# Patient Record
Sex: MALE | Race: WHITE | NOT HISPANIC OR LATINO | Employment: UNEMPLOYED | URBAN - METROPOLITAN AREA
[De-identification: names, ages, dates, MRNs, and addresses within clinical notes are randomized per-mention and may not be internally consistent; named-entity substitution may affect disease eponyms.]

---

## 2018-06-07 LAB
ABSOL LYMPHOCYTES (HISTORICAL): 1.4 K/UL (ref 0.5–4)
ALBUMIN SERPL BCP-MCNC: 3.8 G/DL (ref 3–5.2)
ALT SERPL W P-5'-P-CCNC: 25 U/L (ref 9–52)
AST SERPL W P-5'-P-CCNC: 12 U/L (ref 17–59)
BASOPHILS # BLD AUTO: 0 K/UL (ref 0–0.1)
BASOPHILS # BLD AUTO: 1 % (ref 0–1)
CREATINE, SERUM (HISTORICAL): 0.59 MG/DL (ref 0.7–1.5)
DEPRECATED RDW RBC AUTO: 12.8 %
EGFR (HISTORICAL): >60 ML/MIN/1.73 M2
EOSINOPHIL # BLD AUTO: 0.1 K/UL (ref 0–0.4)
EOSINOPHIL NFR BLD AUTO: 1 % (ref 0–6)
ERYTHROCYTE SEDIMENTATION RATE (HISTORICAL): 71 MM (ref 1–20)
HCT VFR BLD AUTO: 40.4 % (ref 41–53)
HGB BLD-MCNC: 13.5 G/DL (ref 13.5–17.5)
LYMPHOCYTES NFR BLD AUTO: 20 % (ref 25–45)
MCH RBC QN AUTO: 31.9 PG (ref 26–34)
MCHC RBC AUTO-ENTMCNC: 33.5 % (ref 31–36)
MCV RBC AUTO: 95 FL (ref 80–100)
MONOCYTES # BLD AUTO: 0.5 K/UL (ref 0.2–0.9)
MONOCYTES NFR BLD AUTO: 8 % (ref 1–10)
NEUTROPHILS ABS COUNT (HISTORICAL): 4.8 K/UL (ref 1.8–7.8)
NEUTS SEG NFR BLD AUTO: 70 % (ref 45–65)
PLATELET # BLD AUTO: 264 K/MCL (ref 150–450)
RBC # BLD AUTO: 4.25 M/MCL (ref 4.5–5.9)
WBC # BLD AUTO: 6.8 K/MCL (ref 4.5–11)

## 2018-06-29 ENCOUNTER — TRANSCRIBE ORDERS (OUTPATIENT)
Dept: ADMINISTRATIVE | Facility: HOSPITAL | Age: 43
End: 2018-06-29

## 2018-06-29 ENCOUNTER — APPOINTMENT (OUTPATIENT)
Dept: LAB | Facility: HOSPITAL | Age: 43
End: 2018-06-29
Payer: COMMERCIAL

## 2018-06-29 DIAGNOSIS — Z51.81 ENCOUNTER FOR THERAPEUTIC DRUG MONITORING: ICD-10-CM

## 2018-06-29 DIAGNOSIS — E11.65 UNCONTROLLED TYPE 2 DIABETES MELLITUS WITH COMPLICATION, UNSPECIFIED WHETHER LONG TERM INSULIN USE: ICD-10-CM

## 2018-06-29 DIAGNOSIS — Z51.81 ENCOUNTER FOR THERAPEUTIC DRUG MONITORING: Primary | ICD-10-CM

## 2018-06-29 DIAGNOSIS — E11.8 UNCONTROLLED TYPE 2 DIABETES MELLITUS WITH COMPLICATION, UNSPECIFIED WHETHER LONG TERM INSULIN USE: ICD-10-CM

## 2018-06-29 DIAGNOSIS — E61.1 IRON DEFICIENCY: ICD-10-CM

## 2018-06-29 DIAGNOSIS — E11.8 UNCONTROLLED TYPE 2 DIABETES MELLITUS WITH COMPLICATION, UNSPECIFIED WHETHER LONG TERM INSULIN USE: Primary | ICD-10-CM

## 2018-06-29 DIAGNOSIS — E11.65 UNCONTROLLED TYPE 2 DIABETES MELLITUS WITH COMPLICATION, UNSPECIFIED WHETHER LONG TERM INSULIN USE: Primary | ICD-10-CM

## 2018-06-29 LAB
ALBUMIN SERPL BCP-MCNC: 3.6 G/DL (ref 3–5.2)
ALP SERPL-CCNC: 88 U/L (ref 43–122)
ALT SERPL W P-5'-P-CCNC: 47 U/L (ref 9–52)
ANION GAP SERPL CALCULATED.3IONS-SCNC: 10 MMOL/L (ref 5–14)
AST SERPL W P-5'-P-CCNC: 15 U/L (ref 17–59)
BASOPHILS # BLD AUTO: 0 THOUSANDS/ΜL (ref 0–0.1)
BASOPHILS NFR BLD AUTO: 0 % (ref 0–1)
BILIRUB SERPL-MCNC: 0.5 MG/DL
BUN SERPL-MCNC: 22 MG/DL (ref 5–25)
CALCIUM SERPL-MCNC: 9.3 MG/DL (ref 8.4–10.2)
CHLORIDE SERPL-SCNC: 101 MMOL/L (ref 97–108)
CHOLEST SERPL-MCNC: 165 MG/DL
CO2 SERPL-SCNC: 26 MMOL/L (ref 22–30)
CREAT SERPL-MCNC: 0.49 MG/DL (ref 0.7–1.5)
CREAT UR-MCNC: 155 MG/DL
EOSINOPHIL # BLD AUTO: 0.1 THOUSAND/ΜL (ref 0–0.4)
EOSINOPHIL NFR BLD AUTO: 2 % (ref 0–6)
ERYTHROCYTE [DISTWIDTH] IN BLOOD BY AUTOMATED COUNT: 13.5 %
EST. AVERAGE GLUCOSE BLD GHB EST-MCNC: 194 MG/DL
GFR SERPL CREATININE-BSD FRML MDRD: 137 ML/MIN/1.73SQ M
GLUCOSE P FAST SERPL-MCNC: 169 MG/DL (ref 70–99)
HBA1C MFR BLD: 8.4 % (ref 4.2–6.3)
HCT VFR BLD AUTO: 40.7 % (ref 41–53)
HDLC SERPL-MCNC: 42 MG/DL (ref 40–59)
HGB BLD-MCNC: 13.4 G/DL (ref 13.5–17.5)
LDLC SERPL CALC-MCNC: 93 MG/DL
LYMPHOCYTES # BLD AUTO: 1 THOUSANDS/ΜL (ref 0.5–4)
LYMPHOCYTES NFR BLD AUTO: 12 % (ref 20–50)
MCH RBC QN AUTO: 31.5 PG (ref 26–34)
MCHC RBC AUTO-ENTMCNC: 33 G/DL (ref 31–36)
MCV RBC AUTO: 96 FL (ref 80–100)
MICROALBUMIN UR-MCNC: 27.1 MG/L (ref 0–20)
MICROALBUMIN/CREAT 24H UR: 17 MG/G CREATININE (ref 0–30)
MONOCYTES # BLD AUTO: 0.6 THOUSAND/ΜL (ref 0.2–0.9)
MONOCYTES NFR BLD AUTO: 7 % (ref 1–10)
NEUTROPHILS # BLD AUTO: 7 THOUSANDS/ΜL (ref 1.8–7.8)
NEUTS SEG NFR BLD AUTO: 79 % (ref 45–65)
NONHDLC SERPL-MCNC: 123 MG/DL
PLATELET # BLD AUTO: 283 THOUSANDS/UL (ref 150–450)
PMV BLD AUTO: 9.1 FL (ref 8.9–12.7)
POTASSIUM SERPL-SCNC: 4 MMOL/L (ref 3.6–5)
PROT SERPL-MCNC: 6.7 G/DL (ref 5.9–8.4)
RBC # BLD AUTO: 4.25 MILLION/UL (ref 4.5–5.9)
SODIUM SERPL-SCNC: 137 MMOL/L (ref 137–147)
T3FREE SERPL-MCNC: 2.86 PG/ML (ref 2.3–4.2)
T4 SERPL-MCNC: 8.6 UG/DL (ref 4.7–13.3)
TRIGL SERPL-MCNC: 148 MG/DL
TSH SERPL DL<=0.05 MIU/L-ACNC: 5.32 UIU/ML (ref 0.47–4.68)
WBC # BLD AUTO: 8.8 THOUSAND/UL (ref 4.5–11)

## 2018-06-29 PROCEDURE — 84443 ASSAY THYROID STIM HORMONE: CPT

## 2018-06-29 PROCEDURE — 80307 DRUG TEST PRSMV CHEM ANLYZR: CPT

## 2018-06-29 PROCEDURE — 82043 UR ALBUMIN QUANTITATIVE: CPT

## 2018-06-29 PROCEDURE — 80061 LIPID PANEL: CPT

## 2018-06-29 PROCEDURE — 36415 COLL VENOUS BLD VENIPUNCTURE: CPT

## 2018-06-29 PROCEDURE — 80053 COMPREHEN METABOLIC PANEL: CPT

## 2018-06-29 PROCEDURE — 84436 ASSAY OF TOTAL THYROXINE: CPT

## 2018-06-29 PROCEDURE — 82570 ASSAY OF URINE CREATININE: CPT

## 2018-06-29 PROCEDURE — 85025 COMPLETE CBC W/AUTO DIFF WBC: CPT

## 2018-06-29 PROCEDURE — 3061F NEG MICROALBUMINURIA REV: CPT | Performed by: FAMILY MEDICINE

## 2018-06-29 PROCEDURE — 84481 FREE ASSAY (FT-3): CPT

## 2018-06-29 PROCEDURE — 83036 HEMOGLOBIN GLYCOSYLATED A1C: CPT

## 2018-07-05 LAB
AMPHETAMINES UR QL SCN: NEGATIVE NG/ML
BARBITURATES UR QL SCN: NEGATIVE NG/ML
BENZODIAZ UR QL SCN: POSITIVE
BZE UR QL: NEGATIVE NG/ML
CANNABINOIDS UR QL SCN: ABNORMAL
METHADONE UR QL SCN: NEGATIVE NG/ML
OPIATES UR QL: NEGATIVE NG/ML
PCP UR QL: NEGATIVE NG/ML
PROPOXYPH UR QL: NEGATIVE NG/ML

## 2018-07-17 ENCOUNTER — TELEPHONE (OUTPATIENT)
Dept: FAMILY MEDICINE CLINIC | Facility: CLINIC | Age: 43
End: 2018-07-17

## 2018-07-17 DIAGNOSIS — E11.9 TYPE 2 DIABETES MELLITUS WITHOUT COMPLICATION, WITHOUT LONG-TERM CURRENT USE OF INSULIN (HCC): Primary | ICD-10-CM

## 2018-07-17 NOTE — TELEPHONE ENCOUNTER
Spoke with patient  TSH slightly elevated, FT4 normal   Would repeat in 3 mos  Hgb A1C 8 6  Needs third med  Will leave decision to new doctor  Has appt  In August   Januvia prescribed

## 2018-08-28 ENCOUNTER — APPOINTMENT (OUTPATIENT)
Dept: LAB | Facility: HOSPITAL | Age: 43
End: 2018-08-28
Payer: COMMERCIAL

## 2018-08-28 ENCOUNTER — TRANSCRIBE ORDERS (OUTPATIENT)
Dept: ADMINISTRATIVE | Facility: HOSPITAL | Age: 43
End: 2018-08-28

## 2018-08-28 DIAGNOSIS — M05.79 RHEUMATOID ARTHRITIS INVOLVING MULTIPLE SITES WITH POSITIVE RHEUMATOID FACTOR (HCC): ICD-10-CM

## 2018-08-28 DIAGNOSIS — M05.79 RHEUMATOID ARTHRITIS INVOLVING MULTIPLE SITES WITH POSITIVE RHEUMATOID FACTOR (HCC): Primary | ICD-10-CM

## 2018-08-28 LAB
ALBUMIN SERPL BCP-MCNC: 4.1 G/DL (ref 3–5.2)
ALT SERPL W P-5'-P-CCNC: 33 U/L (ref 9–52)
AST SERPL W P-5'-P-CCNC: 15 U/L (ref 17–59)
BASOPHILS # BLD AUTO: 0.1 THOUSANDS/ΜL (ref 0–0.1)
BASOPHILS NFR BLD AUTO: 1 % (ref 0–1)
CREAT SERPL-MCNC: 0.65 MG/DL (ref 0.7–1.5)
EOSINOPHIL # BLD AUTO: 0.1 THOUSAND/ΜL (ref 0–0.4)
EOSINOPHIL NFR BLD AUTO: 1 % (ref 0–6)
ERYTHROCYTE [DISTWIDTH] IN BLOOD BY AUTOMATED COUNT: 13.8 %
ERYTHROCYTE [SEDIMENTATION RATE] IN BLOOD: 61 MM/HOUR (ref 1–20)
GFR SERPL CREATININE-BSD FRML MDRD: 122 ML/MIN/1.73SQ M
HCT VFR BLD AUTO: 41.7 % (ref 41–53)
HGB BLD-MCNC: 13.7 G/DL (ref 13.5–17.5)
LYMPHOCYTES # BLD AUTO: 1.9 THOUSANDS/ΜL (ref 0.5–4)
LYMPHOCYTES NFR BLD AUTO: 21 % (ref 20–50)
MCH RBC QN AUTO: 31.7 PG (ref 26–34)
MCHC RBC AUTO-ENTMCNC: 32.8 G/DL (ref 31–36)
MCV RBC AUTO: 97 FL (ref 80–100)
MONOCYTES # BLD AUTO: 0.8 THOUSAND/ΜL (ref 0.2–0.9)
MONOCYTES NFR BLD AUTO: 9 % (ref 1–10)
NEUTROPHILS # BLD AUTO: 6.5 THOUSANDS/ΜL (ref 1.8–7.8)
NEUTS SEG NFR BLD AUTO: 69 % (ref 45–65)
PLATELET # BLD AUTO: 309 THOUSANDS/UL (ref 150–450)
PMV BLD AUTO: 9.5 FL (ref 8.9–12.7)
RBC # BLD AUTO: 4.31 MILLION/UL (ref 4.5–5.9)
WBC # BLD AUTO: 9.4 THOUSAND/UL (ref 4.5–11)

## 2018-08-28 PROCEDURE — 85652 RBC SED RATE AUTOMATED: CPT

## 2018-08-28 PROCEDURE — 84450 TRANSFERASE (AST) (SGOT): CPT

## 2018-08-28 PROCEDURE — 82565 ASSAY OF CREATININE: CPT

## 2018-08-28 PROCEDURE — 84460 ALANINE AMINO (ALT) (SGPT): CPT

## 2018-08-28 PROCEDURE — 36415 COLL VENOUS BLD VENIPUNCTURE: CPT

## 2018-08-28 PROCEDURE — 82040 ASSAY OF SERUM ALBUMIN: CPT

## 2018-08-28 PROCEDURE — 85025 COMPLETE CBC W/AUTO DIFF WBC: CPT

## 2018-09-11 ENCOUNTER — APPOINTMENT (OUTPATIENT)
Dept: LAB | Facility: HOSPITAL | Age: 43
End: 2018-09-11
Payer: COMMERCIAL

## 2018-09-11 ENCOUNTER — OFFICE VISIT (OUTPATIENT)
Dept: FAMILY MEDICINE CLINIC | Facility: CLINIC | Age: 43
End: 2018-09-11
Payer: COMMERCIAL

## 2018-09-11 VITALS
DIASTOLIC BLOOD PRESSURE: 84 MMHG | BODY MASS INDEX: 24.24 KG/M2 | HEIGHT: 72 IN | OXYGEN SATURATION: 97 % | HEART RATE: 101 BPM | WEIGHT: 179 LBS | SYSTOLIC BLOOD PRESSURE: 138 MMHG | TEMPERATURE: 96.9 F | RESPIRATION RATE: 16 BRPM

## 2018-09-11 DIAGNOSIS — E11.8 TYPE 2 DIABETES MELLITUS WITH COMPLICATION, WITHOUT LONG-TERM CURRENT USE OF INSULIN (HCC): ICD-10-CM

## 2018-09-11 DIAGNOSIS — E11.9 TYPE 2 DIABETES MELLITUS WITHOUT COMPLICATION, WITHOUT LONG-TERM CURRENT USE OF INSULIN (HCC): ICD-10-CM

## 2018-09-11 DIAGNOSIS — M06.9 RHEUMATOID ARTHRITIS, INVOLVING UNSPECIFIED SITE, UNSPECIFIED RHEUMATOID FACTOR PRESENCE: Primary | ICD-10-CM

## 2018-09-11 LAB
EST. AVERAGE GLUCOSE BLD GHB EST-MCNC: 174 MG/DL
HBA1C MFR BLD: 7.7 % (ref 4.2–6.3)

## 2018-09-11 PROCEDURE — 36415 COLL VENOUS BLD VENIPUNCTURE: CPT

## 2018-09-11 PROCEDURE — 83036 HEMOGLOBIN GLYCOSYLATED A1C: CPT

## 2018-09-11 PROCEDURE — 99214 OFFICE O/P EST MOD 30 MIN: CPT | Performed by: FAMILY MEDICINE

## 2018-09-11 PROCEDURE — 3008F BODY MASS INDEX DOCD: CPT | Performed by: FAMILY MEDICINE

## 2018-09-11 PROCEDURE — 3725F SCREEN DEPRESSION PERFORMED: CPT | Performed by: FAMILY MEDICINE

## 2018-09-11 PROCEDURE — 1036F TOBACCO NON-USER: CPT | Performed by: FAMILY MEDICINE

## 2018-09-11 RX ORDER — MELOXICAM 15 MG/1
15 TABLET ORAL DAILY
Qty: 30 TABLET | Refills: 2 | Status: SHIPPED | OUTPATIENT
Start: 2018-09-11 | End: 2019-02-25 | Stop reason: SDUPTHER

## 2018-09-11 RX ORDER — SENNOSIDES 8.6 MG
650 CAPSULE ORAL EVERY 8 HOURS PRN
Qty: 30 TABLET | Refills: 3 | Status: SHIPPED | OUTPATIENT
Start: 2018-09-11 | End: 2021-10-21

## 2018-09-11 RX ORDER — MELOXICAM 15 MG/1
TABLET ORAL
Refills: 0 | COMMUNITY
Start: 2018-09-01 | End: 2018-09-11 | Stop reason: SDUPTHER

## 2018-09-11 RX ORDER — ALPRAZOLAM 2 MG/1
TABLET ORAL
Refills: 0 | COMMUNITY
Start: 2018-09-09 | End: 2019-08-01 | Stop reason: SDUPTHER

## 2018-09-11 RX ORDER — LEFLUNOMIDE 20 MG/1
20 TABLET ORAL DAILY
Refills: 0 | COMMUNITY
Start: 2018-08-27

## 2018-09-11 NOTE — ASSESSMENT & PLAN NOTE
Recently diagnosed  Currently under the care of Rheumatology  Will continue to monitor  Meloxicam refilled for patient comfort

## 2018-09-11 NOTE — PROGRESS NOTES
Assessment/Plan:    Type 2 diabetes mellitus (HCC)  Lab Results   Component Value Date    HGBA1C 8 4 (H) 06/29/2018       No results for input(s): POCGLU in the last 72 hours  Blood Sugar Average: Last 72 hrs: Advised to take Metformin BID as directed  A1c ordered  Continue to strive for DM diet   Check feet daily for any breaks in the skin and follow up with PCP for any positive findings   Follow yearly with eye doctor for dilated eye exam   Januvia and Metformin refilled        Rheumatoid arthritis ([address] )  Recently diagnosed  Currently under the care of Rheumatology  Will continue to monitor  Meloxicam refilled for patient comfort  Diagnoses and all orders for this visit:    Rheumatoid arthritis, involving unspecified site, unspecified rheumatoid factor presence (HCC)  -     meloxicam (MOBIC) 15 mg tablet; Take 1 tablet (15 mg total) by mouth daily    Type 2 diabetes mellitus with complication, without long-term current use of insulin (HCC)  -     metFORMIN (GLUCOPHAGE) 1000 MG tablet; Take 1 tablet (1,000 mg total) by mouth 2 (two) times a day with meals  -     acetaminophen (TYLENOL) 650 mg CR tablet; Take 1 tablet (650 mg total) by mouth every 8 (eight) hours as needed for mild pain    Type 2 diabetes mellitus without complication, without long-term current use of insulin ([facility])  -     sitaGLIPtin (JANUVIA) 100 mg tablet; Take 1 tablet (100 mg total) by mouth daily  -     HEMOGLOBIN A1C W/ EAG ESTIMATION; Future    Other orders  -     Adalimumab 40 MG/0 8ML PNKT; Inject 40 mg under the skin  -     leflunomide (ARAVA) 20 MG tablet; Take 20 mg by mouth daily  -     ALPRAZolam (XANAX) 2 MG tablet;   -     Discontinue: meloxicam (MOBIC) 15 mg tablet;   -     Discontinue: metFORMIN (GLUCOPHAGE) 1000 MG tablet; 2 (two) times a day with meals  1 tab daily   -     sitaGLIPtin (JANUVIA) 100 mg tablet; every 24 hours        Subjective:      Patient ID: Stephanie Cochran is a 38 y o  male      HPI  38year old male presents to clinic with a [facility] Rheumatoid Arthirits, Diabetes and  Depression, presents to clinic for medication refills  Follows up with Rheumatologist frequently as he begun taking Humira, is scheduled to follow up in 3 weeks  Currently concerned about his A1c level which was 8 4 in June  Upon medication reconciliation it was noted he has only been taking metformin daily opposed to BID, was counseled on the importance of taking medications as prescribed  Has no other complaints at this time  The following portions of the patient's history were reviewed and updated as appropriate: allergies, current medications, past family history, past medical history, past social history, past surgical history and problem list     Review of Systems   Constitutional: Negative for activity change, chills and fever  HENT: Negative for congestion, facial swelling and sneezing  Eyes: Negative for pain  Respiratory: Negative for choking and chest tightness  Cardiovascular: Negative for chest pain and leg swelling  Gastrointestinal: Negative for abdominal distention and abdominal pain  Genitourinary: Negative for dysuria and hematuria  Musculoskeletal: Positive for arthralgias and joint swelling  Negative for back pain  Neurological: Negative for seizures and headaches  Psychiatric/Behavioral: Negative for agitation, hallucinations and self-injury  Objective:      /84 (BP Location: Right arm, Patient Position: Sitting, Cuff Size: Standard)   Pulse 101   Temp (!) 96 9 °F (36 1 °C) (Temporal)   Resp 16   Ht 6' (1 829 m)   Wt 81 2 kg (179 lb)   SpO2 97%   BMI 24 28 kg/m²          Physical Exam   Constitutional: He is oriented to person, place, and time  He appears well-developed and well-nourished  No distress  HENT:   Head: Atraumatic     Right Ear: External ear normal    Left Ear: External ear normal    Nose: Nose normal    Mouth/Throat: Oropharynx is clear and moist  No oropharyngeal exudate  Eyes: Pupils are equal, round, and reactive to light  No scleral icterus  Neck: Neck supple  No tracheal deviation present  No thyromegaly present  Cardiovascular: Normal rate, regular rhythm and intact distal pulses  No murmur heard  Pulmonary/Chest: Effort normal and breath sounds normal  No respiratory distress  He has no wheezes  Abdominal: Soft  Bowel sounds are normal  He exhibits no distension  There is no tenderness  Musculoskeletal: Normal range of motion  He exhibits no edema  Neurological: He is alert and oriented to person, place, and time  Skin: Skin is warm  He is not diaphoretic  Psychiatric: He has a normal mood and affect   His behavior is normal        A1C in the past 12 month Yes  Urine microalbumin in the past 12 month No  If HTN or urine microalbumin >30, ACEi or ARB No  Lipid profile in the past 12 month YES  On Statin No,   Lab Results   Component Value Date    CHOLESTEROL 165 06/29/2018    HDL 42 06/29/2018    TRIG 148 06/29/2018     ASA for >48years old with RF No  If BMI >25, documented nutrition, exercise, wt reduction N/A  PHQ-2 Yes  BP controlled (<140/90) Yes  Dilated eye exam in the past 12 month No  Foot exam in the past 12 month No  Flu vaccine in the past 12 month Yes  PCV No  Smoker No  Counseled for tobacco cessation No

## 2018-09-11 NOTE — ASSESSMENT & PLAN NOTE
Lab Results   Component Value Date    HGBA1C 8 4 (H) 06/29/2018       No results for input(s): POCGLU in the last 72 hours  Blood Sugar Average: Last 72 hrs: Advised to take Metformin BID as directed    A1c ordered  Continue to strive for DM diet   Check feet daily for any breaks in the skin and follow up with PCP for any positive findings   Follow yearly with eye doctor for dilated eye exam   Januvia and Metformin refilled

## 2018-11-21 ENCOUNTER — HOSPITAL ENCOUNTER (OUTPATIENT)
Dept: RADIOLOGY | Facility: HOSPITAL | Age: 43
Discharge: HOME | End: 2018-11-21
Payer: COMMERCIAL

## 2018-11-21 ENCOUNTER — APPOINTMENT (OUTPATIENT)
Dept: LAB | Facility: HOSPITAL | Age: 43
End: 2018-11-21
Payer: COMMERCIAL

## 2018-11-21 ENCOUNTER — OFFICE VISIT (OUTPATIENT)
Dept: FAMILY MEDICINE CLINIC | Facility: CLINIC | Age: 43
End: 2018-11-21
Payer: COMMERCIAL

## 2018-11-21 ENCOUNTER — TRANSCRIBE ORDERS (OUTPATIENT)
Dept: ADMINISTRATIVE | Facility: HOSPITAL | Age: 43
End: 2018-11-21

## 2018-11-21 VITALS
DIASTOLIC BLOOD PRESSURE: 90 MMHG | SYSTOLIC BLOOD PRESSURE: 140 MMHG | HEIGHT: 72 IN | BODY MASS INDEX: 25.33 KG/M2 | RESPIRATION RATE: 16 BRPM | HEART RATE: 115 BPM | OXYGEN SATURATION: 95 % | TEMPERATURE: 96.7 F | WEIGHT: 187 LBS

## 2018-11-21 DIAGNOSIS — M05.79 RHEUMATOID ARTHRITIS INVOLVING MULTIPLE SITES WITH POSITIVE RHEUMATOID FACTOR (HCC): Primary | ICD-10-CM

## 2018-11-21 DIAGNOSIS — M05.79 RHEUMATOID ARTHRITIS INVOLVING MULTIPLE SITES WITH POSITIVE RHEUMATOID FACTOR (HCC): ICD-10-CM

## 2018-11-21 DIAGNOSIS — M79.671 FOOT PAIN, BILATERAL: Primary | ICD-10-CM

## 2018-11-21 DIAGNOSIS — M79.672 FOOT PAIN, BILATERAL: Primary | ICD-10-CM

## 2018-11-21 LAB
ALBUMIN SERPL BCP-MCNC: 4.1 G/DL (ref 3–5.2)
ALT SERPL W P-5'-P-CCNC: 37 U/L (ref 9–52)
AST SERPL W P-5'-P-CCNC: 21 U/L (ref 17–59)
BASOPHILS # BLD AUTO: 0.1 THOUSANDS/ΜL (ref 0–0.1)
BASOPHILS NFR BLD AUTO: 1 % (ref 0–1)
CREAT SERPL-MCNC: 0.63 MG/DL (ref 0.7–1.5)
EOSINOPHIL # BLD AUTO: 0.1 THOUSAND/ΜL (ref 0–0.4)
EOSINOPHIL NFR BLD AUTO: 2 % (ref 0–6)
ERYTHROCYTE [DISTWIDTH] IN BLOOD BY AUTOMATED COUNT: 13.8 %
ERYTHROCYTE [SEDIMENTATION RATE] IN BLOOD: 28 MM/HOUR (ref 1–20)
GFR SERPL CREATININE-BSD FRML MDRD: 122 ML/MIN/1.73SQ M
HCT VFR BLD AUTO: 39.1 % (ref 41–53)
HGB BLD-MCNC: 12.7 G/DL (ref 13.5–17.5)
LYMPHOCYTES # BLD AUTO: 1.7 THOUSANDS/ΜL (ref 0.5–4)
LYMPHOCYTES NFR BLD AUTO: 25 % (ref 20–50)
MCH RBC QN AUTO: 32.7 PG (ref 26–34)
MCHC RBC AUTO-ENTMCNC: 32.6 G/DL (ref 31–36)
MCV RBC AUTO: 100 FL (ref 80–100)
MONOCYTES # BLD AUTO: 0.7 THOUSAND/ΜL (ref 0.2–0.9)
MONOCYTES NFR BLD AUTO: 11 % (ref 1–10)
NEUTROPHILS # BLD AUTO: 4 THOUSANDS/ΜL (ref 1.8–7.8)
NEUTS SEG NFR BLD AUTO: 61 % (ref 45–65)
PLATELET # BLD AUTO: 264 THOUSANDS/UL (ref 150–450)
PLATELET BLD QL SMEAR: ADEQUATE
PMV BLD AUTO: 9.9 FL (ref 8.9–12.7)
RBC # BLD AUTO: 3.9 MILLION/UL (ref 4.5–5.9)
RBC MORPH BLD: NORMAL
WBC # BLD AUTO: 6.6 THOUSAND/UL (ref 4.5–11)

## 2018-11-21 PROCEDURE — 84450 TRANSFERASE (AST) (SGOT): CPT

## 2018-11-21 PROCEDURE — 73130 X-RAY EXAM OF HAND: CPT

## 2018-11-21 PROCEDURE — 99214 OFFICE O/P EST MOD 30 MIN: CPT | Performed by: FAMILY MEDICINE

## 2018-11-21 PROCEDURE — 84460 ALANINE AMINO (ALT) (SGPT): CPT

## 2018-11-21 PROCEDURE — 85652 RBC SED RATE AUTOMATED: CPT

## 2018-11-21 PROCEDURE — 36415 COLL VENOUS BLD VENIPUNCTURE: CPT

## 2018-11-21 PROCEDURE — 3008F BODY MASS INDEX DOCD: CPT | Performed by: FAMILY MEDICINE

## 2018-11-21 PROCEDURE — 85025 COMPLETE CBC W/AUTO DIFF WBC: CPT

## 2018-11-21 PROCEDURE — 82565 ASSAY OF CREATININE: CPT

## 2018-11-21 PROCEDURE — 82040 ASSAY OF SERUM ALBUMIN: CPT

## 2018-11-21 RX ORDER — LEFLUNOMIDE 20 MG/1
20 TABLET ORAL
COMMUNITY
Start: 2018-10-18 | End: 2019-08-01 | Stop reason: SDUPTHER

## 2018-11-21 RX ORDER — BLOOD SUGAR DIAGNOSTIC
STRIP MISCELLANEOUS
COMMUNITY
Start: 2016-10-20 | End: 2021-09-08 | Stop reason: CLARIF

## 2018-11-21 RX ORDER — MELOXICAM 15 MG/1
TABLET ORAL
COMMUNITY
Start: 2018-06-07 | End: 2019-08-01 | Stop reason: SDUPTHER

## 2018-11-21 RX ORDER — LANCETS
EACH MISCELLANEOUS
COMMUNITY
Start: 2017-08-16 | End: 2021-09-08 | Stop reason: SDDI

## 2018-11-21 RX ORDER — ALPRAZOLAM 2 MG/1
TABLET ORAL 3 TIMES DAILY
COMMUNITY
End: 2022-05-04

## 2018-11-21 RX ORDER — QUETIAPINE FUMARATE 200 MG/1
TABLET, FILM COATED ORAL DAILY PRN
COMMUNITY

## 2018-11-21 NOTE — PROGRESS NOTES
Assessment/Plan:    Foot pain, bilateral  I explained to patient that his B/L feet pain and bony tumors on his feet are most likely secondary to RA   I advised him to wear comfortable well fitting shoes and take Tylenol for pain PRN  I do not think he is a candidate for surgery as these RA depositions will recover with therapy and he is encouraged to maintain follow-up with Rheumatologist       Diagnoses and all orders for this visit:    Foot pain, bilateral    Other orders  -     glucose blood test strip; test once daily before breakfast  -     ACCU-CHEK SOFTCLIX LANCETS lancets; TEST once daily BEFORE BREAKFAST  -     Alcohol Swabs (ALCOHOL PADS) 70 % PADS; use as directed once daily  -     QUEtiapine (SEROQUEL) 200 mg tablet; every 24 hours  -     leflunomide (ARAVA) 20 MG tablet; Take 20 mg by mouth  -     meloxicam (MOBIC) 15 mg tablet; TAKE ONE PO DAILY  -     ALPRAZolam (XANAX) 2 MG tablet; 3 (three) times a day          Subjective:      Patient ID: Kamaljit Marinelli is a 41 y o  male  41year old male presents to clinic with a PMH Rheumatoid Arthirits, Diabetes and  Depression  He presents today as sick visit  He reports guru bump to the lateral aspect of both of his feet, L > R  Reports intermittent episoes of pain that is tolerable and does not wake him up at night  He also reports some cough which he attributes to smoking marijuana  I advised him to abstain from drug abuse  The following portions of the patient's history were reviewed and updated as appropriate: allergies, current medications, past family history, past medical history, past social history, past surgical history and problem list     Review of Systems   Constitutional: Negative for appetite change, chills, fatigue and fever  HENT: Negative for congestion  Respiratory: Negative for cough and shortness of breath  Cardiovascular: Negative for chest pain, palpitations and leg swelling     Gastrointestinal: Negative for abdominal pain, constipation, diarrhea and nausea  Musculoskeletal: Negative for arthralgias  Skin: Negative for rash  Allergic/Immunologic: Negative for environmental allergies and food allergies  Neurological: Negative for syncope  Objective:      /90 (BP Location: Right arm, Patient Position: Sitting, Cuff Size: Standard)   Pulse (!) 115   Temp (!) 96 7 °F (35 9 °C) (Temporal)   Resp 16   Ht 6' (1 829 m)   Wt 84 8 kg (187 lb)   SpO2 95%   BMI 25 36 kg/m²          Physical Exam   Constitutional: He is oriented to person, place, and time  He appears well-developed and well-nourished  No distress  HENT:   Head: Normocephalic  Eyes: Pupils are equal, round, and reactive to light  Neck: Normal range of motion  Cardiovascular: Normal rate, regular rhythm and normal heart sounds  Pulmonary/Chest: Effort normal and breath sounds normal  No respiratory distress  He has no wheezes  Abdominal: Soft  Bowel sounds are normal  He exhibits no distension  There is no tenderness  Musculoskeletal: Normal range of motion  He exhibits no edema or tenderness  Feet:    Neurological: He is alert and oriented to person, place, and time  Skin: Skin is warm and dry

## 2018-11-21 NOTE — ASSESSMENT & PLAN NOTE
I explained to patient that his B/L feet pain and bony tumors on his feet are most likely secondary to RA   I advised him to wear comfortable well fitting shoes and take Tylenol for pain PRN  I do not think he is a candidate for surgery as these RA depositions will recover with therapy and he is encouraged to maintain follow-up with Rheumatologist

## 2018-12-10 DIAGNOSIS — E11.9 TYPE 2 DIABETES MELLITUS WITHOUT COMPLICATION, WITHOUT LONG-TERM CURRENT USE OF INSULIN (HCC): ICD-10-CM

## 2018-12-10 RX ORDER — SITAGLIPTIN 100 MG/1
TABLET, FILM COATED ORAL
Qty: 90 TABLET | Refills: 0 | Status: SHIPPED | OUTPATIENT
Start: 2018-12-10 | End: 2019-03-18 | Stop reason: SDUPTHER

## 2019-01-06 DIAGNOSIS — E11.8 TYPE 2 DIABETES MELLITUS WITH COMPLICATION, WITHOUT LONG-TERM CURRENT USE OF INSULIN (HCC): ICD-10-CM

## 2019-02-05 ENCOUNTER — OFFICE VISIT (OUTPATIENT)
Dept: FAMILY MEDICINE CLINIC | Facility: CLINIC | Age: 44
End: 2019-02-05

## 2019-02-05 ENCOUNTER — PATIENT OUTREACH (OUTPATIENT)
Dept: FAMILY MEDICINE CLINIC | Facility: CLINIC | Age: 44
End: 2019-02-05

## 2019-02-05 VITALS
SYSTOLIC BLOOD PRESSURE: 130 MMHG | DIASTOLIC BLOOD PRESSURE: 82 MMHG | HEIGHT: 72 IN | OXYGEN SATURATION: 97 % | WEIGHT: 185 LBS | HEART RATE: 103 BPM | TEMPERATURE: 96.6 F | RESPIRATION RATE: 18 BRPM | BODY MASS INDEX: 25.06 KG/M2

## 2019-02-05 DIAGNOSIS — K21.9 GASTROESOPHAGEAL REFLUX DISEASE, ESOPHAGITIS PRESENCE NOT SPECIFIED: ICD-10-CM

## 2019-02-05 DIAGNOSIS — Z23 ENCOUNTER FOR VACCINATION: Primary | ICD-10-CM

## 2019-02-05 DIAGNOSIS — E11.8 TYPE 2 DIABETES MELLITUS WITH COMPLICATION, WITHOUT LONG-TERM CURRENT USE OF INSULIN (HCC): ICD-10-CM

## 2019-02-05 DIAGNOSIS — M06.9 RHEUMATOID ARTHRITIS, INVOLVING UNSPECIFIED SITE, UNSPECIFIED RHEUMATOID FACTOR PRESENCE: ICD-10-CM

## 2019-02-05 PROCEDURE — G0009 ADMIN PNEUMOCOCCAL VACCINE: HCPCS | Performed by: INTERNAL MEDICINE

## 2019-02-05 PROCEDURE — 90670 PCV13 VACCINE IM: CPT | Performed by: INTERNAL MEDICINE

## 2019-02-05 PROCEDURE — 90686 IIV4 VACC NO PRSV 0.5 ML IM: CPT | Performed by: INTERNAL MEDICINE

## 2019-02-05 PROCEDURE — G0008 ADMIN INFLUENZA VIRUS VAC: HCPCS | Performed by: INTERNAL MEDICINE

## 2019-02-05 PROCEDURE — 99214 OFFICE O/P EST MOD 30 MIN: CPT | Performed by: INTERNAL MEDICINE

## 2019-02-05 RX ORDER — OMEPRAZOLE 20 MG/1
20 CAPSULE, DELAYED RELEASE ORAL DAILY
Qty: 30 CAPSULE | Refills: 1 | Status: SHIPPED | OUTPATIENT
Start: 2019-02-05 | End: 2021-09-08 | Stop reason: ALTCHOICE

## 2019-02-05 NOTE — PROGRESS NOTES
Assessment/Plan:    Type 2 diabetes mellitus (HCC)  Lab Results   Component Value Date    HGBA1C 7 7 (H) 09/11/2018       No results for input(s): POCGLU in the last 72 hours  Blood Sugar Average: Last 72 hrs:  A1cs appear to be improving  Will recheck level  Admits to being compliant with his metformin and januvia    Rheumatoid arthritis ([address] )  Currently being managed by Dr Aida Fields  Has follow up in the coming week  Will continue to monitor  Had a long detailed discussion with patient regarding disease process and common symptoms associated with RA      Encounter for vaccination  Influenza vaccination given today  PCV 13 given as he received 23 in 2014 and is at high risk with RA and on Humira  Quantiferon gold ordered         Diagnoses and all orders for this visit:    Encounter for vaccination  -     Cancel: PREFERRED: influenza vaccine, [id number], quadrivalent, recombinant, PF, 0 5 mL (FLUBLOK)  -     PNEUMOCOCCAL CONJUGATE VACCINE 13-VALENT GREATER THAN 6 MONTHS  -     SYRINGE/SINGLE-DOSE VIAL: influenza vaccine, [id number], quadrivalent, 0 5 mL, preservative-free (AFLURIA, FLUARIX, FLULAVAL, FLUZONE)    Rheumatoid arthritis, involving unspecified site, unspecified rheumatoid factor presence ([facility] )  -     HEMOGLOBIN A1C W/ EAG ESTIMATION; Future  -     Quantiferon TB ([facility]); Future  -     Ambulatory referral to social work care management program; Future    Type 2 diabetes mellitus with complication, without long-term current use of insulin (HCC)  -     HEMOGLOBIN A1C W/ EAG ESTIMATION; Future    Gastroesophageal reflux disease, esophagitis presence not specified  -     omeprazole (PriLOSEC) 20 mg delayed release capsule; Take 1 capsule (20 mg total) by mouth daily        Subjective:      Patient ID: Adeline Jara  is a 41 y o  male  HPI  Presents for routine follow up, states he has been following up with Dr Yocasta Casarez for his RA and has a follow up appointment next week    Currently complaining of emesis, states he has been finding himself throwing up more often after eating pasta based foods and is uncertain as to why this is  Denies any sick contacts, recent weight loss, diarhea, constipation, fevers, sick contacts  Additionally complaining of joint pain, specifically on his hands and feet, states this is not new and has been a chronic problem for him  Has no other complaints at this time, has been very active in the gym and has been watching his diet  Is receiving Humira BIW, states his girlfriend assists him with the injections  The following portions of the patient's history were reviewed and updated as appropriate: allergies, current medications, past family history, past medical history, past social history, past surgical history and problem list     Review of Systems   Constitutional: Positive for appetite change  Negative for activity change, chills, diaphoresis, fatigue and fever  HENT: Negative for congestion, ear pain, hearing loss, postnasal drip, rhinorrhea, sneezing, sore throat and tinnitus  Eyes: Negative for pain  Respiratory: Negative for apnea, cough, choking, chest tightness, shortness of breath and wheezing  Cardiovascular: Negative for chest pain, palpitations and leg swelling  Gastrointestinal: Positive for nausea and vomiting  Negative for abdominal distention, abdominal pain, constipation and diarrhea  Genitourinary: Negative for decreased urine volume and dysuria  Musculoskeletal: Positive for arthralgias and joint swelling  Negative for back pain  Skin: Negative for rash  Neurological: Negative for dizziness, tremors and syncope  Psychiatric/Behavioral: Negative for agitation and suicidal ideas           Objective:      /82 (BP Location: Right arm, Patient Position: Sitting, Cuff Size: Standard)   Pulse 103   Temp (!) 96 6 °F (35 9 °C) (Temporal)   Resp 18   Ht 6' (1 829 m)   Wt 83 9 kg (185 lb)   SpO2 97%   BMI 25 09 kg/m²          Physical Exam   Constitutional: He is oriented to person, place, and time  No distress  HENT:   Head: Normocephalic and atraumatic  Mouth/Throat: No oropharyngeal exudate  Eyes: Conjunctivae are normal  No scleral icterus  Neck: Normal range of motion  Neck supple  No tracheal deviation present  No thyromegaly present  Cardiovascular: Normal rate, regular rhythm and normal heart sounds  No murmur heard  Pulmonary/Chest: Effort normal and breath sounds normal  No respiratory distress  He has no wheezes  Abdominal: Soft  Bowel sounds are normal  He exhibits no distension  There is no tenderness  There is no rebound  Musculoskeletal: He exhibits edema  Mild joint effusion noted R>L ankle  L ankle Swelling > R ankle  No abnormal gait noted  Neurological: He is alert and oriented to person, place, and time  Skin: Skin is warm  He is not diaphoretic  Psychiatric: He has a normal mood and affect

## 2019-02-06 NOTE — ASSESSMENT & PLAN NOTE
Influenza vaccination given today  PCV 13 given as he received 23 in 2014 and is at high risk with RA and on Humira    Quantiferon gold ordered

## 2019-02-06 NOTE — ASSESSMENT & PLAN NOTE
Currently being managed by Dr Isma Dixon  Has follow up in the coming week  Will continue to monitor  Had a long detailed discussion with patient regarding disease process and common symptoms associated with RA

## 2019-02-08 NOTE — PROGRESS NOTES
Dr Bro Damian and LSW spoke about pt's transportation  Dr Bro Damian states that pt expresses having some difficulty getting to his appointments due to a transportation issue  LSW explained about the [facility] transport option for family practice clinic appts  However, Dr Bro Damian states that pt is set up with [facility]  LSW explained there is no other transportation that pt could be set up for at this time  LSW is available for additional support as needed via order

## 2019-02-22 ENCOUNTER — APPOINTMENT (OUTPATIENT)
Dept: LAB | Facility: HOSPITAL | Age: 44
End: 2019-02-22
Payer: COMMERCIAL

## 2019-02-22 ENCOUNTER — TRANSCRIBE ORDERS (OUTPATIENT)
Dept: ADMINISTRATIVE | Facility: HOSPITAL | Age: 44
End: 2019-02-22

## 2019-02-22 DIAGNOSIS — E11.8 TYPE 2 DIABETES MELLITUS WITH COMPLICATION, WITHOUT LONG-TERM CURRENT USE OF INSULIN (HCC): ICD-10-CM

## 2019-02-22 DIAGNOSIS — Z51.81 ENCOUNTER FOR THERAPEUTIC DRUG MONITORING: Primary | ICD-10-CM

## 2019-02-22 DIAGNOSIS — M06.9 RHEUMATOID ARTHRITIS, INVOLVING UNSPECIFIED SITE, UNSPECIFIED RHEUMATOID FACTOR PRESENCE: ICD-10-CM

## 2019-02-22 DIAGNOSIS — Z51.81 ENCOUNTER FOR THERAPEUTIC DRUG MONITORING: ICD-10-CM

## 2019-02-22 DIAGNOSIS — Z79.899 ENCOUNTER FOR LONG-TERM (CURRENT) USE OF MEDICATIONS: ICD-10-CM

## 2019-02-22 LAB
ALBUMIN SERPL BCP-MCNC: 4.8 G/DL (ref 3–5.2)
ALP SERPL-CCNC: 98 U/L (ref 43–122)
ALT SERPL W P-5'-P-CCNC: 37 U/L (ref 9–52)
ANION GAP SERPL CALCULATED.3IONS-SCNC: 11 MMOL/L (ref 5–14)
AST SERPL W P-5'-P-CCNC: 41 U/L (ref 17–59)
BASOPHILS # BLD AUTO: 0 THOUSANDS/ΜL (ref 0–0.1)
BASOPHILS NFR BLD AUTO: 1 % (ref 0–1)
BILIRUB DIRECT SERPL-MCNC: 0.2 MG/DL
BILIRUB SERPL-MCNC: 0.5 MG/DL
BUN SERPL-MCNC: 8 MG/DL (ref 5–25)
CALCIUM SERPL-MCNC: 10 MG/DL (ref 8.4–10.2)
CHLORIDE SERPL-SCNC: 100 MMOL/L (ref 97–108)
CO2 SERPL-SCNC: 28 MMOL/L (ref 22–30)
CREAT SERPL-MCNC: 0.55 MG/DL (ref 0.7–1.5)
EOSINOPHIL # BLD AUTO: 0 THOUSAND/ΜL (ref 0–0.4)
EOSINOPHIL NFR BLD AUTO: 1 % (ref 0–6)
ERYTHROCYTE [DISTWIDTH] IN BLOOD BY AUTOMATED COUNT: 12.9 %
ERYTHROCYTE [SEDIMENTATION RATE] IN BLOOD: 28 MM/HOUR (ref 1–20)
EST. AVERAGE GLUCOSE BLD GHB EST-MCNC: 146 MG/DL
GFR SERPL CREATININE-BSD FRML MDRD: 129 ML/MIN/1.73SQ M
GLUCOSE SERPL-MCNC: 160 MG/DL (ref 70–99)
HBA1C MFR BLD: 6.7 % (ref 4.2–6.3)
HCT VFR BLD AUTO: 46.4 % (ref 41–53)
HGB BLD-MCNC: 15.4 G/DL (ref 13.5–17.5)
LDH SERPL-CCNC: 497 U/L (ref 313–618)
LYMPHOCYTES # BLD AUTO: 1.4 THOUSANDS/ΜL (ref 0.5–4)
LYMPHOCYTES NFR BLD AUTO: 22 % (ref 25–45)
MAGNESIUM SERPL-MCNC: 1.9 MG/DL (ref 1.6–2.3)
MCH RBC QN AUTO: 32.9 PG (ref 26–34)
MCHC RBC AUTO-ENTMCNC: 33.2 G/DL (ref 31–36)
MCV RBC AUTO: 99 FL (ref 80–100)
MONOCYTES # BLD AUTO: 0.6 THOUSAND/ΜL (ref 0.2–0.9)
MONOCYTES NFR BLD AUTO: 9 % (ref 1–10)
NEUTROPHILS # BLD AUTO: 4.6 THOUSANDS/ΜL (ref 1.8–7.8)
NEUTS SEG NFR BLD AUTO: 69 % (ref 45–65)
PHOSPHATE SERPL-MCNC: 3.3 MG/DL (ref 2.5–4.8)
PLATELET # BLD AUTO: 265 THOUSANDS/UL (ref 150–450)
PMV BLD AUTO: 9.5 FL (ref 8.9–12.7)
POTASSIUM SERPL-SCNC: 3.7 MMOL/L (ref 3.6–5)
PROT SERPL-MCNC: 8.2 G/DL (ref 5.9–8.4)
RBC # BLD AUTO: 4.69 MILLION/UL (ref 4.5–5.9)
SODIUM SERPL-SCNC: 139 MMOL/L (ref 137–147)
TRIGL SERPL-MCNC: 181 MG/DL
WBC # BLD AUTO: 6.7 THOUSAND/UL (ref 4.5–11)

## 2019-02-22 PROCEDURE — 85652 RBC SED RATE AUTOMATED: CPT

## 2019-02-22 PROCEDURE — 86480 TB TEST CELL IMMUN MEASURE: CPT

## 2019-02-22 PROCEDURE — 84478 ASSAY OF TRIGLYCERIDES: CPT

## 2019-02-22 PROCEDURE — 83036 HEMOGLOBIN GLYCOSYLATED A1C: CPT

## 2019-02-22 PROCEDURE — 84100 ASSAY OF PHOSPHORUS: CPT

## 2019-02-22 PROCEDURE — 85025 COMPLETE CBC W/AUTO DIFF WBC: CPT

## 2019-02-22 PROCEDURE — 80076 HEPATIC FUNCTION PANEL: CPT

## 2019-02-22 PROCEDURE — 80048 BASIC METABOLIC PNL TOTAL CA: CPT

## 2019-02-22 PROCEDURE — 36415 COLL VENOUS BLD VENIPUNCTURE: CPT

## 2019-02-22 PROCEDURE — 83735 ASSAY OF MAGNESIUM: CPT

## 2019-02-22 PROCEDURE — 83615 LACTATE (LD) (LDH) ENZYME: CPT

## 2019-02-25 DIAGNOSIS — M06.9 RHEUMATOID ARTHRITIS, INVOLVING UNSPECIFIED SITE, UNSPECIFIED RHEUMATOID FACTOR PRESENCE: ICD-10-CM

## 2019-02-25 LAB
GAMMA INTERFERON BACKGROUND BLD IA-ACNC: 0.03 IU/ML
M TB IFN-G BLD-IMP: NEGATIVE
M TB IFN-G CD4+ BCKGRND COR BLD-ACNC: -0.01 IU/ML
M TB IFN-G CD4+ BCKGRND COR BLD-ACNC: -0.02 IU/ML
MITOGEN IGNF BCKGRD COR BLD-ACNC: >10 IU/ML

## 2019-02-25 RX ORDER — MELOXICAM 15 MG/1
TABLET ORAL
Qty: 30 TABLET | Refills: 2 | Status: SHIPPED | OUTPATIENT
Start: 2019-02-25

## 2019-03-18 DIAGNOSIS — E11.9 TYPE 2 DIABETES MELLITUS WITHOUT COMPLICATION, WITHOUT LONG-TERM CURRENT USE OF INSULIN (HCC): ICD-10-CM

## 2019-03-18 RX ORDER — SITAGLIPTIN 100 MG/1
TABLET, FILM COATED ORAL
Qty: 90 TABLET | Refills: 0 | Status: SHIPPED | OUTPATIENT
Start: 2019-03-18 | End: 2019-07-31 | Stop reason: SDUPTHER

## 2019-04-11 ENCOUNTER — OFFICE VISIT (OUTPATIENT)
Dept: FAMILY MEDICINE CLINIC | Facility: CLINIC | Age: 44
End: 2019-04-11

## 2019-04-11 VITALS
SYSTOLIC BLOOD PRESSURE: 110 MMHG | BODY MASS INDEX: 25.33 KG/M2 | HEART RATE: 88 BPM | HEIGHT: 72 IN | OXYGEN SATURATION: 96 % | RESPIRATION RATE: 20 BRPM | WEIGHT: 187 LBS | DIASTOLIC BLOOD PRESSURE: 70 MMHG | TEMPERATURE: 97 F

## 2019-04-11 DIAGNOSIS — Z00.00 WELCOME TO MEDICARE PREVENTIVE VISIT: Primary | ICD-10-CM

## 2019-04-11 DIAGNOSIS — E11.8 TYPE 2 DIABETES MELLITUS WITH COMPLICATION, WITHOUT LONG-TERM CURRENT USE OF INSULIN (HCC): ICD-10-CM

## 2019-04-11 DIAGNOSIS — E66.3 OVERWEIGHT (BMI 25.0-29.9): ICD-10-CM

## 2019-04-11 PROCEDURE — G0403 EKG FOR INITIAL PREVENT EXAM: HCPCS | Performed by: PHYSICIAN ASSISTANT

## 2019-04-11 PROCEDURE — G0402 INITIAL PREVENTIVE EXAM: HCPCS | Performed by: PHYSICIAN ASSISTANT

## 2019-04-12 PROBLEM — E66.3 OVERWEIGHT (BMI 25.0-29.9): Status: ACTIVE | Noted: 2019-04-12

## 2019-05-14 ENCOUNTER — OFFICE VISIT (OUTPATIENT)
Dept: FAMILY MEDICINE CLINIC | Facility: CLINIC | Age: 44
End: 2019-05-14

## 2019-05-14 VITALS
HEART RATE: 87 BPM | OXYGEN SATURATION: 98 % | TEMPERATURE: 97.9 F | SYSTOLIC BLOOD PRESSURE: 122 MMHG | HEIGHT: 72 IN | BODY MASS INDEX: 25.47 KG/M2 | WEIGHT: 188 LBS | RESPIRATION RATE: 18 BRPM | DIASTOLIC BLOOD PRESSURE: 74 MMHG

## 2019-05-14 DIAGNOSIS — R05.9 COUGH: Primary | ICD-10-CM

## 2019-05-14 PROCEDURE — 99213 OFFICE O/P EST LOW 20 MIN: CPT | Performed by: INTERNAL MEDICINE

## 2019-05-14 RX ORDER — MONTELUKAST SODIUM 10 MG/1
10 TABLET ORAL
Qty: 30 TABLET | Refills: 1 | Status: SHIPPED | OUTPATIENT
Start: 2019-05-14 | End: 2019-11-06 | Stop reason: SDUPTHER

## 2019-05-14 RX ORDER — FLUTICASONE PROPIONATE 50 MCG
1 SPRAY, SUSPENSION (ML) NASAL DAILY
Qty: 1 BOTTLE | Refills: 2 | Status: SHIPPED | OUTPATIENT
Start: 2019-05-14 | End: 2021-09-08 | Stop reason: ALTCHOICE

## 2019-05-14 RX ORDER — FLUTICASONE PROPIONATE 50 MCG
1 SPRAY, SUSPENSION (ML) NASAL DAILY
Qty: 1 BOTTLE | Refills: 2 | Status: SHIPPED | OUTPATIENT
Start: 2019-05-14 | End: 2019-05-14

## 2019-05-16 ENCOUNTER — APPOINTMENT (OUTPATIENT)
Dept: LAB | Facility: HOSPITAL | Age: 44
End: 2019-05-16
Payer: COMMERCIAL

## 2019-05-16 ENCOUNTER — TRANSCRIBE ORDERS (OUTPATIENT)
Dept: ADMINISTRATIVE | Facility: HOSPITAL | Age: 44
End: 2019-05-16

## 2019-05-16 ENCOUNTER — HOSPITAL ENCOUNTER (OUTPATIENT)
Dept: RADIOLOGY | Facility: HOSPITAL | Age: 44
Discharge: HOME | End: 2019-05-16
Payer: COMMERCIAL

## 2019-05-16 DIAGNOSIS — Z79.899 ENCOUNTER FOR MONITORING LEFLUNOMIDE THERAPY: ICD-10-CM

## 2019-05-16 DIAGNOSIS — Z51.81 ENCOUNTER FOR MONITORING SULFASALAZINE THERAPY: ICD-10-CM

## 2019-05-16 DIAGNOSIS — R05.9 COUGH: ICD-10-CM

## 2019-05-16 DIAGNOSIS — Z79.899 ENCOUNTER FOR MONITORING SULFASALAZINE THERAPY: ICD-10-CM

## 2019-05-16 DIAGNOSIS — Z51.81 ENCOUNTER FOR MONITORING LEFLUNOMIDE THERAPY: ICD-10-CM

## 2019-05-16 DIAGNOSIS — Z51.81 ENCOUNTER FOR MONITORING LEFLUNOMIDE THERAPY: Primary | ICD-10-CM

## 2019-05-16 DIAGNOSIS — Z79.899 ENCOUNTER FOR MONITORING LEFLUNOMIDE THERAPY: Primary | ICD-10-CM

## 2019-05-16 LAB
ALBUMIN SERPL BCP-MCNC: 4.4 G/DL (ref 3–5.2)
ALT SERPL W P-5'-P-CCNC: 29 U/L (ref 9–52)
AST SERPL W P-5'-P-CCNC: 28 U/L (ref 17–59)
BASOPHILS # BLD AUTO: 0 THOUSANDS/ΜL (ref 0–0.1)
BASOPHILS NFR BLD AUTO: 0 % (ref 0–1)
CREAT SERPL-MCNC: 0.6 MG/DL (ref 0.7–1.5)
EOSINOPHIL # BLD AUTO: 0.1 THOUSAND/ΜL (ref 0–0.4)
EOSINOPHIL NFR BLD AUTO: 1 % (ref 0–6)
ERYTHROCYTE [DISTWIDTH] IN BLOOD BY AUTOMATED COUNT: 13.4 %
ERYTHROCYTE [SEDIMENTATION RATE] IN BLOOD: 25 MM/HOUR (ref 1–20)
GFR SERPL CREATININE-BSD FRML MDRD: 125 ML/MIN/1.73SQ M
HCT VFR BLD AUTO: 43.7 % (ref 41–53)
HGB BLD-MCNC: 14.4 G/DL (ref 13.5–17.5)
LYMPHOCYTES # BLD AUTO: 1.6 THOUSANDS/ΜL (ref 0.5–4)
LYMPHOCYTES NFR BLD AUTO: 17 % (ref 25–45)
MCH RBC QN AUTO: 32.3 PG (ref 26–34)
MCHC RBC AUTO-ENTMCNC: 32.9 G/DL (ref 31–36)
MCV RBC AUTO: 98 FL (ref 80–100)
MONOCYTES # BLD AUTO: 0.7 THOUSAND/ΜL (ref 0.2–0.9)
MONOCYTES NFR BLD AUTO: 8 % (ref 1–10)
NEUTROPHILS # BLD AUTO: 6.8 THOUSANDS/ΜL (ref 1.8–7.8)
NEUTS SEG NFR BLD AUTO: 74 % (ref 45–65)
PLATELET # BLD AUTO: 282 THOUSANDS/UL (ref 150–450)
PMV BLD AUTO: 9.7 FL (ref 8.9–12.7)
RBC # BLD AUTO: 4.44 MILLION/UL (ref 4.5–5.9)
WBC # BLD AUTO: 9.2 THOUSAND/UL (ref 4.5–11)

## 2019-05-16 PROCEDURE — 84450 TRANSFERASE (AST) (SGOT): CPT

## 2019-05-16 PROCEDURE — 84460 ALANINE AMINO (ALT) (SGPT): CPT

## 2019-05-16 PROCEDURE — 85652 RBC SED RATE AUTOMATED: CPT

## 2019-05-16 PROCEDURE — 36415 COLL VENOUS BLD VENIPUNCTURE: CPT

## 2019-05-16 PROCEDURE — 71046 X-RAY EXAM CHEST 2 VIEWS: CPT

## 2019-05-16 PROCEDURE — 82040 ASSAY OF SERUM ALBUMIN: CPT

## 2019-05-16 PROCEDURE — 82565 ASSAY OF CREATININE: CPT

## 2019-05-16 PROCEDURE — 85025 COMPLETE CBC W/AUTO DIFF WBC: CPT

## 2019-05-22 ENCOUNTER — TELEPHONE (OUTPATIENT)
Dept: FAMILY MEDICINE CLINIC | Facility: CLINIC | Age: 44
End: 2019-05-22

## 2019-06-06 DIAGNOSIS — Z98.890 HISTORY OF ROOT CANAL PROCEDURE: Primary | ICD-10-CM

## 2019-06-06 RX ORDER — AMOXICILLIN 500 MG/1
500 CAPSULE ORAL EVERY 8 HOURS SCHEDULED
Qty: 21 CAPSULE | Refills: 0 | Status: SHIPPED | OUTPATIENT
Start: 2019-06-06 | End: 2019-06-13

## 2019-06-27 ENCOUNTER — TELEPHONE (OUTPATIENT)
Dept: FAMILY MEDICINE CLINIC | Facility: CLINIC | Age: 44
End: 2019-06-27

## 2019-07-31 DIAGNOSIS — E11.9 TYPE 2 DIABETES MELLITUS WITHOUT COMPLICATION, WITHOUT LONG-TERM CURRENT USE OF INSULIN (HCC): ICD-10-CM

## 2019-08-05 DIAGNOSIS — E11.8 TYPE 2 DIABETES MELLITUS WITH COMPLICATION, WITHOUT LONG-TERM CURRENT USE OF INSULIN (HCC): ICD-10-CM

## 2019-08-05 NOTE — TELEPHONE ENCOUNTER
Pt last seen 4/11/19, next OV 8/21/19  Pt has no showed and/or cancelled several appts since his last OV on 4/11/19  Pt has been urged to keep this important and explained the importance of following up  Pt understood and agreed  I asked pt about the iron pill he was requesting  He advised me this was something he was taking quite some time ago that was prescribed by his previous PCP  I advised pt he will have to discuss this request at his next OV on 8/21/19  Pt agreed

## 2019-08-21 ENCOUNTER — OFFICE VISIT (OUTPATIENT)
Dept: FAMILY MEDICINE CLINIC | Facility: CLINIC | Age: 44
End: 2019-08-21

## 2019-08-21 VITALS
BODY MASS INDEX: 25.9 KG/M2 | SYSTOLIC BLOOD PRESSURE: 120 MMHG | WEIGHT: 191 LBS | RESPIRATION RATE: 16 BRPM | DIASTOLIC BLOOD PRESSURE: 72 MMHG | TEMPERATURE: 96 F | HEART RATE: 113 BPM | OXYGEN SATURATION: 97 %

## 2019-08-21 DIAGNOSIS — M06.9 RHEUMATOID ARTHRITIS, INVOLVING UNSPECIFIED SITE, UNSPECIFIED RHEUMATOID FACTOR PRESENCE: ICD-10-CM

## 2019-08-21 DIAGNOSIS — E11.8 TYPE 2 DIABETES MELLITUS WITH COMPLICATION, WITHOUT LONG-TERM CURRENT USE OF INSULIN (HCC): Primary | ICD-10-CM

## 2019-08-21 LAB — SL AMB POCT HEMOGLOBIN AIC: 9.4 (ref ?–6.5)

## 2019-08-21 PROCEDURE — 3046F HEMOGLOBIN A1C LEVEL >9.0%: CPT | Performed by: FAMILY MEDICINE

## 2019-08-21 PROCEDURE — 83036 HEMOGLOBIN GLYCOSYLATED A1C: CPT | Performed by: FAMILY MEDICINE

## 2019-08-21 PROCEDURE — 99213 OFFICE O/P EST LOW 20 MIN: CPT | Performed by: FAMILY MEDICINE

## 2019-08-21 RX ORDER — HYDROXYZINE HYDROCHLORIDE 25 MG/1
TABLET, FILM COATED ORAL
COMMUNITY
Start: 2019-07-25 | End: 2022-04-04 | Stop reason: SDDI

## 2019-08-21 NOTE — ASSESSMENT & PLAN NOTE
Lab Results   Component Value Date    HGBA1C 9 4 (A) 08/21/2019       No results for input(s): POCGLU in the last 72 hours  Blood Sugar Average: Last 72 hrs:   poorly controlled  Likely secondary to noncompliance with medication  Reason for stay importance of routine diet and exercise in addition to taking medication as prescribed, verbalized understanding    Diabetic foot exam performed by me in office, unremarkable   - return to clinic in 3 months will repeat A1c and adjust as needed

## 2019-08-21 NOTE — PROGRESS NOTES
Assessment/Plan:    Type 2 diabetes mellitus ([place] [id number] )  Lab Results   Component Value Date    HGBA1C 9 4 (A) 08/21/2019       No results for input(s): POCGLU in the last 72 hours  Blood Sugar Average: Last 72 hrs:   poorly controlled  Likely secondary to noncompliance with medication  Reason for stay importance of routine diet and exercise in addition to taking medication as prescribed, verbalized understanding  Diabetic foot exam performed by me in office, unremarkable   - return to clinic in 3 months will repeat A1c and adjust as needed    Rheumatoid arthritis ([place] [id number] )  Follows-up with rheumatology routinely, currently on biologic weekly  Diagnoses and all orders for this visit:    Type 2 diabetes mellitus with complication, without long-term current use of insulin (HCC)  -     POCT hemoglobin A1c    Rheumatoid arthritis, involving unspecified site, unspecified rheumatoid factor presence (HCC)    Other orders  -     hydrOXYzine HCL (ATARAX) 25 mg tablet          Subjective:      Patient ID: Stormy Guerrero  is a 41 y o  male  HPI  68-year-old male with previous medical history of rheumatoid arthritis, DM type 2 presents to clinic for follow-up diabetes  States he has been without Januvia for the past 2 months as he has been having issues with pharmacy  Has no complaints at this time, follows up with Rheumatology routinely has a follow-up scheduled in the coming weeks  Additionally patient is a need a root canal has completed a course of antibiotic therapy and is scheduled to undergo procedure beginning of September  The following portions of the patient's history were reviewed and updated as appropriate: allergies, current medications, past family history, past medical history, past social history, past surgical history and problem list     Review of Systems   Constitutional: Negative for activity change, appetite change, chills, diaphoresis, fatigue and fever     HENT: Negative for congestion, ear pain, hearing loss, postnasal drip, rhinorrhea, sneezing, sore throat and tinnitus  Eyes: Negative for pain  Respiratory: Negative for apnea, cough, choking, chest tightness, shortness of breath and wheezing  Cardiovascular: Negative for chest pain, palpitations and leg swelling  Gastrointestinal: Negative for abdominal distention, abdominal pain, constipation, diarrhea, nausea and vomiting  Genitourinary: Negative for decreased urine volume and dysuria  Musculoskeletal: Negative for back pain  Skin: Negative for rash  Neurological: Negative for dizziness, tremors and syncope  Psychiatric/Behavioral: Negative for agitation and suicidal ideas  Objective:      /72 (BP Location: Right arm, Patient Position: Sitting, Cuff Size: Standard)   Pulse (!) 113   Temp (!) 96 °F (35 6 °C) (Temporal)   Resp 16   Wt 86 6 kg (191 lb)   SpO2 97%   BMI 25 90 kg/m²          Physical Exam   Constitutional: He is oriented to person, place, and time  He appears well-developed and well-nourished  No distress  HENT:   Head: Normocephalic and atraumatic  Right Ear: External ear normal    Left Ear: External ear normal    Mouth/Throat: Oropharynx is clear and moist  No oropharyngeal exudate  Eyes: Pupils are equal, round, and reactive to light  Conjunctivae are normal  No scleral icterus  Neck: Normal range of motion  Neck supple  No tracheal deviation present  No thyromegaly present  Cardiovascular: Normal rate, regular rhythm, normal heart sounds and intact distal pulses  No murmur heard  Pulmonary/Chest: Effort normal and breath sounds normal  No respiratory distress  He has no wheezes  Abdominal: Soft  Bowel sounds are normal  He exhibits no distension  There is no rebound  Musculoskeletal: Normal range of motion  He exhibits no edema  Rheumatoid nodules noted on left hand and right ankle    Pulses intact  Swelling is much improved from prior examination   Neurological: He is alert and oriented to person, place, and time  Skin: Skin is warm  He is not diaphoretic  Psychiatric: He has a normal mood and affect

## 2019-08-29 ENCOUNTER — OFFICE VISIT (OUTPATIENT)
Dept: FAMILY MEDICINE CLINIC | Facility: CLINIC | Age: 44
End: 2019-08-29

## 2019-08-29 DIAGNOSIS — L03.116 CELLULITIS OF LEFT LOWER EXTREMITY: Primary | ICD-10-CM

## 2019-08-29 LAB
LEFT EYE DIABETIC RETINOPATHY: NORMAL
RIGHT EYE DIABETIC RETINOPATHY: NORMAL

## 2019-08-29 PROCEDURE — 2023F DILAT RTA XM W/O RTNOPTHY: CPT | Performed by: INTERNAL MEDICINE

## 2019-08-29 PROCEDURE — 99214 OFFICE O/P EST MOD 30 MIN: CPT | Performed by: PHYSICIAN ASSISTANT

## 2019-08-29 RX ORDER — CEPHALEXIN 500 MG/1
500 CAPSULE ORAL EVERY 8 HOURS SCHEDULED
Qty: 21 CAPSULE | Refills: 0 | Status: SHIPPED | OUTPATIENT
Start: 2019-08-29 | End: 2019-09-05

## 2019-08-29 RX ORDER — HYDROXYZINE 50 MG/1
TABLET, FILM COATED ORAL
COMMUNITY
Start: 2019-08-21 | End: 2022-04-04 | Stop reason: SDDI

## 2019-08-29 RX ORDER — QUETIAPINE FUMARATE 300 MG/1
TABLET, FILM COATED ORAL DAILY PRN
COMMUNITY
Start: 2019-08-21

## 2019-08-29 NOTE — PATIENT INSTRUCTIONS
Cellulitis   WHAT YOU NEED TO KNOW:   Cellulitis is a skin infection caused by bacteria  Cellulitis may go away on its own or you may need treatment  Your healthcare provider may draw a [place] around the outside edges of your cellulitis  If your cellulitis spreads, your healthcare provider will see it outside of the [place]  DISCHARGE INSTRUCTIONS:   Call 911 if:   · You have sudden trouble breathing or chest pain  Return to the emergency department if:   · Your wound gets larger and more painful  · You feel a crackling under your skin when you touch it  · You have purple dots or bumps on your skin, or you see bleeding under your skin  · You have new swelling and pain in your legs  · The red, warm, swollen area gets larger  · You see red streaks coming from the infected area  Contact your healthcare provider if:   · You have a fever  · Your fever or pain does not go away or gets worse  · The area does not get smaller after 2 days of antibiotics  · Your skin is flaking or peeling off  · You have questions or concerns about your condition or care  Medicines:   · Antibiotics  help treat the bacterial infection  · NSAIDs , such as ibuprofen, help decrease swelling, pain, and fever  NSAIDs can cause stomach bleeding or kidney problems in certain people  If you take blood thinner medicine, always ask if NSAIDs are safe for you  Always read the medicine label and follow directions  Do not give these medicines to children under 10months of age without direction from your child's healthcare provider  · Acetaminophen  decreases pain and fever  It is available without a doctor's order  Ask how much to take and how often to take it  Follow directions  Read the labels of all other medicines you are using to see if they also contain acetaminophen, or ask your doctor or pharmacist  Acetaminophen can cause liver damage if not taken correctly   Do not use more than 4 grams (4,000 milligrams) total of acetaminophen in one day  · Take your medicine as directed  Contact your healthcare provider if you think your medicine is not helping or if you have side effects  Tell him or her if you are allergic to any medicine  Keep a list of the medicines, vitamins, and herbs you take  Include the amounts, and when and why you take them  Bring the list or the pill bottles to follow-up visits  Carry your medicine list with you in case of an emergency  Self-care:   · Elevate the area above the level of your heart  as often as you can  This will help decrease swelling and pain  Prop the area on pillows or blankets to keep it elevated comfortably  · Clean the area daily until the wound scabs over  Gently wash the area with soap and water  Pat dry  Use dressings as directed  · Place cool or warm, wet cloths on the area as directed  Use clean cloths and clean water  Leave it on the area until the cloth is room temperature  Pat the area dry with a clean, dry cloth  The cloths may help decrease pain  Prevent cellulitis:   · Do not scratch bug bites or areas of injury  You increase your risk for cellulitis by scratching these areas  · Do not share personal items, such as towels, clothing, and razors  · Clean exercise equipment  with germ-killing  before and after you use it  · Wash your hands often  Use soap and water  Wash your hands after you use the bathroom, change a child's diapers, or sneeze  Wash your hands before you prepare or eat food  Use lotion to prevent dry, cracked skin  · Wear pressure stockings as directed  You may be told to wear the stockings if you have peripheral edema  The stockings improve blood flow and decrease swelling  · Treat athlete's foot  This can help prevent the spread of a bacterial skin infection  Follow up with your healthcare provider within 3 days, or as directed:   Your healthcare provider will check if your cellulitis is getting better  You may need different medicine  Write down your questions so you remember to ask them during your visits  © 2017 [address] [employer] Information is for End User's use only and may not be sold, redistributed or otherwise used for commercial purposes  All illustrations and images included in CareNotes® are the copyrighted property of [employer]  or [employer]  The above information is an  only  It is not intended as medical advice for individual conditions or treatments  Talk to your doctor, nurse or pharmacist before following any medical regimen to see if it is safe and effective for you

## 2019-08-29 NOTE — PROGRESS NOTES
Assessment/Plan:    No problem-specific Assessment & Plan notes found for this encounter  Problem List Items Addressed This Visit     None      Visit Diagnoses     Cellulitis of left lower extremity    -  Primary    Relevant Medications    cephalexin (KEFLEX) 500 mg capsule        to start Keflex  Recommend monitoring area and if it is getting worse to contact the office  Subjective:      Patient ID: Everton Foster  is a 41 y o  male  HPI 40 y/o M with cut on left tibia  He denies any constantly bumped his leg in the area  He noticed small skin opening were cut there a few weeks ago  For last few days it started to get red around the area and swollen  He has also noticed that is more tender  No fevers or chills  He is diabetic and also is on DMARDs for rheumatoid arthritis  The following portions of the patient's history were reviewed and updated as appropriate:   He  has no past medical history on file  He   Patient Active Problem List    Diagnosis Date Noted    Cough 05/14/2019    Overweight (BMI 25 0-29 9) 04/12/2019    Encounter for vaccination 02/05/2019    GERD (gastroesophageal reflux disease) 02/05/2019    Foot pain, bilateral 11/21/2018    Type 2 diabetes mellitus ([facility] [address] ) 09/11/2018    Rheumatoid arthritis ([facility] [address] ) 02/28/2017     He  has no past surgical history on file  His family history is not on file  He  reports that he quit smoking about 6 years ago  He has never used smokeless tobacco  He reports that he drinks alcohol  He reports that he has current or past drug history  Drug: Marijuana    Current Outpatient Medications   Medication Sig Dispense Refill    acetaminophen (TYLENOL) 650 mg CR tablet Take 1 tablet (650 mg total) by mouth every 8 (eight) hours as needed for mild pain 30 tablet 3    Adalimumab 40 MG/0 8ML PNKT Inject 40 mg under the skin      Alcohol Swabs (ALCOHOL PADS) 70 % PADS use as directed once daily      glucose blood test strip test once daily before breakfast      hydrOXYzine HCL (ATARAX) 50 mg tablet       leflunomide (ARAVA) 20 MG tablet Take 20 mg by mouth daily  0    meloxicam (MOBIC) 15 mg tablet take 1 tablet by mouth once daily 30 tablet 2    metFORMIN (GLUCOPHAGE) 1000 MG tablet Take 1 tablet (1,000 mg total) by mouth 2 (two) times a day with meals 60 tablet 3    QUEtiapine (SEROQUEL) 200 mg tablet daily as needed       QUEtiapine (SEROquel) 300 mg tablet Take by mouth daily as needed      sitaGLIPtin (JANUVIA) 100 mg tablet Take 1 tablet (100 mg total) by mouth daily 90 tablet 0    ACCU-CHEK SOFTCLIX LANCETS lancets TEST once daily BEFORE BREAKFAST      ALPRAZolam (XANAX) 2 MG tablet 3 (three) times a day      cephalexin (KEFLEX) 500 mg capsule Take 1 capsule (500 mg total) by mouth every 8 (eight) hours for 7 days 21 capsule 0    fluticasone (FLONASE) 50 mcg/act nasal spray 1 spray into each nostril daily (Patient not taking: Reported on 8/21/2019) 1 Bottle 2    hydrOXYzine HCL (ATARAX) 25 mg tablet       montelukast (SINGULAIR) 10 mg tablet Take 1 tablet (10 mg total) by mouth daily at bedtime (Patient not taking: Reported on 8/21/2019) 30 tablet 1    omeprazole (PriLOSEC) 20 mg delayed release capsule Take 1 capsule (20 mg total) by mouth daily (Patient not taking: Reported on 8/21/2019) 30 capsule 1     No current facility-administered medications for this visit        Current Outpatient Medications on File Prior to Visit   Medication Sig    acetaminophen (TYLENOL) 650 mg CR tablet Take 1 tablet (650 mg total) by mouth every 8 (eight) hours as needed for mild pain    Adalimumab 40 MG/0 8ML PNKT Inject 40 mg under the skin    Alcohol Swabs (ALCOHOL PADS) 70 % PADS use as directed once daily    glucose blood test strip test once daily before breakfast    hydrOXYzine HCL (ATARAX) 50 mg tablet     leflunomide (ARAVA) 20 MG tablet Take 20 mg by mouth daily    meloxicam (MOBIC) 15 mg tablet take 1 tablet by mouth once daily    metFORMIN (GLUCOPHAGE) 1000 MG tablet Take 1 tablet (1,000 mg total) by mouth 2 (two) times a day with meals    QUEtiapine (SEROQUEL) 200 mg tablet daily as needed     QUEtiapine (SEROquel) 300 mg tablet Take by mouth daily as needed    sitaGLIPtin (JANUVIA) 100 mg tablet Take 1 tablet (100 mg total) by mouth daily    ACCU-CHEK SOFTCLIX LANCETS lancets TEST once daily BEFORE BREAKFAST    ALPRAZolam (XANAX) 2 MG tablet 3 (three) times a day    fluticasone (FLONASE) 50 mcg/act nasal spray 1 spray into each nostril daily (Patient not taking: Reported on 8/21/2019)    hydrOXYzine HCL (ATARAX) 25 mg tablet     montelukast (SINGULAIR) 10 mg tablet Take 1 tablet (10 mg total) by mouth daily at bedtime (Patient not taking: Reported on 8/21/2019)    omeprazole (PriLOSEC) 20 mg delayed release capsule Take 1 capsule (20 mg total) by mouth daily (Patient not taking: Reported on 8/21/2019)     No current facility-administered medications on file prior to visit  He has No Known Allergies       Review of Systems   Constitutional: Negative for chills and fever  Eyes: Positive for visual disturbance  Respiratory: Negative for shortness of breath  Cardiovascular: Negative for chest pain and leg swelling  Musculoskeletal: Positive for arthralgias  Skin: Positive for rash  Objective:      /88 (BP Location: Right arm, Patient Position: Sitting, Cuff Size: Adult)   Pulse 93   Temp (!) 97 2 °F (36 2 °C) (Tympanic)   Resp 16   Ht 6' (1 829 m)   Wt 86 6 kg (191 lb)   SpO2 96%   BMI 25 90 kg/m²          Physical Exam   Constitutional: He is oriented to person, place, and time  He appears well-developed and well-nourished  Neurological: He is alert and oriented to person, place, and time  Skin:   Left tibia with small scab at wound center and 1cm of surrounding erythema with tenderness, warmth and mild edema   Psychiatric: He has a normal mood and affect   His behavior is normal    Nursing note and vitals reviewed

## 2019-08-30 VITALS
DIASTOLIC BLOOD PRESSURE: 88 MMHG | HEART RATE: 93 BPM | RESPIRATION RATE: 16 BRPM | HEIGHT: 72 IN | BODY MASS INDEX: 25.87 KG/M2 | SYSTOLIC BLOOD PRESSURE: 138 MMHG | WEIGHT: 191 LBS | OXYGEN SATURATION: 96 % | TEMPERATURE: 97.2 F

## 2019-10-21 ENCOUNTER — APPOINTMENT (OUTPATIENT)
Dept: LAB | Facility: HOSPITAL | Age: 44
End: 2019-10-21
Payer: COMMERCIAL

## 2019-10-21 ENCOUNTER — TRANSCRIBE ORDERS (OUTPATIENT)
Dept: ADMINISTRATIVE | Facility: HOSPITAL | Age: 44
End: 2019-10-21

## 2019-10-21 DIAGNOSIS — Z51.81 ENCOUNTER FOR MONITORING LEFLUNOMIDE THERAPY: Primary | ICD-10-CM

## 2019-10-21 DIAGNOSIS — Z79.899 ENCOUNTER FOR MONITORING SULFASALAZINE THERAPY: ICD-10-CM

## 2019-10-21 DIAGNOSIS — Z79.899 ENCOUNTER FOR MONITORING LEFLUNOMIDE THERAPY: ICD-10-CM

## 2019-10-21 DIAGNOSIS — Z51.81 ENCOUNTER FOR MONITORING SULFASALAZINE THERAPY: ICD-10-CM

## 2019-10-21 DIAGNOSIS — Z51.81 ENCOUNTER FOR MONITORING LEFLUNOMIDE THERAPY: ICD-10-CM

## 2019-10-21 DIAGNOSIS — Z79.899 ENCOUNTER FOR MONITORING LEFLUNOMIDE THERAPY: Primary | ICD-10-CM

## 2019-10-21 LAB
ALBUMIN SERPL BCP-MCNC: 4.4 G/DL (ref 3–5.2)
ALT SERPL W P-5'-P-CCNC: 47 U/L (ref 9–52)
AST SERPL W P-5'-P-CCNC: 21 U/L (ref 17–59)
BASOPHILS # BLD AUTO: 0.1 THOUSANDS/ΜL (ref 0–0.1)
BASOPHILS NFR BLD AUTO: 1 % (ref 0–1)
CREAT SERPL-MCNC: 0.59 MG/DL (ref 0.7–1.5)
EOSINOPHIL # BLD AUTO: 0.1 THOUSAND/ΜL (ref 0–0.4)
EOSINOPHIL NFR BLD AUTO: 2 % (ref 0–6)
ERYTHROCYTE [DISTWIDTH] IN BLOOD BY AUTOMATED COUNT: 12.8 %
ERYTHROCYTE [SEDIMENTATION RATE] IN BLOOD: 26 MM/HOUR (ref 1–20)
GFR SERPL CREATININE-BSD FRML MDRD: 126 ML/MIN/1.73SQ M
HCT VFR BLD AUTO: 44 % (ref 41–53)
HGB BLD-MCNC: 14.8 G/DL (ref 13.5–17.5)
LYMPHOCYTES # BLD AUTO: 2.2 THOUSANDS/ΜL (ref 0.5–4)
LYMPHOCYTES NFR BLD AUTO: 37 % (ref 25–45)
MCH RBC QN AUTO: 32.6 PG (ref 26–34)
MCHC RBC AUTO-ENTMCNC: 33.6 G/DL (ref 31–36)
MCV RBC AUTO: 97 FL (ref 80–100)
MONOCYTES # BLD AUTO: 0.5 THOUSAND/ΜL (ref 0.2–0.9)
MONOCYTES NFR BLD AUTO: 9 % (ref 1–10)
NEUTROPHILS # BLD AUTO: 3 THOUSANDS/ΜL (ref 1.8–7.8)
NEUTS SEG NFR BLD AUTO: 51 % (ref 45–65)
PLATELET # BLD AUTO: 247 THOUSANDS/UL (ref 150–450)
PMV BLD AUTO: 9.9 FL (ref 8.9–12.7)
RBC # BLD AUTO: 4.53 MILLION/UL (ref 4.5–5.9)
WBC # BLD AUTO: 5.8 THOUSAND/UL (ref 4.5–11)

## 2019-10-21 PROCEDURE — 84450 TRANSFERASE (AST) (SGOT): CPT

## 2019-10-21 PROCEDURE — 82565 ASSAY OF CREATININE: CPT

## 2019-10-21 PROCEDURE — 85652 RBC SED RATE AUTOMATED: CPT

## 2019-10-21 PROCEDURE — 36415 COLL VENOUS BLD VENIPUNCTURE: CPT

## 2019-10-21 PROCEDURE — 85025 COMPLETE CBC W/AUTO DIFF WBC: CPT

## 2019-10-21 PROCEDURE — 82040 ASSAY OF SERUM ALBUMIN: CPT

## 2019-10-21 PROCEDURE — 84460 ALANINE AMINO (ALT) (SGPT): CPT

## 2019-11-06 ENCOUNTER — OFFICE VISIT (OUTPATIENT)
Dept: FAMILY MEDICINE CLINIC | Facility: CLINIC | Age: 44
End: 2019-11-06

## 2019-11-06 VITALS
DIASTOLIC BLOOD PRESSURE: 90 MMHG | HEART RATE: 107 BPM | WEIGHT: 190 LBS | HEIGHT: 72 IN | TEMPERATURE: 98 F | BODY MASS INDEX: 25.73 KG/M2 | SYSTOLIC BLOOD PRESSURE: 140 MMHG | RESPIRATION RATE: 18 BRPM | OXYGEN SATURATION: 97 %

## 2019-11-06 DIAGNOSIS — E11.9 TYPE 2 DIABETES MELLITUS WITHOUT COMPLICATION, WITHOUT LONG-TERM CURRENT USE OF INSULIN (HCC): ICD-10-CM

## 2019-11-06 DIAGNOSIS — R05.9 COUGH: ICD-10-CM

## 2019-11-06 DIAGNOSIS — Z23 NEED FOR INFLUENZA VACCINATION: Primary | ICD-10-CM

## 2019-11-06 DIAGNOSIS — Z00.00 ROUTINE MEDICAL EXAM: ICD-10-CM

## 2019-11-06 DIAGNOSIS — I87.2 VENOUS STASIS DERMATITIS OF LEFT LOWER EXTREMITY: ICD-10-CM

## 2019-11-06 PROCEDURE — G0438 PPPS, INITIAL VISIT: HCPCS | Performed by: FAMILY MEDICINE

## 2019-11-06 PROCEDURE — 3725F SCREEN DEPRESSION PERFORMED: CPT | Performed by: FAMILY MEDICINE

## 2019-11-06 PROCEDURE — 1036F TOBACCO NON-USER: CPT | Performed by: FAMILY MEDICINE

## 2019-11-06 PROCEDURE — 3008F BODY MASS INDEX DOCD: CPT | Performed by: FAMILY MEDICINE

## 2019-11-06 RX ORDER — ADALIMUMAB 40MG/0.4ML
KIT SUBCUTANEOUS
COMMUNITY
Start: 2019-10-08 | End: 2022-04-04 | Stop reason: ALTCHOICE

## 2019-11-06 RX ORDER — IBUPROFEN 600 MG/1
TABLET ORAL
COMMUNITY
Start: 2019-09-19 | End: 2021-10-21

## 2019-11-06 RX ORDER — MONTELUKAST SODIUM 10 MG/1
10 TABLET ORAL
Qty: 30 TABLET | Refills: 1 | Status: SHIPPED | OUTPATIENT
Start: 2019-11-06 | End: 2021-09-08 | Stop reason: ALTCHOICE

## 2019-11-06 RX ORDER — AMOXICILLIN 500 MG/1
CAPSULE ORAL
COMMUNITY
Start: 2019-09-19 | End: 2021-09-08 | Stop reason: ALTCHOICE

## 2019-11-06 NOTE — PROGRESS NOTES
Assessment and Plan:     Problem List Items Addressed This Visit        Endocrine    Type 2 diabetes mellitus ([address] )    Relevant Medications    sitaGLIPtin (JANUVIA) 100 mg tablet    Other Relevant Orders    HEMOGLOBIN A1C W/ EAG ESTIMATION       Musculoskeletal and Integument    Venous stasis dermatitis of left lower extremity     Reviewed physiology of process with patient  Low suspicion for DVT  I do not feel this is cellulitis  Encouraged elevation and warm compresses, will assess progress in the coming weeks, if no improvement will consider referral for podiatry            Other    Cough    Relevant Medications    montelukast (SINGULAIR) 10 mg tablet    Routine medical exam    Relevant Orders    Lipid panel    HEMOGLOBIN A1C W/ EAG ESTIMATION    Need for influenza vaccination - Primary           Preventive health issues were discussed with patient, and age appropriate screening tests were ordered as noted in patient's After Visit Summary  Personalized health advice and appropriate referrals for health education or preventive services given if needed, as noted in patient's After Visit Summary  History of Present Illness:     Patient presents for Welcome to Medicare visit  Patient Care Team:  Andre Goddard MD as PCP - General (Family Medicine)     Review of Systems:     Review of Systems   Constitutional: Negative for activity change, appetite change, chills, diaphoresis, fatigue and fever  HENT: Negative for congestion, ear pain, hearing loss, postnasal drip, rhinorrhea, sneezing, sore throat and tinnitus  Eyes: Negative for pain  Respiratory: Negative for apnea, cough, choking, chest tightness, shortness of breath and wheezing  Cardiovascular: Negative for chest pain, palpitations and leg swelling  Gastrointestinal: Negative for abdominal distention, abdominal pain, constipation, diarrhea, nausea and vomiting  Genitourinary: Negative for decreased urine volume and dysuria  Musculoskeletal: Negative for back pain  Skin: Negative for rash  Neurological: Negative for dizziness, tremors and syncope  Psychiatric/Behavioral: Negative for agitation and suicidal ideas  Problem List:     Patient Active Problem List   Diagnosis    Rheumatoid arthritis ([facility] )    Type 2 diabetes mellitus ([facility] )    Foot pain, bilateral    Encounter for vaccination    GERD (gastroesophageal reflux disease)    Overweight (BMI 25 0-29  9)    Cough    Routine medical exam    Need for influenza vaccination    Venous stasis dermatitis of left lower extremity      Past Medical and Surgical History:     History reviewed  No pertinent past medical history  History reviewed  No pertinent surgical history  Family History:     History reviewed  No pertinent family history     Social History:     Social History     Socioeconomic History    Marital status: Single     Spouse name: None    Number of children: None    Years of education: None    Highest education level: None   Occupational History    None   Social Needs    Financial resource strain: None    Food insecurity:     Worry: None     Inability: None    Transportation needs:     Medical: None     Non-medical: None   Tobacco Use    Smoking status: Former Smoker     Last attempt to quit: 2013     Years since quittin 1    Smokeless tobacco: Never Used   Substance and Sexual Activity    Alcohol use: Yes     Frequency: 2-4 times a month     Drinks per session: 3 or 4    Drug use: Yes     Types: Marijuana    Sexual activity: None   Lifestyle    Physical activity:     Days per week: None     Minutes per session: None    Stress: None   Relationships    Social connections:     Talks on phone: None     Gets together: None     Attends Christian service: None     Active member of club or organization: None     Attends meetings of clubs or organizations: None     Relationship status: None    Intimate partner violence:     Fear of current or ex partner: None     Emotionally abused: None     Physically abused: None     Forced sexual activity: None   Other Topics Concern    None   Social History Narrative    None      Medications and Allergies:     Current Outpatient Medications   Medication Sig Dispense Refill    ACCU-CHEK SOFTCLIX LANCETS lancets TEST once daily BEFORE BREAKFAST      acetaminophen (TYLENOL) 650 mg CR tablet Take 1 tablet (650 mg total) by mouth every 8 (eight) hours as needed for mild pain 30 tablet 3    Adalimumab 40 MG/0 8ML PNKT Inject 40 mg under the skin      Alcohol Swabs (ALCOHOL PADS) 70 % PADS use as directed once daily      ALPRAZolam (XANAX) 2 MG tablet 3 (three) times a day      amoxicillin (AMOXIL) 500 mg capsule       fluticasone (FLONASE) 50 mcg/act nasal spray 1 spray into each nostril daily (Patient not taking: Reported on 8/21/2019) 1 Bottle 2    glucose blood test strip test once daily before breakfast      HUMIRA PEN 40 MG/0 4ML PNKT       hydrOXYzine HCL (ATARAX) 25 mg tablet       hydrOXYzine HCL (ATARAX) 50 mg tablet       ibuprofen (MOTRIN) 600 mg tablet       leflunomide (ARAVA) 20 MG tablet Take 20 mg by mouth daily  0    meloxicam (MOBIC) 15 mg tablet take 1 tablet by mouth once daily 30 tablet 2    metFORMIN (GLUCOPHAGE) 1000 MG tablet Take 1 tablet (1,000 mg total) by mouth 2 (two) times a day with meals 60 tablet 3    montelukast (SINGULAIR) 10 mg tablet Take 1 tablet (10 mg total) by mouth daily at bedtime 30 tablet 1    omeprazole (PriLOSEC) 20 mg delayed release capsule Take 1 capsule (20 mg total) by mouth daily (Patient not taking: Reported on 8/21/2019) 30 capsule 1    QUEtiapine (SEROQUEL) 200 mg tablet daily as needed       QUEtiapine (SEROquel) 300 mg tablet Take by mouth daily as needed      sitaGLIPtin (JANUVIA) 100 mg tablet Take 1 tablet (100 mg total) by mouth daily 90 tablet 0     No current facility-administered medications for this visit        No Known Allergies Immunizations:     Immunization History   Administered Date(s) Administered    INFLUENZA 10/29/2013, 10/27/2014, 10/27/2014, 10/20/2016, 10/20/2016, 10/18/2017, 10/29/2019    Influenza Split 10/29/2013    Influenza, injectable, quadrivalent, preservative free 0 5 mL 02/05/2019    Pneumococcal Conjugate 13-Valent 02/05/2019    Pneumococcal Polysaccharide PPV23 05/20/2014    Tdap 03/28/2014      Health Maintenance: There are no preventive care reminders to display for this patient  Topic Date Due    HEPATITIS B VACCINES (1 of 3 - Risk 3-dose series) 11/20/1996      Medicare Screening Tests and Risk Assessments:         Health Risk Assessment:   Patient rates overall health as good  Patient feels that their physical health rating is slightly better  Eyesight was rated as same  Hearing was rated as same  Patient feels that their emotional and mental health rating is same  Pain experienced in the last 7 days has been none  Patient states that he has experienced no weight loss or gain in last 6 months  Depression Screening:   PHQ-2 Score: 0      Fall Risk Screening: In the past year, patient has experienced: no history of falling in past year      Home Safety:  Patient does not have trouble with stairs inside or outside of their home  Patient has working smoke alarms and has working carbon monoxide detector  Home safety hazards include: none  Nutrition:   Current diet is Diabetic  Medications:   Patient is not currently taking any over-the-counter supplements  Patient is able to manage medications  Activities of Daily Living (ADLs)/Instrumental Activities of Daily Living (IADLs):   Walk and transfer into and out of bed and chair?: No  Dress and groom yourself?: No    Bathe or shower yourself?: No    Feed yourself?  No  Do your laundry/housekeeping?: No  Manage your money, pay your bills and track your expenses?: No  Make your own meals?: No    Do your own shopping?: No    Previous Hospitalizations:   Any hospitalizations or ED visits within the last 12 months?: No      PREVENTIVE SCREENINGS      Cardiovascular Screening:    General: Screening Current      Diabetes Screening:     General: Screening Not Indicated and History Diabetes      Prostate Cancer Screening:    General: Screening Not Indicated      Abdominal Aortic Aneurysm (AAA) Screening:    Risk factors include: tobacco use      No exam data present     Physical Exam:     /90 (BP Location: Right arm, Patient Position: Sitting, Cuff Size: Standard)   Pulse (!) 107   Temp 98 °F (36 7 °C) (Temporal)   Resp 18   Ht 6' (1 829 m)   Wt 86 2 kg (190 lb)   SpO2 97%   BMI 25 77 kg/m²     Physical Exam   Constitutional: He is oriented to person, place, and time  He appears well-developed and well-nourished  No distress  HENT:   Head: Normocephalic and atraumatic  Right Ear: External ear normal    Left Ear: External ear normal    Mouth/Throat: Oropharynx is clear and moist  No oropharyngeal exudate  Eyes: Pupils are equal, round, and reactive to light  Conjunctivae are normal  No scleral icterus  Neck: Normal range of motion  Neck supple  No tracheal deviation present  No thyromegaly present  Cardiovascular: Normal rate, regular rhythm, normal heart sounds and intact distal pulses  No murmur heard  Pulmonary/Chest: Effort normal and breath sounds normal  No respiratory distress  He has no wheezes  Abdominal: Soft  Bowel sounds are normal  He exhibits no distension  There is no rebound  Musculoskeletal: Normal range of motion  He exhibits no edema  Rheumatoid nodules noted of bilateral wrist and ankle, Achilles tendon appears to have nodularities  Left lower extremity noted to have discoloration, no warmth, drainage, pain to light palpation  Pulses intact  No calf tenderness   Neurological: He is alert and oriented to person, place, and time  Skin: Skin is warm  He is not diaphoretic     Psychiatric: He has a normal mood and affect  Vitals reviewed        Cy Palmer MD

## 2019-11-06 NOTE — PATIENT INSTRUCTIONS

## 2019-11-06 NOTE — ASSESSMENT & PLAN NOTE
Reviewed physiology of process with patient  Low suspicion for DVT  I do not feel this is cellulitis    Encouraged elevation and warm compresses, will assess progress in the coming weeks, if no improvement will consider referral for podiatry

## 2019-12-27 ENCOUNTER — TRANSCRIBE ORDERS (OUTPATIENT)
Dept: ADMINISTRATIVE | Facility: HOSPITAL | Age: 44
End: 2019-12-27

## 2019-12-27 ENCOUNTER — APPOINTMENT (OUTPATIENT)
Dept: LAB | Facility: HOSPITAL | Age: 44
End: 2019-12-27
Payer: COMMERCIAL

## 2019-12-27 DIAGNOSIS — Z79.899 ENCOUNTER FOR MONITORING LEFLUNOMIDE THERAPY: Primary | ICD-10-CM

## 2019-12-27 DIAGNOSIS — Z79.899 ENCOUNTER FOR MONITORING SULFASALAZINE THERAPY: ICD-10-CM

## 2019-12-27 DIAGNOSIS — Z51.81 ENCOUNTER FOR MONITORING LEFLUNOMIDE THERAPY: ICD-10-CM

## 2019-12-27 DIAGNOSIS — Z51.81 ENCOUNTER FOR MONITORING SULFASALAZINE THERAPY: ICD-10-CM

## 2019-12-27 DIAGNOSIS — E11.9 TYPE 2 DIABETES MELLITUS WITHOUT COMPLICATION, WITHOUT LONG-TERM CURRENT USE OF INSULIN (HCC): ICD-10-CM

## 2019-12-27 DIAGNOSIS — Z00.00 ROUTINE MEDICAL EXAM: ICD-10-CM

## 2019-12-27 DIAGNOSIS — Z51.81 ENCOUNTER FOR MONITORING LEFLUNOMIDE THERAPY: Primary | ICD-10-CM

## 2019-12-27 DIAGNOSIS — Z79.899 ENCOUNTER FOR MONITORING LEFLUNOMIDE THERAPY: ICD-10-CM

## 2019-12-27 LAB
ALBUMIN SERPL BCP-MCNC: 4.3 G/DL (ref 3–5.2)
ALT SERPL W P-5'-P-CCNC: 38 U/L (ref 9–52)
AST SERPL W P-5'-P-CCNC: 29 U/L (ref 17–59)
BASOPHILS # BLD AUTO: 0 THOUSANDS/ΜL (ref 0–0.1)
BASOPHILS NFR BLD AUTO: 1 % (ref 0–1)
CHOLEST SERPL-MCNC: 193 MG/DL
CREAT SERPL-MCNC: 0.63 MG/DL (ref 0.7–1.5)
EOSINOPHIL # BLD AUTO: 0.1 THOUSAND/ΜL (ref 0–0.4)
EOSINOPHIL NFR BLD AUTO: 2 % (ref 0–6)
ERYTHROCYTE [DISTWIDTH] IN BLOOD BY AUTOMATED COUNT: 13 %
ERYTHROCYTE [SEDIMENTATION RATE] IN BLOOD: 40 MM/HOUR (ref 1–20)
EST. AVERAGE GLUCOSE BLD GHB EST-MCNC: 194 MG/DL
GFR SERPL CREATININE-BSD FRML MDRD: 122 ML/MIN/1.73SQ M
HBA1C MFR BLD: 8.4 % (ref 4.2–6.3)
HCT VFR BLD AUTO: 42.1 % (ref 41–53)
HDLC SERPL-MCNC: 29 MG/DL
HGB BLD-MCNC: 14.2 G/DL (ref 13.5–17.5)
LYMPHOCYTES # BLD AUTO: 2 THOUSANDS/ΜL (ref 0.5–4)
LYMPHOCYTES NFR BLD AUTO: 36 % (ref 25–45)
MCH RBC QN AUTO: 32.4 PG (ref 26–34)
MCHC RBC AUTO-ENTMCNC: 33.7 G/DL (ref 31–36)
MCV RBC AUTO: 96 FL (ref 80–100)
MONOCYTES # BLD AUTO: 0.5 THOUSAND/ΜL (ref 0.2–0.9)
MONOCYTES NFR BLD AUTO: 9 % (ref 1–10)
NEUTROPHILS # BLD AUTO: 2.9 THOUSANDS/ΜL (ref 1.8–7.8)
NEUTS SEG NFR BLD AUTO: 53 % (ref 45–65)
NONHDLC SERPL-MCNC: 164 MG/DL
PLATELET # BLD AUTO: 257 THOUSANDS/UL (ref 150–450)
PMV BLD AUTO: 9.7 FL (ref 8.9–12.7)
RBC # BLD AUTO: 4.38 MILLION/UL (ref 4.5–5.9)
TRIGL SERPL-MCNC: 465 MG/DL
WBC # BLD AUTO: 5.5 THOUSAND/UL (ref 4.5–11)

## 2019-12-27 PROCEDURE — 83036 HEMOGLOBIN GLYCOSYLATED A1C: CPT

## 2019-12-27 PROCEDURE — 80061 LIPID PANEL: CPT

## 2019-12-27 PROCEDURE — 84460 ALANINE AMINO (ALT) (SGPT): CPT

## 2019-12-27 PROCEDURE — 84450 TRANSFERASE (AST) (SGOT): CPT

## 2019-12-27 PROCEDURE — 82565 ASSAY OF CREATININE: CPT

## 2019-12-27 PROCEDURE — 36415 COLL VENOUS BLD VENIPUNCTURE: CPT

## 2019-12-27 PROCEDURE — 85652 RBC SED RATE AUTOMATED: CPT

## 2019-12-27 PROCEDURE — 85025 COMPLETE CBC W/AUTO DIFF WBC: CPT

## 2019-12-27 PROCEDURE — 82040 ASSAY OF SERUM ALBUMIN: CPT

## 2020-01-07 ENCOUNTER — TELEPHONE (OUTPATIENT)
Dept: FAMILY MEDICINE CLINIC | Facility: CLINIC | Age: 45
End: 2020-01-07

## 2020-01-08 DIAGNOSIS — E11.8 TYPE 2 DIABETES MELLITUS WITH COMPLICATION, WITHOUT LONG-TERM CURRENT USE OF INSULIN (HCC): ICD-10-CM

## 2020-02-03 ENCOUNTER — TELEPHONE (OUTPATIENT)
Dept: FAMILY MEDICINE CLINIC | Facility: CLINIC | Age: 45
End: 2020-02-03

## 2020-02-05 DIAGNOSIS — E11.9 TYPE 2 DIABETES MELLITUS WITHOUT COMPLICATION, WITHOUT LONG-TERM CURRENT USE OF INSULIN (HCC): ICD-10-CM

## 2020-02-05 NOTE — TELEPHONE ENCOUNTER
Patient requesting refills and refused to leave message on refill line     sitaGLIPtin (JANUVIA) 100 mg tablet

## 2020-02-12 ENCOUNTER — OFFICE VISIT (OUTPATIENT)
Dept: FAMILY MEDICINE CLINIC | Facility: CLINIC | Age: 45
End: 2020-02-12

## 2020-02-12 VITALS
SYSTOLIC BLOOD PRESSURE: 134 MMHG | WEIGHT: 192 LBS | HEART RATE: 107 BPM | RESPIRATION RATE: 16 BRPM | TEMPERATURE: 98 F | BODY MASS INDEX: 26.04 KG/M2 | DIASTOLIC BLOOD PRESSURE: 92 MMHG | OXYGEN SATURATION: 95 %

## 2020-02-12 DIAGNOSIS — E11.9 TYPE 2 DIABETES MELLITUS WITHOUT COMPLICATION, WITHOUT LONG-TERM CURRENT USE OF INSULIN (HCC): Primary | ICD-10-CM

## 2020-02-12 LAB — SL AMB POCT HEMOGLOBIN AIC: 9.7 (ref ?–6.5)

## 2020-02-12 PROCEDURE — 3046F HEMOGLOBIN A1C LEVEL >9.0%: CPT | Performed by: FAMILY MEDICINE

## 2020-02-12 PROCEDURE — 2022F DILAT RTA XM EVC RTNOPTHY: CPT | Performed by: FAMILY MEDICINE

## 2020-02-12 PROCEDURE — 83036 HEMOGLOBIN GLYCOSYLATED A1C: CPT | Performed by: FAMILY MEDICINE

## 2020-02-12 PROCEDURE — 1036F TOBACCO NON-USER: CPT | Performed by: FAMILY MEDICINE

## 2020-02-12 PROCEDURE — 99213 OFFICE O/P EST LOW 20 MIN: CPT | Performed by: FAMILY MEDICINE

## 2020-02-12 RX ORDER — HYDROXYZINE PAMOATE 50 MG/1
CAPSULE ORAL
COMMUNITY
Start: 2020-01-22 | End: 2022-04-04 | Stop reason: SDDI

## 2020-02-12 NOTE — PROGRESS NOTES
Assessment/Plan:    Type 2 diabetes mellitus ([facility]    Lab Results   Component Value Date    HGBA1C 9 7 (A) 02/12/2020    not at goal   likely secondary to dietary noncompliance   counseled extensively regarding lifestyle modifications   will begin an exercise program   scheduled for iris   microalbuminuria on follow-up visit   blood pressure is well controlled on today's visit, I feel elevated readings at the dentist's office may be secondary to anxiety /stress  Diagnoses and all orders for this visit:    Type 2 diabetes mellitus without complication, without long-term current use of insulin ([facility])  -     POCT hemoglobin A1c    Other orders  -     hydrOXYzine pamoate (VISTARIL) 50 mg capsule          Subjective:      Patient ID: Edin Stiles  is a 45 y o  male  HPI     47-year-old male with a previous medical history of rheumatoid arthritis, DM 2 presents to clinic for for follow-up  Was recently at dentist to have tooth extracted was found to be hypertensive and instructed to follow-up with PCP  Currently asymptomatic blood pressure is well controlled on today's exam      The following portions of the patient's history were reviewed and updated as appropriate: allergies, current medications, past family history, past medical history, past social history, past surgical history and problem list     Review of Systems   Constitutional: Negative for activity change, appetite change, chills, diaphoresis, fatigue and fever  HENT: Negative for congestion, ear pain, hearing loss, postnasal drip, rhinorrhea, sneezing, sore throat and tinnitus  Eyes: Negative for pain  Respiratory: Negative for apnea, cough, choking, chest tightness, shortness of breath and wheezing  Cardiovascular: Negative for chest pain, palpitations and leg swelling  Gastrointestinal: Negative for abdominal distention, abdominal pain, constipation, diarrhea, nausea and vomiting     Genitourinary: Negative for decreased urine volume and dysuria  Musculoskeletal: Negative for back pain  Skin: Negative for rash  Neurological: Negative for dizziness, tremors and syncope  Psychiatric/Behavioral: Negative for agitation and suicidal ideas  Objective:      /92 (BP Location: Right arm, Patient Position: Sitting, Cuff Size: Large)   Pulse (!) 107   Temp 98 °F (36 7 °C) (Temporal)   Resp 16   Wt 87 1 kg (192 lb)   SpO2 95%   BMI 26 04 kg/m²          Physical Exam   Constitutional: He is oriented to person, place, and time  He appears well-developed and well-nourished  No distress  HENT:   Head: Normocephalic and atraumatic  Right Ear: External ear normal    Left Ear: External ear normal    Mouth/Throat: Oropharynx is clear and moist  No oropharyngeal exudate  Eyes: Pupils are equal, round, and reactive to light  Conjunctivae are normal  No scleral icterus  Neck: Normal range of motion  Neck supple  No tracheal deviation present  No thyromegaly present  Cardiovascular: Normal rate, regular rhythm and normal heart sounds  No murmur heard  Pulmonary/Chest: Effort normal and breath sounds normal  No respiratory distress  He has no wheezes  Abdominal: Soft  Bowel sounds are normal  He exhibits no distension  There is no rebound  Musculoskeletal: Normal range of motion  He exhibits no edema  Neurological: He is alert and oriented to person, place, and time  Skin: Skin is warm  He is not diaphoretic  Rheumatoid nodules bilateral hands and right ankle  Venous stasis dermatitis lower extremities improved from prior examination   Psychiatric: He has a normal mood and affect

## 2020-02-12 NOTE — ASSESSMENT & PLAN NOTE
Lab Results   Component Value Date    HGBA1C 9 7 (A) 02/12/2020    not at goal   likely secondary to dietary noncompliance   counseled extensively regarding lifestyle modifications   will begin an exercise program   scheduled for iris   microalbuminuria on follow-up visit   blood pressure is well controlled on today's visit, I feel elevated readings at the dentist's office may be secondary to anxiety /stress

## 2020-03-02 ENCOUNTER — TELEPHONE (OUTPATIENT)
Dept: FAMILY MEDICINE CLINIC | Facility: CLINIC | Age: 45
End: 2020-03-02

## 2020-03-02 NOTE — TELEPHONE ENCOUNTER
Pt is calling requesting a call back from you regarding a personal matter  Ashleigh Mota He didn't want to give any more information       Correct phone number on file

## 2020-03-04 ENCOUNTER — TELEPHONE (OUTPATIENT)
Dept: FAMILY MEDICINE CLINIC | Facility: CLINIC | Age: 45
End: 2020-03-04

## 2020-03-04 DIAGNOSIS — M06.9 RHEUMATOID ARTHRITIS, INVOLVING UNSPECIFIED SITE, UNSPECIFIED RHEUMATOID FACTOR PRESENCE: Primary | ICD-10-CM

## 2020-03-19 ENCOUNTER — TRANSCRIBE ORDERS (OUTPATIENT)
Dept: ADMINISTRATIVE | Facility: HOSPITAL | Age: 45
End: 2020-03-19

## 2020-03-19 ENCOUNTER — APPOINTMENT (OUTPATIENT)
Dept: LAB | Facility: HOSPITAL | Age: 45
End: 2020-03-19
Payer: COMMERCIAL

## 2020-03-19 DIAGNOSIS — M05.79 RHEUMATOID ARTHRITIS INVOLVING MULTIPLE SITES WITH POSITIVE RHEUMATOID FACTOR (HCC): Primary | ICD-10-CM

## 2020-03-19 DIAGNOSIS — M05.79 RHEUMATOID ARTHRITIS INVOLVING MULTIPLE SITES WITH POSITIVE RHEUMATOID FACTOR (HCC): ICD-10-CM

## 2020-03-19 LAB
ALBUMIN SERPL BCP-MCNC: 4.2 G/DL (ref 3–5.2)
ALT SERPL W P-5'-P-CCNC: 34 U/L (ref 9–52)
AST SERPL W P-5'-P-CCNC: 24 U/L (ref 17–59)
BASOPHILS # BLD AUTO: 0 THOUSANDS/ΜL (ref 0–0.1)
BASOPHILS NFR BLD AUTO: 1 % (ref 0–1)
CREAT SERPL-MCNC: 0.57 MG/DL (ref 0.7–1.5)
EOSINOPHIL # BLD AUTO: 0.1 THOUSAND/ΜL (ref 0–0.4)
EOSINOPHIL NFR BLD AUTO: 1 % (ref 0–6)
ERYTHROCYTE [DISTWIDTH] IN BLOOD BY AUTOMATED COUNT: 12.5 %
ERYTHROCYTE [SEDIMENTATION RATE] IN BLOOD: 32 MM/HOUR (ref 1–20)
GFR SERPL CREATININE-BSD FRML MDRD: 127 ML/MIN/1.73SQ M
HCT VFR BLD AUTO: 43.6 % (ref 41–53)
HGB BLD-MCNC: 14.8 G/DL (ref 13.5–17.5)
LYMPHOCYTES # BLD AUTO: 2 THOUSANDS/ΜL (ref 0.5–4)
LYMPHOCYTES NFR BLD AUTO: 32 % (ref 25–45)
MCH RBC QN AUTO: 33 PG (ref 26–34)
MCHC RBC AUTO-ENTMCNC: 34 G/DL (ref 31–36)
MCV RBC AUTO: 97 FL (ref 80–100)
MONOCYTES # BLD AUTO: 0.5 THOUSAND/ΜL (ref 0.2–0.9)
MONOCYTES NFR BLD AUTO: 9 % (ref 1–10)
NEUTROPHILS # BLD AUTO: 3.5 THOUSANDS/ΜL (ref 1.8–7.8)
NEUTS SEG NFR BLD AUTO: 57 % (ref 45–65)
PLATELET # BLD AUTO: 282 THOUSANDS/UL (ref 150–450)
PMV BLD AUTO: 9.6 FL (ref 8.9–12.7)
RBC # BLD AUTO: 4.49 MILLION/UL (ref 4.5–5.9)
WBC # BLD AUTO: 6.1 THOUSAND/UL (ref 4.5–11)

## 2020-03-19 PROCEDURE — 84460 ALANINE AMINO (ALT) (SGPT): CPT

## 2020-03-19 PROCEDURE — 82565 ASSAY OF CREATININE: CPT

## 2020-03-19 PROCEDURE — 85652 RBC SED RATE AUTOMATED: CPT

## 2020-03-19 PROCEDURE — 84450 TRANSFERASE (AST) (SGOT): CPT

## 2020-03-19 PROCEDURE — 36415 COLL VENOUS BLD VENIPUNCTURE: CPT

## 2020-03-19 PROCEDURE — 82040 ASSAY OF SERUM ALBUMIN: CPT

## 2020-03-19 PROCEDURE — 85025 COMPLETE CBC W/AUTO DIFF WBC: CPT

## 2020-04-07 ENCOUNTER — TELEPHONE (OUTPATIENT)
Dept: FAMILY MEDICINE CLINIC | Facility: CLINIC | Age: 45
End: 2020-04-07

## 2020-04-07 DIAGNOSIS — E11.8 TYPE 2 DIABETES MELLITUS WITH COMPLICATION, WITHOUT LONG-TERM CURRENT USE OF INSULIN (HCC): ICD-10-CM

## 2020-04-30 DIAGNOSIS — E11.9 TYPE 2 DIABETES MELLITUS WITHOUT COMPLICATION, WITHOUT LONG-TERM CURRENT USE OF INSULIN (HCC): ICD-10-CM

## 2020-05-01 RX ORDER — SITAGLIPTIN 100 MG/1
TABLET, FILM COATED ORAL
Qty: 90 TABLET | Refills: 0 | Status: SHIPPED | OUTPATIENT
Start: 2020-05-01 | End: 2020-07-24

## 2020-05-20 ENCOUNTER — OFFICE VISIT (OUTPATIENT)
Dept: FAMILY MEDICINE CLINIC | Facility: CLINIC | Age: 45
End: 2020-05-20

## 2020-05-20 ENCOUNTER — TELEPHONE (OUTPATIENT)
Dept: FAMILY MEDICINE CLINIC | Facility: CLINIC | Age: 45
End: 2020-05-20

## 2020-05-20 VITALS
DIASTOLIC BLOOD PRESSURE: 70 MMHG | HEART RATE: 95 BPM | RESPIRATION RATE: 18 BRPM | HEIGHT: 72 IN | BODY MASS INDEX: 25.87 KG/M2 | SYSTOLIC BLOOD PRESSURE: 122 MMHG | WEIGHT: 191 LBS | TEMPERATURE: 97.5 F | OXYGEN SATURATION: 97 %

## 2020-05-20 DIAGNOSIS — A28.1 CAT-SCRATCH DISEASE: Primary | ICD-10-CM

## 2020-05-20 PROCEDURE — 1036F TOBACCO NON-USER: CPT | Performed by: FAMILY MEDICINE

## 2020-05-20 PROCEDURE — 3046F HEMOGLOBIN A1C LEVEL >9.0%: CPT | Performed by: FAMILY MEDICINE

## 2020-05-20 PROCEDURE — 99213 OFFICE O/P EST LOW 20 MIN: CPT | Performed by: FAMILY MEDICINE

## 2020-05-20 PROCEDURE — 3008F BODY MASS INDEX DOCD: CPT | Performed by: FAMILY MEDICINE

## 2020-05-20 PROCEDURE — 2022F DILAT RTA XM EVC RTNOPTHY: CPT | Performed by: FAMILY MEDICINE

## 2020-05-20 RX ORDER — AZITHROMYCIN 250 MG/1
TABLET, FILM COATED ORAL
Qty: 6 TABLET | Refills: 0 | Status: SHIPPED | OUTPATIENT
Start: 2020-05-20 | End: 2020-05-25

## 2020-06-12 ENCOUNTER — OFFICE VISIT (OUTPATIENT)
Dept: FAMILY MEDICINE CLINIC | Facility: CLINIC | Age: 45
End: 2020-06-12

## 2020-06-12 VITALS
RESPIRATION RATE: 20 BRPM | DIASTOLIC BLOOD PRESSURE: 78 MMHG | HEIGHT: 72 IN | TEMPERATURE: 98.1 F | HEART RATE: 118 BPM | OXYGEN SATURATION: 96 % | SYSTOLIC BLOOD PRESSURE: 124 MMHG | BODY MASS INDEX: 25.6 KG/M2 | WEIGHT: 189 LBS

## 2020-06-12 DIAGNOSIS — E66.3 OVERWEIGHT: ICD-10-CM

## 2020-06-12 DIAGNOSIS — E11.9 TYPE 2 DIABETES MELLITUS WITHOUT COMPLICATION, WITHOUT LONG-TERM CURRENT USE OF INSULIN (HCC): Primary | ICD-10-CM

## 2020-06-12 DIAGNOSIS — M06.9 RHEUMATOID ARTHRITIS, INVOLVING UNSPECIFIED SITE, UNSPECIFIED RHEUMATOID FACTOR PRESENCE: ICD-10-CM

## 2020-06-12 LAB
CREAT UR-MCNC: 141 MG/DL
MICROALBUMIN UR-MCNC: 9.7 MG/L (ref 0–20)
MICROALBUMIN/CREAT 24H UR: 7 MG/G CREATININE (ref 0–30)
SL AMB POCT HEMOGLOBIN AIC: 9.6 (ref ?–6.5)

## 2020-06-12 PROCEDURE — 83036 HEMOGLOBIN GLYCOSYLATED A1C: CPT | Performed by: FAMILY MEDICINE

## 2020-06-12 PROCEDURE — 3046F HEMOGLOBIN A1C LEVEL >9.0%: CPT | Performed by: FAMILY MEDICINE

## 2020-06-12 PROCEDURE — 3061F NEG MICROALBUMINURIA REV: CPT | Performed by: FAMILY MEDICINE

## 2020-06-12 PROCEDURE — 1036F TOBACCO NON-USER: CPT | Performed by: FAMILY MEDICINE

## 2020-06-12 PROCEDURE — 3008F BODY MASS INDEX DOCD: CPT | Performed by: FAMILY MEDICINE

## 2020-06-12 PROCEDURE — 82043 UR ALBUMIN QUANTITATIVE: CPT | Performed by: FAMILY MEDICINE

## 2020-06-12 PROCEDURE — 99214 OFFICE O/P EST MOD 30 MIN: CPT | Performed by: FAMILY MEDICINE

## 2020-06-12 PROCEDURE — 82570 ASSAY OF URINE CREATININE: CPT | Performed by: FAMILY MEDICINE

## 2020-06-12 PROCEDURE — 2022F DILAT RTA XM EVC RTNOPTHY: CPT | Performed by: FAMILY MEDICINE

## 2020-07-13 ENCOUNTER — TRANSCRIBE ORDERS (OUTPATIENT)
Dept: LAB | Facility: HOSPITAL | Age: 45
End: 2020-07-13

## 2020-07-13 ENCOUNTER — APPOINTMENT (OUTPATIENT)
Dept: LAB | Facility: HOSPITAL | Age: 45
End: 2020-07-13
Payer: COMMERCIAL

## 2020-07-13 DIAGNOSIS — M05.79 RHEUMATOID ARTHRITIS INVOLVING MULTIPLE SITES WITH POSITIVE RHEUMATOID FACTOR (HCC): Primary | ICD-10-CM

## 2020-07-13 DIAGNOSIS — M05.79 RHEUMATOID ARTHRITIS INVOLVING MULTIPLE SITES WITH POSITIVE RHEUMATOID FACTOR (HCC): ICD-10-CM

## 2020-07-13 LAB
BASOPHILS # BLD AUTO: 0 THOUSANDS/ΜL (ref 0–0.1)
BASOPHILS NFR BLD AUTO: 0 % (ref 0–1)
EOSINOPHIL # BLD AUTO: 0.1 THOUSAND/ΜL (ref 0–0.4)
EOSINOPHIL NFR BLD AUTO: 1 % (ref 0–6)
ERYTHROCYTE [DISTWIDTH] IN BLOOD BY AUTOMATED COUNT: 12.9 %
HCT VFR BLD AUTO: 42.1 % (ref 41–53)
HGB BLD-MCNC: 14 G/DL (ref 13.5–17.5)
IGA SERPL-MCNC: 327 MG/DL (ref 70–400)
IGG SERPL-MCNC: 648 MG/DL (ref 700–1600)
IGM SERPL-MCNC: 90 MG/DL (ref 40–230)
LYMPHOCYTES # BLD AUTO: 1.8 THOUSANDS/ΜL (ref 0.5–4)
LYMPHOCYTES NFR BLD AUTO: 24 % (ref 25–45)
MCH RBC QN AUTO: 32.4 PG (ref 26–34)
MCHC RBC AUTO-ENTMCNC: 33.2 G/DL (ref 31–36)
MCV RBC AUTO: 97 FL (ref 80–100)
MONOCYTES # BLD AUTO: 0.5 THOUSAND/ΜL (ref 0.2–0.9)
MONOCYTES NFR BLD AUTO: 7 % (ref 1–10)
NEUTROPHILS # BLD AUTO: 5.2 THOUSANDS/ΜL (ref 1.8–7.8)
NEUTS SEG NFR BLD AUTO: 68 % (ref 45–65)
PLATELET # BLD AUTO: 202 THOUSANDS/UL (ref 150–450)
PMV BLD AUTO: 10.1 FL (ref 8.9–12.7)
RBC # BLD AUTO: 4.33 MILLION/UL (ref 4.5–5.9)
WBC # BLD AUTO: 7.7 THOUSAND/UL (ref 4.5–11)

## 2020-07-13 PROCEDURE — 82784 ASSAY IGA/IGD/IGG/IGM EACH: CPT

## 2020-07-13 PROCEDURE — 85025 COMPLETE CBC W/AUTO DIFF WBC: CPT

## 2020-07-13 PROCEDURE — 86431 RHEUMATOID FACTOR QUANT: CPT

## 2020-07-13 PROCEDURE — 86200 CCP ANTIBODY: CPT

## 2020-07-13 PROCEDURE — 86430 RHEUMATOID FACTOR TEST QUAL: CPT

## 2020-07-13 PROCEDURE — 36415 COLL VENOUS BLD VENIPUNCTURE: CPT

## 2020-07-14 LAB
CRYOGLOB RF SER-ACNC: ABNORMAL [IU]/ML
RHEUMATOID FACT SER QL LA: POSITIVE

## 2020-07-15 LAB — CCP IGA+IGG SERPL IA-ACNC: >250 UNITS (ref 0–19)

## 2020-07-24 DIAGNOSIS — E11.9 TYPE 2 DIABETES MELLITUS WITHOUT COMPLICATION, WITHOUT LONG-TERM CURRENT USE OF INSULIN (HCC): ICD-10-CM

## 2020-07-24 RX ORDER — SITAGLIPTIN 100 MG/1
TABLET, FILM COATED ORAL
Qty: 90 TABLET | Refills: 0 | Status: SHIPPED | OUTPATIENT
Start: 2020-07-24 | End: 2020-11-02

## 2020-07-28 ENCOUNTER — TELEPHONE (OUTPATIENT)
Dept: FAMILY MEDICINE CLINIC | Facility: CLINIC | Age: 45
End: 2020-07-28

## 2020-07-28 ENCOUNTER — APPOINTMENT (OUTPATIENT)
Dept: LAB | Facility: HOSPITAL | Age: 45
End: 2020-07-28
Payer: COMMERCIAL

## 2020-07-28 ENCOUNTER — TRANSCRIBE ORDERS (OUTPATIENT)
Dept: LAB | Facility: HOSPITAL | Age: 45
End: 2020-07-28

## 2020-07-28 DIAGNOSIS — Z79.899 ENCOUNTER FOR LONG-TERM (CURRENT) USE OF OTHER MEDICATIONS: Primary | ICD-10-CM

## 2020-07-28 LAB
ALBUMIN SERPL BCP-MCNC: 3.5 G/DL (ref 3–5.2)
ALP SERPL-CCNC: 87 U/L (ref 43–122)
ALT SERPL W P-5'-P-CCNC: 16 U/L (ref 9–52)
ANION GAP SERPL CALCULATED.3IONS-SCNC: 7 MMOL/L (ref 5–14)
AST SERPL W P-5'-P-CCNC: 16 U/L (ref 17–59)
BILIRUB SERPL-MCNC: 0.4 MG/DL
BUN SERPL-MCNC: 8 MG/DL (ref 5–25)
CALCIUM SERPL-MCNC: 9.3 MG/DL (ref 8.4–10.2)
CHLORIDE SERPL-SCNC: 104 MMOL/L (ref 97–108)
CHOLEST SERPL-MCNC: 159 MG/DL
CO2 SERPL-SCNC: 27 MMOL/L (ref 22–30)
CREAT SERPL-MCNC: 0.61 MG/DL (ref 0.7–1.5)
GFR SERPL CREATININE-BSD FRML MDRD: 123 ML/MIN/1.73SQ M
GLUCOSE P FAST SERPL-MCNC: 205 MG/DL (ref 70–99)
HDLC SERPL-MCNC: 27 MG/DL
LDLC SERPL CALC-MCNC: 65 MG/DL
NONHDLC SERPL-MCNC: 132 MG/DL
POTASSIUM SERPL-SCNC: 3.8 MMOL/L (ref 3.6–5)
PROT SERPL-MCNC: 6.4 G/DL (ref 5.9–8.4)
SODIUM SERPL-SCNC: 138 MMOL/L (ref 137–147)
TRIGL SERPL-MCNC: 333 MG/DL

## 2020-07-28 PROCEDURE — 80061 LIPID PANEL: CPT

## 2020-07-28 PROCEDURE — 80053 COMPREHEN METABOLIC PANEL: CPT

## 2020-07-28 PROCEDURE — 36415 COLL VENOUS BLD VENIPUNCTURE: CPT

## 2020-07-28 NOTE — TELEPHONE ENCOUNTER
Pt called requesting a test for a kidney function test and also requesting if you can give him a call back   Thank you

## 2020-08-11 ENCOUNTER — OFFICE VISIT (OUTPATIENT)
Dept: FAMILY MEDICINE CLINIC | Facility: CLINIC | Age: 45
End: 2020-08-11

## 2020-08-11 VITALS
HEART RATE: 115 BPM | HEIGHT: 72 IN | OXYGEN SATURATION: 96 % | RESPIRATION RATE: 16 BRPM | BODY MASS INDEX: 25.6 KG/M2 | DIASTOLIC BLOOD PRESSURE: 90 MMHG | SYSTOLIC BLOOD PRESSURE: 132 MMHG | TEMPERATURE: 98 F | WEIGHT: 189 LBS

## 2020-08-11 DIAGNOSIS — L03.116 CELLULITIS OF LEFT LOWER EXTREMITY: Primary | ICD-10-CM

## 2020-08-11 PROCEDURE — 3046F HEMOGLOBIN A1C LEVEL >9.0%: CPT | Performed by: PHYSICIAN ASSISTANT

## 2020-08-11 PROCEDURE — 3008F BODY MASS INDEX DOCD: CPT | Performed by: PHYSICIAN ASSISTANT

## 2020-08-11 PROCEDURE — 2022F DILAT RTA XM EVC RTNOPTHY: CPT | Performed by: PHYSICIAN ASSISTANT

## 2020-08-11 PROCEDURE — 1036F TOBACCO NON-USER: CPT | Performed by: PHYSICIAN ASSISTANT

## 2020-08-11 PROCEDURE — 99213 OFFICE O/P EST LOW 20 MIN: CPT | Performed by: PHYSICIAN ASSISTANT

## 2020-08-11 RX ORDER — CEPHALEXIN 500 MG/1
500 CAPSULE ORAL EVERY 8 HOURS SCHEDULED
Qty: 21 CAPSULE | Refills: 0 | Status: SHIPPED | OUTPATIENT
Start: 2020-08-11 | End: 2020-08-18

## 2020-08-11 RX ORDER — PROPRANOLOL HYDROCHLORIDE 10 MG/1
TABLET ORAL
COMMUNITY
Start: 2020-07-02

## 2020-08-11 RX ORDER — TOFACITINIB 11 MG/1
11 TABLET, FILM COATED, EXTENDED RELEASE ORAL DAILY
COMMUNITY
Start: 2020-07-30 | End: 2022-01-04

## 2020-08-11 NOTE — PATIENT INSTRUCTIONS
Cellulitis   WHAT YOU NEED TO KNOW:   Cellulitis is a skin infection caused by bacteria  Cellulitis may go away on its own or you may need treatment  Your healthcare provider may draw a [place] around the outside edges of your cellulitis  If your cellulitis spreads, your healthcare provider will see it outside of the [place]  DISCHARGE INSTRUCTIONS:   Call 911 if:   · You have sudden trouble breathing or chest pain  Return to the emergency department if:   · Your wound gets larger and more painful  · You feel a crackling under your skin when you touch it  · You have purple dots or bumps on your skin, or you see bleeding under your skin  · You have new swelling and pain in your legs  · The red, warm, swollen area gets larger  · You see red streaks coming from the infected area  Contact your healthcare provider if:   · You have a fever  · Your fever or pain does not go away or gets worse  · The area does not get smaller after 2 days of antibiotics  · Your skin is flaking or peeling off  · You have questions or concerns about your condition or care  Medicines:   · Antibiotics  help treat the bacterial infection  · NSAIDs , such as ibuprofen, help decrease swelling, pain, and fever  NSAIDs can cause stomach bleeding or kidney problems in certain people  If you take blood thinner medicine, always ask if NSAIDs are safe for you  Always read the medicine label and follow directions  Do not give these medicines to children under 10months of age without direction from your child's healthcare provider  · Acetaminophen  decreases pain and fever  It is available without a doctor's order  Ask how much to take and how often to take it  Follow directions  Read the labels of all other medicines you are using to see if they also contain acetaminophen, or ask your doctor or pharmacist  Acetaminophen can cause liver damage if not taken correctly   Do not use more than 4 grams (4,000 milligrams) total of acetaminophen in one day  · Take your medicine as directed  Contact your healthcare provider if you think your medicine is not helping or if you have side effects  Tell him or her if you are allergic to any medicine  Keep a list of the medicines, vitamins, and herbs you take  Include the amounts, and when and why you take them  Bring the list or the pill bottles to follow-up visits  Carry your medicine list with you in case of an emergency  Self-care:   · Elevate the area above the level of your heart  as often as you can  This will help decrease swelling and pain  Prop the area on pillows or blankets to keep it elevated comfortably  · Clean the area daily until the wound scabs over  Gently wash the area with soap and water  Pat dry  Use dressings as directed  · Place cool or warm, wet cloths on the area as directed  Use clean cloths and clean water  Leave it on the area until the cloth is room temperature  Pat the area dry with a clean, dry cloth  The cloths may help decrease pain  Prevent cellulitis:   · Do not scratch bug bites or areas of injury  You increase your risk for cellulitis by scratching these areas  · Do not share personal items, such as towels, clothing, and razors  · Clean exercise equipment  with germ-killing  before and after you use it  · Wash your hands often  Use soap and water  Wash your hands after you use the bathroom, change a child's diapers, or sneeze  Wash your hands before you prepare or eat food  Use lotion to prevent dry, cracked skin  · Wear pressure stockings as directed  You may be told to wear the stockings if you have peripheral edema  The stockings improve blood flow and decrease swelling  · Treat athlete's foot  This can help prevent the spread of a bacterial skin infection  Follow up with your healthcare provider within 3 days, or as directed:   Your healthcare provider will check if your cellulitis is getting better  You may need different medicine  Write down your questions so you remember to ask them during your visits  © 2017 [address] Information is for End User's use only and may not be sold, redistributed or otherwise used for commercial purposes  All illustrations and images included in CareNotes® are the copyrighted property of A D A M , Inc  or [employer]  The above information is an  only  It is not intended as medical advice for individual conditions or treatments  Talk to your doctor, nurse or pharmacist before following any medical regimen to see if it is safe and effective for you

## 2020-08-11 NOTE — PROGRESS NOTES
Assessment/Plan:    Cellulitis of left lower extremity  - Will prescribe Keflex 500 mg, to be taken every 8 hours for 7 days  Advised patient to be sure he takes the entire course of the antibiotics even if she starts to feel better beforehand   - Advised to call the office or report to the ED if symptoms worsen, persist, or new symptoms arise such as fevers  Diagnoses and all orders for this visit:    Cellulitis of left lower extremity  -     cephalexin (KEFLEX) 500 mg capsule; Take 1 capsule (500 mg total) by mouth every 8 (eight) hours for 7 days    Other orders  -     propranolol (INDERAL) 10 mg tablet  -     Tofacitinib Citrate ER (Xeljanz XR) 11 MG TB24; Take 11 mg by mouth daily          All of patients questions were answered  Patient understands and agrees with the above plan  Return if symptoms worsen or fail to improve  Edmundo Santos PA-C  08/11/20  [facility] [place]           Subjective:     Patient ID: Debo Quarles   is a 45 y o  male with known PMH of   Type 2 diabetes mellitus, rheumatoid arthritis, GERD who presents today in office for  Left leg redness and pain  - Patient is a 45 y o  male who presents today for  Left leg redness and pain  Patient notes he first noticed a "lump" a few weeks ago behind his left lower leg  Patient notes then a few days ago the area became red, hot, painful, and a little swollen  Patient denies any fevers, chills, nausea, vomiting  Patient notes he believes he did have something like this in the past   Per chart review, patient does have a history cellulitis of left lower leg and was prescribed Keflex in the past  Patient denies history of DVT  Patient denies any recent long car rides or airplane rides  Patient does not recall any cuts or injuries to the area        The following portions of the patient's history were reviewed and updated as appropriate: allergies, current medications, past family history, past medical history, past social history, past surgical history and problem list         Review of Systems   Constitutional: Negative for chills and fever  HENT: Negative for congestion and sore throat  Eyes: Negative for pain  Respiratory: Negative for cough, chest tightness and shortness of breath  Cardiovascular: Negative for chest pain and palpitations  Gastrointestinal: Negative for abdominal pain, constipation, diarrhea, nausea and vomiting  Genitourinary: Negative for difficulty urinating and dysuria  Musculoskeletal: Negative for arthralgias  Skin: Positive for color change (Posterior aspect of left lower leg)  Neurological: Negative for dizziness and headaches  Psychiatric/Behavioral: The patient is not nervous/anxious  Objective:   Vitals:    08/11/20 1419   BP: 132/90   BP Location: Left arm   Patient Position: Sitting   Cuff Size: Standard   Pulse: (!) 115   Resp: 16   Temp: 98 °F (36 7 °C)   TempSrc: Temporal   SpO2: 96%   Weight: 85 7 kg (189 lb)   Height: 6' (1 829 m)         Physical Exam  Vitals signs and nursing note reviewed  Constitutional:       Appearance: He is well-developed  HENT:      Head: Normocephalic and atraumatic  Right Ear: External ear normal       Left Ear: External ear normal       Nose: Nose normal       Mouth/Throat:      Pharynx: Uvula midline  Eyes:      Conjunctiva/sclera: Conjunctivae normal    Neck:      Musculoskeletal: Normal range of motion and neck supple  Cardiovascular:      Rate and Rhythm: Normal rate and regular rhythm  Heart sounds: Normal heart sounds  No murmur  Pulmonary:      Effort: Pulmonary effort is normal       Breath sounds: Normal breath sounds  No wheezing  Skin:     General: Skin is warm and dry  Findings: Rash present  Neurological:      Mental Status: He is alert and oriented to person, place, and time     Psychiatric:         Mood and Affect: Mood normal          Speech: Speech normal          Behavior: Behavior normal

## 2020-08-19 ENCOUNTER — TELEMEDICINE (OUTPATIENT)
Dept: FAMILY MEDICINE CLINIC | Facility: CLINIC | Age: 45
End: 2020-08-19

## 2020-08-19 DIAGNOSIS — L03.116 CELLULITIS OF LEFT LOWER EXTREMITY: Primary | ICD-10-CM

## 2020-08-19 PROCEDURE — 1036F TOBACCO NON-USER: CPT | Performed by: FAMILY MEDICINE

## 2020-08-19 PROCEDURE — 99213 OFFICE O/P EST LOW 20 MIN: CPT | Performed by: FAMILY MEDICINE

## 2020-08-19 RX ORDER — SULFAMETHOXAZOLE AND TRIMETHOPRIM 800; 160 MG/1; MG/1
1 TABLET ORAL EVERY 12 HOURS SCHEDULED
Qty: 14 TABLET | Refills: 0 | Status: SHIPPED | OUTPATIENT
Start: 2020-08-19 | End: 2020-08-26

## 2020-08-19 NOTE — PROGRESS NOTES
Virtual Brief Visit    Assessment/Plan:    Problem List Items Addressed This Visit     None      Visit Diagnoses     Cellulitis of left lower extremity    -  Primary  - Last day of Kelfex today  Patient denies any worsening however it has stayed the same with erythema, warmth and a palpable "lump"  - Unfortunately no video chat available to visualize the leg    - Denies any systemic symptoms of fever, chills, myalgias or any joint involvement  - Plan is to get patient into the office for an in person visit this week  Sent urgent message to staff  - In the mean time will start Bactrim for MRSA cover  Relevant Medications    sulfamethoxazole-trimethoprim (BACTRIM DS) 800-160 mg per tablet        Reason for visit is   Chief Complaint   Patient presents with    Virtual Brief Visit      Encounter provider Ila Saenz MD    Provider located at [address] [place] [id number] [phone]    Recent Visits  No visits were found meeting these conditions  Showing recent visits within past 7 days and meeting all other requirements     Today's Visits  Date Type Provider Dept   08/19/20 Telemedicine Ila Saenz MD  [facility] [place]   Showing today's visits and meeting all other requirements     Future Appointments  No visits were found meeting these conditions  Showing future appointments within next 150 days and meeting all other requirements      After connecting through telephone, the patient was identified by name and date of birth  Jamiejayesh Workman Tra  was informed that this is a telemedicine visit and that the visit is being conducted through telephone  My office door was closed  The following individuals were in the room with me and the patient informed Dr Magda Walker  He acknowledged consent and understanding of privacy and security of the platform   The patient has agreed to participate and understands he can discontinue the visit at any time     Patient is aware this is a billable service  Subjective    Jamie Escamilla  is a pleasant 45 y o  male who reports no improvement in his leg infection after taking most of his antibiotics for cellulitis he was started on at a visit in the office on 8/11/2020  He has one pill left  No past medical history on file  No past surgical history on file      Current Outpatient Medications   Medication Sig Dispense Refill    ACCU-CHEK SOFTCLIX LANCETS lancets TEST once daily BEFORE BREAKFAST      acetaminophen (TYLENOL) 650 mg CR tablet Take 1 tablet (650 mg total) by mouth every 8 (eight) hours as needed for mild pain 30 tablet 3    Adalimumab 40 MG/0 8ML PNKT Inject 40 mg under the skin      Alcohol Swabs (ALCOHOL PADS) 70 % PADS use as directed once daily      ALPRAZolam (XANAX) 2 MG tablet 3 (three) times a day      amoxicillin (AMOXIL) 500 mg capsule       fluticasone (FLONASE) 50 mcg/act nasal spray 1 spray into each nostril daily 1 Bottle 2    glucose blood test strip test once daily before breakfast      HUMIRA PEN 40 MG/0 4ML PNKT       hydrOXYzine HCL (ATARAX) 25 mg tablet       hydrOXYzine HCL (ATARAX) 50 mg tablet       hydrOXYzine pamoate (VISTARIL) 50 mg capsule       ibuprofen (MOTRIN) 600 mg tablet       JANUVIA 100 MG tablet take 1 tablet by mouth once daily 90 tablet 0    leflunomide (ARAVA) 20 MG tablet Take 20 mg by mouth daily  0    meloxicam (MOBIC) 15 mg tablet take 1 tablet by mouth once daily 30 tablet 2    metFORMIN (GLUCOPHAGE) 1000 MG tablet Take 1 tablet (1,000 mg total) by mouth 2 (two) times a day with meals 180 tablet 1    montelukast (SINGULAIR) 10 mg tablet Take 1 tablet (10 mg total) by mouth daily at bedtime 30 tablet 1    omeprazole (PriLOSEC) 20 mg delayed release capsule Take 1 capsule (20 mg total) by mouth daily 30 capsule 1    propranolol (INDERAL) 10 mg tablet       QUEtiapine (SEROQUEL) 200 mg tablet daily as needed       QUEtiapine (SEROquel) 300 mg tablet Take by mouth daily as needed      sulfamethoxazole-trimethoprim (BACTRIM DS) 800-160 mg per tablet Take 1 tablet by mouth every 12 (twelve) hours for 7 days 14 tablet 0    Tofacitinib Citrate ER (Xeljanz XR) 11 MG TB24 Take 11 mg by mouth daily       No current facility-administered medications for this visit  No Known Allergies    Review of Systems   Constitutional: Negative for activity change, appetite change, chills, diaphoresis, fatigue and fever  HENT: Negative for congestion, rhinorrhea and sore throat  Eyes: Negative for visual disturbance  Respiratory: Negative for cough, chest tightness, shortness of breath and wheezing  Cardiovascular: Negative for chest pain, palpitations and leg swelling  Gastrointestinal: Negative for abdominal pain, constipation, diarrhea, nausea and vomiting  Musculoskeletal: Negative for arthralgias, joint swelling and myalgias  Skin: Positive for rash (left lower leg described at the back of his leg with redness, warm to touch with mild tenderness to touch with a palpable lump that is not obvious with just looking at it)  Neurological: Negative for dizziness, weakness, light-headedness and headaches  Psychiatric/Behavioral: Negative for agitation and behavioral problems  There were no vitals filed for this visit  However patient was able to speak in full sentences, had no conversational dyspnea and was in no distress  I spent 20 minutes directly with the patient during this visit    [facility]  acknowledges that he has consented to an online visit or consultation  He understands that the online visit is based solely on information provided by him, and that, in the absence of a face-to-face physical evaluation by the physician, the diagnosis he receives is both limited and provisional in terms of accuracy and completeness   This is not intended to replace a full medical face-to-face evaluation by the physician  Jamie Dutta  understands and accepts these terms  It was my intent to perform this visit via video technology but the patient was not able to do a video connection so the visit was completed via audio telephone only        Aletha Pereira MD  08/19/20  5:34 PM

## 2020-08-19 NOTE — PATIENT INSTRUCTIONS
Cellulitis   AMBULATORY CARE:   Cellulitis  is a skin infection caused by bacteria  Cellulitis usually appears on the legs and feet, arms and hands, or face  Common signs and symptoms include the following:   · A red, warm, swollen area on your skin    · Pain when the area is touched    · Bumps or blisters (abscess) that may drain pus    · Bumpy, raised skin that feels like an orange peel  Call 911 if:   · You have sudden trouble breathing or chest pain  Seek care immediately if:   · Your wound gets larger and more painful  · You feel a crackling under your skin when you touch it  · You have purple dots or bumps on your skin, or you see bleeding under your skin  · You have new swelling and pain in your legs  · The red, warm, swollen area gets larger  · You see red streaks coming from the infected area  Contact your healthcare provider if:   · You have a fever  · Your fever or pain does not go away or gets worse  · The area does not get smaller after 2 days of antibiotics  · Your skin is flaking or peeling off  · You have questions or concerns about your condition or care  Treatment for cellulitis  may decrease symptoms, stop the infection from spreading, and cure the infection  Treatment depends on how severe your cellulitis is  Cellulitis may go away on its own  You may  instead need antibiotics to help treat the bacterial infection and medicines for pain  Your healthcare provider may draw a [place] around the edges of your cellulitis  If your cellulitis spreads, your healthcare provider will see it outside of the [place]  Manage your symptoms:   · Elevate the area above the level of your heart  as often as you can  This will help decrease swelling and pain  Prop the area on pillows or blankets to keep it elevated comfortably  · Clean the area daily until the wound scabs over  Gently wash the area with soap and water  Pat dry  Use dressings as directed       · Place cool or warm, wet cloths on the area as directed  Use clean cloths and clean water  Leave it on the area until the cloth is room temperature  Pat the area dry with a clean, dry cloth  The cloths may help decrease pain  Prevent cellulitis:   · Do not scratch bug bites or areas of injury  You increase your risk for cellulitis by scratching these areas  · Do not share personal items, such as towels, clothing, and razors  · Clean exercise equipment  with germ-killing  before and after you use it  · Wash your hands often  Use soap and water  Wash your hands after you use the bathroom, change a child's diapers, or sneeze  Wash your hands before you prepare or eat food  Use lotion to prevent dry, cracked skin  · Wear pressure stockings as directed  You may be told to wear the stockings if you have peripheral edema  The stockings improve blood flow and decrease swelling  · Treat athlete's foot  This can help prevent the spread of a bacterial skin infection  Follow up with your healthcare provider within 3 days, or as directed: Your healthcare provider will check if your cellulitis is getting better  You may need different medicine  Write down your questions so you remember to ask them during your visits  © 2017 [address]  Information is for End User's use only and may not be sold, redistributed or otherwise used for commercial purposes  All illustrations and images included in CareNotes® are the copyrighted property of [employer] M [employer] Inc  or [employer]  The above information is an  only  It is not intended as medical advice for individual conditions or treatments  Talk to your doctor, nurse or pharmacist before following any medical regimen to see if it is safe and effective for you

## 2020-08-20 ENCOUNTER — TELEPHONE (OUTPATIENT)
Dept: FAMILY MEDICINE CLINIC | Facility: CLINIC | Age: 45
End: 2020-08-20

## 2020-08-20 NOTE — TELEPHONE ENCOUNTER
----- Message from Ruba Kelly MD sent at 8/19/2020  5:36 PM EDT -----  Regarding: Patient needs appointment in person  Patient needs in person appointment this week for follow up of cellulitis that is not improving with antibiotics  If possible with Swapna cisneros she saw him earlier  Thank you       Ruba Kelly MD  08/19/20  5:38 PM

## 2020-08-21 ENCOUNTER — OFFICE VISIT (OUTPATIENT)
Dept: FAMILY MEDICINE CLINIC | Facility: CLINIC | Age: 45
End: 2020-08-21

## 2020-08-21 VITALS
HEART RATE: 100 BPM | TEMPERATURE: 98 F | SYSTOLIC BLOOD PRESSURE: 142 MMHG | WEIGHT: 188 LBS | OXYGEN SATURATION: 96 % | RESPIRATION RATE: 16 BRPM | DIASTOLIC BLOOD PRESSURE: 90 MMHG | BODY MASS INDEX: 25.47 KG/M2 | HEIGHT: 72 IN

## 2020-08-21 DIAGNOSIS — Z20.822 SUSPECTED COVID-19 VIRUS INFECTION: ICD-10-CM

## 2020-08-21 DIAGNOSIS — M79.89 PAIN AND SWELLING OF LOWER LEG, LEFT: Primary | ICD-10-CM

## 2020-08-21 DIAGNOSIS — L02.419 ABSCESS OF LOWER LEG: ICD-10-CM

## 2020-08-21 DIAGNOSIS — M79.662 PAIN AND SWELLING OF LOWER LEG, LEFT: Primary | ICD-10-CM
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
## 2020-08-21 PROBLEM — L03.116 CELLULITIS OF LEFT LOWER LEG: Status: ACTIVE | Noted: 2020-08-21

## 2020-08-21 PROCEDURE — 1036F TOBACCO NON-USER: CPT | Performed by: FAMILY MEDICINE

## 2020-08-21 PROCEDURE — 99214 OFFICE O/P EST MOD 30 MIN: CPT | Performed by: FAMILY MEDICINE

## 2020-08-21 PROCEDURE — 2022F DILAT RTA XM EVC RTNOPTHY: CPT | Performed by: FAMILY MEDICINE

## 2020-08-21 PROCEDURE — 3008F BODY MASS INDEX DOCD: CPT | Performed by: FAMILY MEDICINE

## 2020-08-21 PROCEDURE — 3046F HEMOGLOBIN A1C LEVEL >9.0%: CPT | Performed by: FAMILY MEDICINE

## 2020-08-21 RX ORDER — LEVOFLOXACIN 750 MG/1
750 TABLET ORAL EVERY 24 HOURS
Qty: 5 TABLET | Refills: 0 | Status: SHIPPED | OUTPATIENT
Start: 2020-08-21 | End: 2020-08-26

## 2020-08-21 NOTE — LETTER
August 21, 2020     Patient: Komal Gama  YOB: 1975   Date of Visit: 8/21/2020       To Whom it May Concern:    Alas Maria Antonia is under my professional care  He was seen in my office on 8/21/2020  He return to work after revaluation in one week  If you have any questions or concerns, please don't hesitate to call           Sincerely,          Vance Langston MD

## 2020-08-21 NOTE — PATIENT INSTRUCTIONS
Go across the street to the hospital to get a ultrasound done of your leg  You need to get to ultrasound better ordered already  We will get these results by tomorrow hopefully and contact you with these results for any further action needed  Stop taking Bactrim antibiotic  Start new antibiotic of Levaquin 750 mg once daily for 5 days we will contact you after you get your ultrasound results back  Cat Scratch Disease   WHAT YOU NEED TO KNOW:   Cat-scratch disease (CSD) is an infection caused by bacteria in a cat's mouth  You can get CSD by being scratched, licked, or bitten by an infected cat  The germs usually spread after the cat licks its paws then scratches or bites human skin  DISCHARGE INSTRUCTIONS:   Return to the emergency department if:   · You have severe pain in your stomach, muscles, bones, or joints  · You have severe pain in the lymph nodes in your neck, armpit, or groin  · You have seizures, headaches, or cannot think clearly  Contact your healthcare provider if:   · You have a fever, sore throat, or headache  · You notice swelling in your neck, armpit, or groin  · You have stomach, muscle, or joint pain  · You have a skin rash, itching, or swelling after you take your medicine  · You have questions or concerns about your condition or care  Medicines:   · Medicines  may be given to help treat a bacterial infection or decrease pain, fever, and swelling  · Take your medicine as directed  Contact your healthcare provider if you think your medicine is not helping or if you have side effects  Tell him or her if you are allergic to any medicine  Keep a list of the medicines, vitamins, and herbs you take  Include the amounts, and when and why you take them  Bring the list or the pill bottles to follow-up visits  Carry your medicine list with you in case of an emergency    Follow up with your healthcare provider as directed:  Write down your questions so you remember to ask them during your visits  Prevent CSD:   · Always wash your hands after you handle or pet a cat  · Have your cat treated for fleas  [place] can spread the germ from cat to cat  · Do not allow your cat to lick an open wound on your skin  · Take care when you play with cats to avoid bites or scratches  Avoid rough play  · If you get scratched, licked, or bitten by a cat, wash the affected area with clean water and soap right away  © 2017 [facility] Information is for End User's use only and may not be sold, redistributed or otherwise used for commercial purposes  All illustrations and images included in CareNotes® are the copyrighted property of [employer]  or [employer]  The above information is an  only  It is not intended as medical advice for individual conditions or treatments  Talk to your doctor, nurse or pharmacist before following any medical regimen to see if it is safe and effective for you

## 2020-08-21 NOTE — PROGRESS NOTES
Assessment/Plan:    Cellulitis of left lower leg  Diagnosis 11/20/2020 on virtual visit and started on Keflex  Virtual visit 2 days ago and patient was on his last day of Keflex, and stated that there was no improvement with persistent swelling, erythema, warmth and pain  He denied any systemic symptoms or worsening of the infection  Patient was started on Bactrim for MRSA coverage in the meantime and he has been taking this since yesterday  Upon examination and further history taking patient reports he had a cat attack him for which he was treated with azithromycin back in May 2020 after multiple scratches and bite wounds on his lower limbs  Underlying his current cellulitis there is a scar from the cat wound  He is unsure if the cat bit or scratched in that area  Given that patient has failed azithromycin and Keflex therapy there is concern for anaerobic infection because it could have been a cat bite  Suspicion for a DVT is given the history of the cat attack, however would like to rule out with imaging  With the help of Dr Felicia Gutierrez, educated patient that this is most likely an abscess and needs to have imaging to see the extent of the collection  The only true treatment for an abscess is drainage, and this may mean that patient will subsequently need surgical intervention  Emphasized to patient the importance of getting the imaging tests and completing antibiotics, as well as close follow-up  Patient stated he understood and agreed to go to Imaging across the street today  - Stop Bactrim  - Start Levaquin 750 mg once daily  - Can take Motrin and Tylenol for pain  - STAT Unilateral venous duplex  -STAT MSK ultrasound of left abscess  - Educated patient that he may possibly need surgical intervention to drain the abscess  Diagnoses and all orders for this visit:    Pain and swelling of lower leg, left  -     VAS lower limb venous duplex study, unilateral/limited;  Future  -     US MSK complete; Future    Cat bite, subsequent encounter  -     levofloxacin (LEVAQUIN) 750 mg tablet; Take 1 tablet (750 mg total) by mouth every 24 hours for 5 days    Suspected Covid-19 Virus Infection  Comments:  Cough for 3-4 days  Work is requesting patient to be tested  Orders:  -     PAT Covid Screening; Future    Abscess of lower leg  Comments:  Management per cellulitis above  Subjective:      Patient ID: Nely Lazaro  is a 45 y o  male  This is a very pleasant 79-year-old male who presents today for follow-up of left lower leg cellulitis infection on his 2nd antibiotic medication, as he failed improvement with Keflex  Today he reports no improvement  On examination he had multiple lacerations to his lower limbs that were from a cat attack in May, for which he was treated with azithromycin at the time  Today he reports persistent swelling, erythema, tenderness to the left calf  He was started on Bactrim 1 day prior after a telemedicine visit for this concern for which he had completed management with Keflex for 1 week and had no improvement  He is reported a circular bruise like lesion on the distal portion of the right shin that has been there for 1-2 months  It is mildly tender on palpation  Of note patient has a past medical history of rheumatoid arthritis with multiple joint sequelae that is currently being managed by Rheumatology and in the process of changing his disease modifying medical management  Patient is also requesting covid testing at the request of his employer, as he has had a mild intermittent cough for the past 3-4 days  Currently patient denies any fever, chills, shortness of breath, chest pain, palpitations, light headedness, headaches, vision change, abdominal pain, N/V/D, urinary symptoms       The following portions of the patient's history were reviewed and updated as appropriate: allergies, current medications, past family history, past medical history, past social history, past surgical history and problem list     Review of Systems   Constitutional: Negative for activity change, appetite change, chills, diaphoresis, fatigue and fever  HENT: Negative for congestion, rhinorrhea and sore throat  Eyes: Negative for visual disturbance  Respiratory: Positive for cough ( mild and intermittent for the past 3-4 days  )  Negative for chest tightness, shortness of breath and wheezing  Cardiovascular: Positive for leg swelling (Unilateral calf swelling on the left)  Negative for chest pain and palpitations  Gastrointestinal: Negative for abdominal pain, blood in stool, constipation, diarrhea, nausea and vomiting  Genitourinary: Negative for difficulty urinating, frequency and urgency  Musculoskeletal: Positive for arthralgias ( rheumatoid arthritis) and joint swelling  Negative for back pain, gait problem, myalgias and neck pain  Skin: Positive for color change and wound  Negative for rash  Neurological: Negative for dizziness, seizures, syncope, weakness, light-headedness, numbness and headaches  Psychiatric/Behavioral: Negative for agitation and behavioral problems  Objective:    /90 (BP Location: Right arm, Patient Position: Sitting, Cuff Size: Standard)   Pulse 100   Temp 98 °F (36 7 °C) (Temporal)   Resp 16   Ht 6' (1 829 m)   Wt 85 3 kg (188 lb)   SpO2 96%   BMI 25 50 kg/m²      Physical Exam  Vitals signs and nursing note reviewed  Constitutional:       General: He is not in acute distress  Appearance: Normal appearance  He is well-developed  He is not ill-appearing or diaphoretic  HENT:      Head: Normocephalic and atraumatic  Eyes:      Extraocular Movements: Extraocular movements intact  Pupils: Pupils are equal, round, and reactive to light  Neck:      Musculoskeletal: Normal range of motion and neck supple  Cardiovascular:      Rate and Rhythm: Normal rate and regular rhythm  Pulses: Normal pulses        Heart sounds: Normal heart sounds  No murmur  Pulmonary:      Effort: Pulmonary effort is normal  No respiratory distress  Breath sounds: Normal breath sounds  No wheezing or rales  Chest:      Chest wall: No tenderness  Abdominal:      General: Bowel sounds are normal  There is no distension  Palpations: Abdomen is soft  Tenderness: There is no abdominal tenderness  Musculoskeletal: Normal range of motion  General: Swelling ( focal swelling of the left calf, that is erythematous, warm to touch and there is a scar from a cat scratch or bite underlying the lesion  The area is fluctuant), deformity and signs of injury present  No tenderness  Right lower leg: No edema  Left lower leg: No edema  Comments: Patient also has multiple sequelae of rheumatoid arthritis, with bilateral ulnar deviation and joint swellings at the MCP and PIP joints  He has chronic swelling of the left ankle with tenderness on palpation, no reports of injury in this area  Skin:     General: Skin is warm and dry  Capillary Refill: Capillary refill takes less than 2 seconds  Findings: Lesion ( distal portion of shin just superior to the ankle joint there is a roughly 2 in in diameter purpura like lesion with scaling at the edges  This has been here for 1-2 months ) present  Neurological:      General: No focal deficit present  Mental Status: He is alert and oriented to person, place, and time     Psychiatric:         Mood and Affect: Mood normal          Rory Pollack MD  08/23/20  6:12 PM

## 2020-08-21 NOTE — ASSESSMENT & PLAN NOTE
Diagnosis 11/20/2020 on virtual visit and started on Keflex  Virtual visit 2 days ago and patient was on his last day of Keflex, and stated that there was no improvement with persistent swelling, erythema, warmth and pain  He denied any systemic symptoms or worsening of the infection  Patient was started on Bactrim for MRSA coverage in the meantime and he has been taking this since yesterday  Upon examination and further history taking patient reports he had a cat attack him for which he was treated with azithromycin back in May 2020 after multiple scratches and bite wounds on his lower limbs  Underlying his current cellulitis there is a scar from the cat wound  He is unsure if the cat bit or scratched in that area  Given that patient has failed azithromycin and Keflex therapy there is concern for anaerobic infection because it could have been a cat bite  Suspicion for a DVT is given the history of the cat attack, however would like to rule out with imaging  Educated patient that this is most likely an abscess and needs to have imaging to see the extent of the collection  The only true treatment for an abscess is drainage, and this may mean that patient will subsequently need surgical intervention  Emphasized to patient the importance of getting the imaging tests and completing antibiotics, as well as close follow-up  Patient stated he understood and agreed to go to Imaging across the street today  - Stop Bactrim  - Start Levaquin 750 mg once daily  - Can take Motrin and Tylenol for pain  - STAT Unilateral venous duplex  -STAT MSK ultrasound of left abscess  - Educated patient that he may possibly need surgical intervention to drain the abscess

## 2020-08-23 PROBLEM — L02.419 ABSCESS OF LOWER LEG: Status: ACTIVE | Noted: 2020-08-23

## 2020-08-24 ENCOUNTER — TRANSCRIBE ORDERS (OUTPATIENT)
Dept: ADMINISTRATIVE | Facility: HOSPITAL | Age: 45
End: 2020-08-24

## 2020-08-24 DIAGNOSIS — M79.662 PAIN OF LEFT CALF: Primary | ICD-10-CM

## 2020-08-24 DIAGNOSIS — M79.89 LEG SWELLING: ICD-10-CM

## 2020-08-27 ENCOUNTER — TELEPHONE (OUTPATIENT)
Dept: FAMILY MEDICINE CLINIC | Facility: CLINIC | Age: 45
End: 2020-08-27

## 2020-08-27 NOTE — TELEPHONE ENCOUNTER
Spoke to [facility] [employer] who advised he most likely will not have COVID test done because it was his job that requested it and apparently he no longer needs it   He did state he is going to have ultrasound done on leg on Monday 8/31/2020

## 2020-08-27 NOTE — TELEPHONE ENCOUNTER
----- Message from Josue Arechiga MD sent at 8/27/2020  1:27 PM EDT -----  Regarding: Imaging and tests needed  Please call patient and remind him he has a covid test pending he can go get done  Please also remind him to go get the ultrasound done on his leg  He was suppose to go last Friday immediately after the appointment  Please tell him to go ASAP  Thank you       Josue Arechiga MD  08/27/20  1:28 PM

## 2020-08-31 ENCOUNTER — HOSPITAL ENCOUNTER (OUTPATIENT)
Dept: NON INVASIVE DIAGNOSTICS | Facility: HOSPITAL | Age: 45
Discharge: HOME | End: 2020-08-31
Payer: COMMERCIAL

## 2020-08-31 DIAGNOSIS — M79.89 PAIN AND SWELLING OF LOWER LEG, LEFT: ICD-10-CM

## 2020-08-31 DIAGNOSIS — M79.662 PAIN AND SWELLING OF LOWER LEG, LEFT: ICD-10-CM

## 2020-08-31 PROCEDURE — 93971 EXTREMITY STUDY: CPT

## 2020-08-31 PROCEDURE — 93971 EXTREMITY STUDY: CPT | Performed by: SURGERY

## 2020-09-03 ENCOUNTER — OFFICE VISIT (OUTPATIENT)
Dept: FAMILY MEDICINE CLINIC | Facility: CLINIC | Age: 45
End: 2020-09-03

## 2020-09-03 VITALS
HEIGHT: 72 IN | RESPIRATION RATE: 20 BRPM | HEART RATE: 100 BPM | WEIGHT: 186.8 LBS | OXYGEN SATURATION: 97 % | BODY MASS INDEX: 25.3 KG/M2 | DIASTOLIC BLOOD PRESSURE: 84 MMHG | SYSTOLIC BLOOD PRESSURE: 102 MMHG | TEMPERATURE: 98.4 F

## 2020-09-03 DIAGNOSIS — L03.116 CELLULITIS OF LEFT LOWER EXTREMITY: Primary | ICD-10-CM

## 2020-09-03 PROCEDURE — 99213 OFFICE O/P EST LOW 20 MIN: CPT | Performed by: INTERNAL MEDICINE

## 2020-09-03 PROCEDURE — 87081 CULTURE SCREEN ONLY: CPT | Performed by: FAMILY MEDICINE

## 2020-09-03 RX ORDER — SULFAMETHOXAZOLE AND TRIMETHOPRIM 800; 160 MG/1; MG/1
1 TABLET ORAL EVERY 12 HOURS SCHEDULED
Qty: 20 TABLET | Refills: 0 | Status: SHIPPED | OUTPATIENT
Start: 2020-09-03 | End: 2020-09-13

## 2020-09-03 NOTE — LETTER
September 3, 2020     Patient: Sonido Valdez  YOB: 1975   Date of Visit: 9/3/2020       To Whom it May Concern:    Cassandranicholas Horngiovanni is under my professional care  He was seen in my office on 9/3/2020  Please excuse him from work starting 8/11/2020 until at least 9/10/2020  He will have an appointment for re-evaluation at our office on 9/10/2020  If you have any questions or concerns, please don't hesitate to call      Sincerely,     Hank Kim MD        CC: No Recipients

## 2020-09-03 NOTE — PROGRESS NOTES
Chief Complaint   Patient presents with    Follow-up     abcess on leg--needs letter to return to work     Blood Pressure: 102/84, Pulse: 100, Respirations: 20, Temperature: 98 4 °F (36 9 °C), Temp Source: Temporal, SpO2: 97 %, Weight - Scale: 84 7 kg (186 lb 12 8 oz), Height: 6' (182 9 cm), Pain Score: 0-No pain    Assessment/Plan  1  Cellulitis  Trial of Bactrim for consideration of MRSA  No fluid collection on exam today that would benefit from I&D  Nares culture to screen for MRSA  RTO 1 week for re-evaluation  Discussed ER precautions like spreading erythema and swelling and he verbalized understanding of this  Differential diagnosis includes RA medication side effect and asked patient to discuss it with Dr Jeanette Corona at his follow-up on 9/8 (Tuesday)  2  Letter provided from 8/11 to 9/10 and handed to patient  To be re-evaluated on 9/10  The above was discussed in layman's terms, the patient was engaged using the shared-decision making process, verbalized understanding of the treatment plan, and all questions were answered to the patient's satisfaction  Diagnoses and all orders for this visit:    Cellulitis of left lower extremity  -     sulfamethoxazole-trimethoprim (BACTRIM DS) 800-160 mg per tablet; Take 1 tablet by mouth every 12 (twelve) hours for 10 days  -     MRSA culture; Future  -     MRSA culture          Subjective/HPI  1  Patient is not well known to me  PMHx of RA  2  Follow-up of ?abscess with cellulitis on LLE since 8/11/2020  Has tried Keflex, which was not completed due to transition to Levaquin, without resolution  He also had limited venous duplex which was negative for DVT  Review of Systems  Constitutional: Negative for activity change, appetite change, chills and fever  Respiratory: Negative for shortness of breath  Cardiovascular: Negative for chest pain  Gastrointestinal: Negative for blood in stool, nausea and vomiting     Genitourinary: Negative for difficulty urinating and dysuria  Pertinent items are noted in chief complaint and HPI  Social History   reports that he quit smoking about 7 years ago  He has never used smokeless tobacco     reports current alcohol use  reports current drug use  Drug: Marijuana  Objective  Physical Exam  Vitals signs reviewed  Constitutional:       Appearance: Normal appearance  He is well-developed  Cardiovascular:      Heart sounds: Normal heart sounds  Pulmonary:      Effort: Pulmonary effort is normal       Breath sounds: Normal breath sounds  Neurological:      Mental Status: He is oriented to person, place, and time    -Lower extremities with scattered scars from previous cat attack  Left ankle diameter slightly larger than right ankle  -Posterior aspect of left calf with 5 cm dry erythematous patch with surrounding erythema spreading 5 cm superiorly and tapering down to the Achilles  No drainage or palpable fluid collection  Area is non-blanching and firm    -Right ankle with ?3 cm nodule that has erythematous overlying skin  This note has been dictated using [email or web address] software  It may contain errors, including improperly dictated words  Please contact physician directly for any questions         Chart Review/Health Maintenance   The following have been updated: none    No Known Allergies  Current Outpatient Medications:     acetaminophen (TYLENOL) 650 mg CR tablet, Take 1 tablet (650 mg total) by mouth every 8 (eight) hours as needed for mild pain, Disp: 30 tablet, Rfl: 3    JANUVIA 100 MG tablet, take 1 tablet by mouth once daily, Disp: 90 tablet, Rfl: 0    meloxicam (MOBIC) 15 mg tablet, take 1 tablet by mouth once daily, Disp: 30 tablet, Rfl: 2    metFORMIN (GLUCOPHAGE) 1000 MG tablet, Take 1 tablet (1,000 mg total) by mouth 2 (two) times a day with meals, Disp: 180 tablet, Rfl: 1    QUEtiapine (SEROQUEL) 200 mg tablet, daily as needed , Disp: , Rfl:     QUEtiapine (SEROquel) 300 mg tablet, Take by mouth daily as needed, Disp: , Rfl:     ACCU-CHEK SOFTCLIX LANCETS lancets, TEST once daily BEFORE BREAKFAST, Disp: , Rfl:     Adalimumab 40 MG/0 8ML PNKT, Inject 40 mg under the skin, Disp: , Rfl:     Alcohol Swabs (ALCOHOL PADS) 70 % PADS, use as directed once daily, Disp: , Rfl:     ALPRAZolam (XANAX) 2 MG tablet, 3 (three) times a day, Disp: , Rfl:     amoxicillin (AMOXIL) 500 mg capsule, , Disp: , Rfl:     fluticasone (FLONASE) 50 mcg/act nasal spray, 1 spray into each nostril daily (Patient not taking: Reported on 9/3/2020), Disp: 1 Bottle, Rfl: 2    glucose blood test strip, test once daily before breakfast, Disp: , Rfl:     HUMIRA PEN 40 MG/0 4ML PNKT, , Disp: , Rfl:     hydrOXYzine HCL (ATARAX) 25 mg tablet, , Disp: , Rfl:     hydrOXYzine HCL (ATARAX) 50 mg tablet, , Disp: , Rfl:     hydrOXYzine pamoate (VISTARIL) 50 mg capsule, , Disp: , Rfl:     ibuprofen (MOTRIN) 600 mg tablet, , Disp: , Rfl:     leflunomide (ARAVA) 20 MG tablet, Take 20 mg by mouth daily, Disp: , Rfl: 0    montelukast (SINGULAIR) 10 mg tablet, Take 1 tablet (10 mg total) by mouth daily at bedtime (Patient not taking: Reported on 9/3/2020), Disp: 30 tablet, Rfl: 1    omeprazole (PriLOSEC) 20 mg delayed release capsule, Take 1 capsule (20 mg total) by mouth daily (Patient not taking: Reported on 9/3/2020), Disp: 30 capsule, Rfl: 1    propranolol (INDERAL) 10 mg tablet, , Disp: , Rfl:     sulfamethoxazole-trimethoprim (BACTRIM DS) 800-160 mg per tablet, Take 1 tablet by mouth every 12 (twelve) hours for 10 days, Disp: 20 tablet, Rfl: 0    Tofacitinib Citrate ER (Xeljanz XR) 11 MG TB24, Take 11 mg by mouth daily, Disp: , Rfl:     Patient Active Problem List   Diagnosis    Rheumatoid arthritis ([facility] )    Type 2 diabetes mellitus ([address] )    Foot pain, bilateral    GERD (gastroesophageal reflux disease)    Overweight    Venous stasis dermatitis of left lower extremity    Cellulitis of left lower leg    Abscess of lower leg     No past surgical history on file  No family history on file    Health Maintenance   Topic Date Due    HIV Screening  11/20/1992    Pneumococcal Vaccine: Pediatrics (0 to 5 Years) and At-Risk Patients (6 to 59 Years) (3 of 3 - PPSV23) 05/20/2019    BMI: Followup Plan  04/12/2020    Diabetic Foot Exam  04/15/2020    Influenza Vaccine  07/01/2020    DM Eye Exam  08/29/2020    Depression Screening PHQ  11/06/2020    Medicare Annual Wellness Visit (AWV)  11/06/2020    HEMOGLOBIN A1C  12/12/2020    URINE MICROALBUMIN  06/12/2021    BMI: Adult  09/03/2021    DTaP,Tdap,and Td Vaccines (2 - Td) 03/28/2024    HIB Vaccine  Aged Out    Hepatitis B Vaccine  Aged Out    IPV Vaccine  Aged Out    Hepatitis A Vaccine  Aged Out    Meningococcal ACWY Vaccine  Aged Out    HPV Vaccine  Aged Out

## 2020-09-03 NOTE — LETTER
September 3, 2020     Patient: Karen Cheatham  YOB: 1975   Date of Visit: 9/3/2020       To Whom it May Concern:    Vero Vides is under my professional care  He was seen in my office on 9/3/2020  Please excuse him from work starting 8/21/2020 until at least 9/10/2020     9/10/2020 he will have an appointment for re-evaluation at our office for re-evaluation  If you have any questions or concerns, please don't hesitate to call         Sincerely,     Bill Pham MD        CC: No Recipients

## 2020-09-05 LAB — MRSA NOSE QL CULT: NORMAL

## 2020-09-10 ENCOUNTER — OFFICE VISIT (OUTPATIENT)
Dept: FAMILY MEDICINE CLINIC | Facility: CLINIC | Age: 45
End: 2020-09-10

## 2020-09-10 VITALS
RESPIRATION RATE: 18 BRPM | SYSTOLIC BLOOD PRESSURE: 118 MMHG | HEIGHT: 72 IN | DIASTOLIC BLOOD PRESSURE: 80 MMHG | HEART RATE: 106 BPM | OXYGEN SATURATION: 98 % | TEMPERATURE: 97.6 F | WEIGHT: 188 LBS | BODY MASS INDEX: 25.47 KG/M2

## 2020-09-10 DIAGNOSIS — L03.116 CELLULITIS OF LEFT LOWER LEG: Primary | ICD-10-CM

## 2020-09-10 PROCEDURE — 99213 OFFICE O/P EST LOW 20 MIN: CPT | Performed by: FAMILY MEDICINE

## 2020-09-10 NOTE — ASSESSMENT & PLAN NOTE
Appears to be resolving  Mild erythema, no streaking, purulence or significant warmth noted      Prolonged healing likely secondary to RA and DM  Ultrasound negative for DVT, MRSA cultures negative  Continue bactrim  Encouraged elevation of extremities when possible  Return to work note given  Will reassess this coming week

## 2020-09-10 NOTE — PROGRESS NOTES
Assessment/Plan:    Cellulitis of left lower leg  Appears to be resolving  Mild erythema, no streaking, purulence or significant warmth noted  Prolonged healing likely secondary to RA and DM  Ultrasound negative for DVT, MRSA cultures negative  Continue bactrim  Encouraged elevation of extremities when possible  Return to work note given  Will reassess this coming week       Diagnoses and all orders for this visit:    Cellulitis of left lower leg          Subjective:      Patient ID: Lavell Rosario  is a 45 y o  male  HPI      his right pleasant 65-year-old male presents to clinic for follow-up lower extremity cellulitis  States he has been compliant with his medications and has been elevating his legs as directed with warm compresses  Has not returned to work due to above  Today states he is feeling well denies having any fevers chills or recent trauma, feels  Better overall  The following portions of the patient's history were reviewed and updated as appropriate: allergies, current medications, past family history, past medical history, past social history, past surgical history and problem list     Review of Systems   Constitutional: Negative for activity change, appetite change, chills, diaphoresis, fatigue and fever  HENT: Negative for congestion, ear pain, hearing loss, postnasal drip, rhinorrhea, sneezing, sore throat and tinnitus  Eyes: Negative for pain  Respiratory: Negative for apnea, cough, choking, chest tightness, shortness of breath and wheezing  Cardiovascular: Negative for chest pain, palpitations and leg swelling  Gastrointestinal: Negative for abdominal distention, abdominal pain, constipation, diarrhea, nausea and vomiting  Genitourinary: Negative for decreased urine volume and dysuria  Musculoskeletal: Negative for back pain  Skin: Negative for rash  Neurological: Negative for dizziness, tremors and syncope  Hematological: Positive for adenopathy  Bruises/bleeds easily  Psychiatric/Behavioral: Negative for agitation and suicidal ideas  Objective:      /80 (BP Location: Left arm, Patient Position: Sitting, Cuff Size: Standard)   Pulse (!) 106   Temp 97 6 °F (36 4 °C) (Temporal)   Resp 18   Ht 6' (1 829 m)   Wt 85 3 kg (188 lb)   SpO2 98%   BMI 25 50 kg/m²          Physical Exam  Constitutional:       General: He is not in acute distress  Appearance: He is well-developed  He is not diaphoretic  HENT:      Head: Normocephalic and atraumatic  Right Ear: External ear normal       Left Ear: External ear normal       Mouth/Throat:      Pharynx: No oropharyngeal exudate  Eyes:      General: No scleral icterus  Conjunctiva/sclera: Conjunctivae normal       Pupils: Pupils are equal, round, and reactive to light  Neck:      Musculoskeletal: Normal range of motion and neck supple  Thyroid: No thyromegaly  Trachea: No tracheal deviation  Cardiovascular:      Rate and Rhythm: Normal rate and regular rhythm  Heart sounds: Normal heart sounds  No murmur  Pulmonary:      Effort: Pulmonary effort is normal  No respiratory distress  Breath sounds: Normal breath sounds  No wheezing  Abdominal:      General: Bowel sounds are normal  There is no distension  Palpations: Abdomen is soft  Tenderness: There is no rebound  Musculoskeletal: Normal range of motion  Skin:     General: Skin is warm  Comments: Multiple rheumatoid nodules throughout   left lower extremity posterior discoloration noted, no purulence or streaking  right lateral lower extremity with ecchymosis   Neurological:      Mental Status: He is alert and oriented to person, place, and time

## 2020-09-10 NOTE — LETTER
September 10, 2020     Patient: Yaakov Arreaga  YOB: 1975   Date of Visit: 9/10/2020       To Whom it May Concern:    Klaudia Jolley is under my professional care  He was seen in my office on 9/10/2020  He may return to work on 9/14/2020 with no restrictions, he has been out of work due to a medical condition which required time due to prolonged healing, issue has resolved  If you have any questions or concerns, please don't hesitate to call           Sincerely,        Karthik Antonio MD

## 2020-10-01 ENCOUNTER — OFFICE VISIT (OUTPATIENT)
Dept: FAMILY MEDICINE CLINIC | Facility: CLINIC | Age: 45
End: 2020-10-01

## 2020-10-01 VITALS
HEIGHT: 72 IN | SYSTOLIC BLOOD PRESSURE: 130 MMHG | OXYGEN SATURATION: 97 % | TEMPERATURE: 96 F | WEIGHT: 184 LBS | BODY MASS INDEX: 24.92 KG/M2 | HEART RATE: 96 BPM | DIASTOLIC BLOOD PRESSURE: 80 MMHG | RESPIRATION RATE: 16 BRPM

## 2020-10-01 DIAGNOSIS — L03.116 CELLULITIS OF LEFT LOWER LEG: ICD-10-CM

## 2020-10-01 DIAGNOSIS — E66.3 OVERWEIGHT: ICD-10-CM

## 2020-10-01 DIAGNOSIS — E11.9 TYPE 2 DIABETES MELLITUS WITHOUT COMPLICATION, WITHOUT LONG-TERM CURRENT USE OF INSULIN (HCC): Primary | ICD-10-CM

## 2020-10-01 DIAGNOSIS — M06.9 RHEUMATOID ARTHRITIS, INVOLVING UNSPECIFIED SITE, UNSPECIFIED WHETHER RHEUMATOID FACTOR PRESENT (HCC): ICD-10-CM

## 2020-10-01 PROBLEM — L02.419 ABSCESS OF LOWER LEG: Status: RESOLVED | Noted: 2020-08-23 | Resolved: 2020-10-01

## 2020-10-01 LAB — SL AMB POCT HEMOGLOBIN AIC: 8.3 (ref ?–6.5)

## 2020-10-01 PROCEDURE — 1036F TOBACCO NON-USER: CPT | Performed by: FAMILY MEDICINE

## 2020-10-01 PROCEDURE — 83036 HEMOGLOBIN GLYCOSYLATED A1C: CPT | Performed by: FAMILY MEDICINE

## 2020-10-01 PROCEDURE — 99214 OFFICE O/P EST MOD 30 MIN: CPT | Performed by: FAMILY MEDICINE

## 2020-10-01 PROCEDURE — 3052F HG A1C>EQUAL 8.0%<EQUAL 9.0%: CPT | Performed by: FAMILY MEDICINE

## 2020-10-01 PROCEDURE — 3008F BODY MASS INDEX DOCD: CPT | Performed by: FAMILY MEDICINE

## 2020-10-18 DIAGNOSIS — E11.8 TYPE 2 DIABETES MELLITUS WITH COMPLICATION, WITHOUT LONG-TERM CURRENT USE OF INSULIN (HCC): ICD-10-CM

## 2020-10-31 DIAGNOSIS — E11.9 TYPE 2 DIABETES MELLITUS WITHOUT COMPLICATION, WITHOUT LONG-TERM CURRENT USE OF INSULIN (HCC): ICD-10-CM

## 2020-11-02 RX ORDER — SITAGLIPTIN 100 MG/1
TABLET, FILM COATED ORAL
Qty: 90 TABLET | Refills: 0 | Status: SHIPPED | OUTPATIENT
Start: 2020-11-02 | End: 2021-01-25

## 2021-01-05 ENCOUNTER — LAB (OUTPATIENT)
Dept: LAB | Facility: HOSPITAL | Age: 46
End: 2021-01-05
Payer: COMMERCIAL

## 2021-01-05 ENCOUNTER — TRANSCRIBE ORDERS (OUTPATIENT)
Dept: LAB | Facility: HOSPITAL | Age: 46
End: 2021-01-05

## 2021-01-05 DIAGNOSIS — M05.79 RHEUMATOID ARTHRITIS INVOLVING MULTIPLE SITES WITH POSITIVE RHEUMATOID FACTOR (HCC): ICD-10-CM

## 2021-01-05 DIAGNOSIS — Z79.1 ENCOUNTER FOR MONITORING NSAID THERAPY: ICD-10-CM

## 2021-01-05 DIAGNOSIS — Z51.81 ENCOUNTER FOR MONITORING NSAID THERAPY: ICD-10-CM

## 2021-01-05 DIAGNOSIS — E78.2 MIXED HYPERLIPIDEMIA: ICD-10-CM

## 2021-01-05 DIAGNOSIS — Z79.899 ENCOUNTER FOR MONITORING LEFLUNOMIDE THERAPY: Primary | ICD-10-CM

## 2021-01-05 DIAGNOSIS — Z51.81 ENCOUNTER FOR MONITORING LEFLUNOMIDE THERAPY: Primary | ICD-10-CM

## 2021-01-05 LAB
ALBUMIN SERPL BCP-MCNC: 3.8 G/DL (ref 3–5.2)
ALP SERPL-CCNC: 77 U/L (ref 43–122)
ALT SERPL W P-5'-P-CCNC: 22 U/L (ref 9–52)
ANION GAP SERPL CALCULATED.3IONS-SCNC: 7 MMOL/L (ref 5–14)
AST SERPL W P-5'-P-CCNC: 26 U/L (ref 17–59)
BACTERIA UR QL AUTO: ABNORMAL /HPF
BASOPHILS # BLD AUTO: 0 THOUSANDS/ΜL (ref 0–0.1)
BASOPHILS NFR BLD AUTO: 1 % (ref 0–1)
BILIRUB SERPL-MCNC: 0.5 MG/DL
BILIRUB UR QL STRIP: NEGATIVE
BUN SERPL-MCNC: 9 MG/DL (ref 5–25)
C3 SERPL-MCNC: 144 MG/DL (ref 90–180)
C4 SERPL-MCNC: 33 MG/DL (ref 10–40)
CALCIUM SERPL-MCNC: 9.3 MG/DL (ref 8.4–10.2)
CHLORIDE SERPL-SCNC: 105 MMOL/L (ref 97–108)
CHOLEST SERPL-MCNC: 195 MG/DL
CK SERPL-CCNC: 132 U/L (ref 55–170)
CLARITY UR: CLEAR
CO2 SERPL-SCNC: 28 MMOL/L (ref 22–30)
COLOR UR: YELLOW
CREAT SERPL-MCNC: 0.56 MG/DL (ref 0.7–1.5)
CRP SERPL QL: <5 MG/L
EOSINOPHIL # BLD AUTO: 0.1 THOUSAND/ΜL (ref 0–0.4)
EOSINOPHIL NFR BLD AUTO: 2 % (ref 0–6)
ERYTHROCYTE [DISTWIDTH] IN BLOOD BY AUTOMATED COUNT: 13.2 %
ERYTHROCYTE [SEDIMENTATION RATE] IN BLOOD: 25 MM/HOUR (ref 0–14)
GFR SERPL CREATININE-BSD FRML MDRD: 127 ML/MIN/1.73SQ M
GLUCOSE SERPL-MCNC: 229 MG/DL (ref 70–99)
GLUCOSE UR STRIP-MCNC: ABNORMAL MG/DL
HCT VFR BLD AUTO: 42.7 % (ref 41–53)
HDLC SERPL-MCNC: 32 MG/DL
HGB BLD-MCNC: 13.8 G/DL (ref 13.5–17.5)
HGB UR QL STRIP.AUTO: NEGATIVE
KETONES UR STRIP-MCNC: NEGATIVE MG/DL
LDLC SERPL CALC-MCNC: 118 MG/DL
LEUKOCYTE ESTERASE UR QL STRIP: NEGATIVE
LYMPHOCYTES # BLD AUTO: 2.3 THOUSANDS/ΜL (ref 0.5–4)
LYMPHOCYTES NFR BLD AUTO: 29 % (ref 25–45)
MCH RBC QN AUTO: 31.5 PG (ref 26–34)
MCHC RBC AUTO-ENTMCNC: 32.3 G/DL (ref 31–36)
MCV RBC AUTO: 98 FL (ref 80–100)
MONOCYTES # BLD AUTO: 0.6 THOUSAND/ΜL (ref 0.2–0.9)
MONOCYTES NFR BLD AUTO: 7 % (ref 1–10)
NEUTROPHILS # BLD AUTO: 4.8 THOUSANDS/ΜL (ref 1.8–7.8)
NEUTS SEG NFR BLD AUTO: 62 % (ref 45–65)
NITRITE UR QL STRIP: NEGATIVE
NON-SQ EPI CELLS URNS QL MICRO: ABNORMAL /HPF
NONHDLC SERPL-MCNC: 163 MG/DL
PH UR STRIP.AUTO: 5 [PH]
PLATELET # BLD AUTO: 326 THOUSANDS/UL (ref 150–450)
PMV BLD AUTO: 9.7 FL (ref 8.9–12.7)
POTASSIUM SERPL-SCNC: 4 MMOL/L (ref 3.6–5)
PROT SERPL-MCNC: 6.6 G/DL (ref 5.9–8.4)
PROT UR STRIP-MCNC: NEGATIVE MG/DL
RBC # BLD AUTO: 4.37 MILLION/UL (ref 4.5–5.9)
RBC #/AREA URNS AUTO: ABNORMAL /HPF
SODIUM SERPL-SCNC: 140 MMOL/L (ref 137–147)
SP GR UR STRIP.AUTO: 1.02 (ref 1–1.04)
TRIGL SERPL-MCNC: 227 MG/DL
UROBILINOGEN UA: NEGATIVE MG/DL
WBC # BLD AUTO: 7.8 THOUSAND/UL (ref 4.5–11)
WBC #/AREA URNS AUTO: ABNORMAL /HPF

## 2021-01-05 PROCEDURE — 36415 COLL VENOUS BLD VENIPUNCTURE: CPT

## 2021-01-05 PROCEDURE — 86200 CCP ANTIBODY: CPT

## 2021-01-05 PROCEDURE — 86160 COMPLEMENT ANTIGEN: CPT

## 2021-01-05 PROCEDURE — 86140 C-REACTIVE PROTEIN: CPT

## 2021-01-05 PROCEDURE — 85025 COMPLETE CBC W/AUTO DIFF WBC: CPT

## 2021-01-05 PROCEDURE — 80053 COMPREHEN METABOLIC PANEL: CPT

## 2021-01-05 PROCEDURE — 85652 RBC SED RATE AUTOMATED: CPT

## 2021-01-05 PROCEDURE — 80061 LIPID PANEL: CPT

## 2021-01-05 PROCEDURE — 86430 RHEUMATOID FACTOR TEST QUAL: CPT

## 2021-01-05 PROCEDURE — 86038 ANTINUCLEAR ANTIBODIES: CPT

## 2021-01-05 PROCEDURE — 81001 URINALYSIS AUTO W/SCOPE: CPT

## 2021-01-05 PROCEDURE — 86431 RHEUMATOID FACTOR QUANT: CPT

## 2021-01-05 PROCEDURE — 82550 ASSAY OF CK (CPK): CPT

## 2021-01-06 LAB
CRYOGLOB RF SER-ACNC: ABNORMAL [IU]/ML
RHEUMATOID FACT SER QL LA: POSITIVE
RYE IGE QN: NEGATIVE

## 2021-01-07 LAB — CCP IGA+IGG SERPL IA-ACNC: >250 UNITS (ref 0–19)

## 2021-01-24 DIAGNOSIS — E11.9 TYPE 2 DIABETES MELLITUS WITHOUT COMPLICATION, WITHOUT LONG-TERM CURRENT USE OF INSULIN (HCC): ICD-10-CM

## 2021-01-25 RX ORDER — SITAGLIPTIN 100 MG/1
TABLET, FILM COATED ORAL
Qty: 90 TABLET | Refills: 0 | Status: SHIPPED | OUTPATIENT
Start: 2021-01-25 | End: 2021-05-19

## 2021-04-16 ENCOUNTER — TRANSCRIBE ORDERS (OUTPATIENT)
Dept: LAB | Facility: HOSPITAL | Age: 46
End: 2021-04-16

## 2021-04-16 ENCOUNTER — APPOINTMENT (OUTPATIENT)
Dept: LAB | Facility: HOSPITAL | Age: 46
End: 2021-04-16
Payer: COMMERCIAL

## 2021-04-16 DIAGNOSIS — Z79.899 ENCOUNTER FOR MONITORING LEFLUNOMIDE THERAPY: Primary | ICD-10-CM

## 2021-04-16 DIAGNOSIS — Z51.81 ENCOUNTER FOR MONITORING LEFLUNOMIDE THERAPY: Primary | ICD-10-CM

## 2021-04-16 LAB
ALBUMIN SERPL BCP-MCNC: 3.9 G/DL (ref 3–5.2)
ALT SERPL W P-5'-P-CCNC: 24 U/L
AST SERPL W P-5'-P-CCNC: 21 U/L (ref 17–59)
BASOPHILS # BLD AUTO: 0.1 THOUSANDS/ΜL (ref 0–0.1)
BASOPHILS NFR BLD AUTO: 1 % (ref 0–1)
CREAT SERPL-MCNC: 0.73 MG/DL (ref 0.7–1.5)
EOSINOPHIL # BLD AUTO: 0.1 THOUSAND/ΜL (ref 0–0.4)
EOSINOPHIL NFR BLD AUTO: 2 % (ref 0–6)
ERYTHROCYTE [DISTWIDTH] IN BLOOD BY AUTOMATED COUNT: 14 %
ERYTHROCYTE [SEDIMENTATION RATE] IN BLOOD: 28 MM/HOUR (ref 0–14)
GFR SERPL CREATININE-BSD FRML MDRD: 114 ML/MIN/1.73SQ M
HCT VFR BLD AUTO: 38.8 % (ref 41–53)
HGB BLD-MCNC: 12.7 G/DL (ref 13.5–17.5)
LYMPHOCYTES # BLD AUTO: 1 THOUSANDS/ΜL (ref 0.5–4)
LYMPHOCYTES NFR BLD AUTO: 20 % (ref 25–45)
MCH RBC QN AUTO: 32.7 PG (ref 26–34)
MCHC RBC AUTO-ENTMCNC: 32.8 G/DL (ref 31–36)
MCV RBC AUTO: 100 FL (ref 80–100)
MONOCYTES # BLD AUTO: 0.4 THOUSAND/ΜL (ref 0.2–0.9)
MONOCYTES NFR BLD AUTO: 8 % (ref 1–10)
NEUTROPHILS # BLD AUTO: 3.3 THOUSANDS/ΜL (ref 1.8–7.8)
NEUTS SEG NFR BLD AUTO: 69 % (ref 45–65)
PLATELET # BLD AUTO: 271 THOUSANDS/UL (ref 150–450)
PMV BLD AUTO: 9.4 FL (ref 8.9–12.7)
RBC # BLD AUTO: 3.89 MILLION/UL (ref 4.5–5.9)
WBC # BLD AUTO: 4.9 THOUSAND/UL (ref 4.5–11)

## 2021-04-16 PROCEDURE — 82040 ASSAY OF SERUM ALBUMIN: CPT

## 2021-04-16 PROCEDURE — 85652 RBC SED RATE AUTOMATED: CPT

## 2021-04-16 PROCEDURE — 82565 ASSAY OF CREATININE: CPT

## 2021-04-16 PROCEDURE — 36415 COLL VENOUS BLD VENIPUNCTURE: CPT

## 2021-04-16 PROCEDURE — 85025 COMPLETE CBC W/AUTO DIFF WBC: CPT

## 2021-04-16 PROCEDURE — 84460 ALANINE AMINO (ALT) (SGPT): CPT

## 2021-04-16 PROCEDURE — 84450 TRANSFERASE (AST) (SGOT): CPT

## 2021-05-17 DIAGNOSIS — E11.8 TYPE 2 DIABETES MELLITUS WITH COMPLICATION, WITHOUT LONG-TERM CURRENT USE OF INSULIN (HCC): ICD-10-CM

## 2021-05-18 DIAGNOSIS — E11.9 TYPE 2 DIABETES MELLITUS WITHOUT COMPLICATION, WITHOUT LONG-TERM CURRENT USE OF INSULIN (HCC): ICD-10-CM

## 2021-05-19 RX ORDER — SITAGLIPTIN 100 MG/1
TABLET, FILM COATED ORAL
Qty: 90 TABLET | Refills: 0 | Status: SHIPPED | OUTPATIENT
Start: 2021-05-19 | End: 2021-05-28 | Stop reason: SDUPTHER

## 2021-05-28 ENCOUNTER — TELEPHONE (OUTPATIENT)
Dept: FAMILY MEDICINE CLINIC | Facility: CLINIC | Age: 46
End: 2021-05-28

## 2021-05-28 DIAGNOSIS — E11.9 TYPE 2 DIABETES MELLITUS WITHOUT COMPLICATION, WITHOUT LONG-TERM CURRENT USE OF INSULIN (HCC): ICD-10-CM

## 2021-05-28 DIAGNOSIS — E11.8 TYPE 2 DIABETES MELLITUS WITH COMPLICATION, WITHOUT LONG-TERM CURRENT USE OF INSULIN (HCC): ICD-10-CM

## 2021-05-28 NOTE — TELEPHONE ENCOUNTER
Pt called requesting refills when I tried to pass him to the refill line he then requested to speak to his provider and didn't want to be transferred, please call pt at [phone] he has question regarding blood work

## 2021-06-03 ENCOUNTER — OFFICE VISIT (OUTPATIENT)
Dept: FAMILY MEDICINE CLINIC | Facility: CLINIC | Age: 46
End: 2021-06-03

## 2021-06-03 VITALS
BODY MASS INDEX: 24.45 KG/M2 | SYSTOLIC BLOOD PRESSURE: 116 MMHG | TEMPERATURE: 97.3 F | RESPIRATION RATE: 18 BRPM | HEART RATE: 106 BPM | OXYGEN SATURATION: 95 % | DIASTOLIC BLOOD PRESSURE: 70 MMHG | HEIGHT: 72 IN | WEIGHT: 180.5 LBS

## 2021-06-03 DIAGNOSIS — Z00.00 MEDICARE ANNUAL WELLNESS VISIT, INITIAL: Primary | ICD-10-CM

## 2021-06-03 DIAGNOSIS — Z23 NEED FOR VACCINATION: ICD-10-CM

## 2021-06-03 DIAGNOSIS — Z11.4 ENCOUNTER FOR SCREENING FOR HIV: ICD-10-CM

## 2021-06-03 DIAGNOSIS — E11.9 TYPE 2 DIABETES MELLITUS WITHOUT COMPLICATION, WITHOUT LONG-TERM CURRENT USE OF INSULIN (HCC): ICD-10-CM

## 2021-06-03 PROCEDURE — 1036F TOBACCO NON-USER: CPT | Performed by: FAMILY MEDICINE

## 2021-06-03 PROCEDURE — G0439 PPPS, SUBSEQ VISIT: HCPCS | Performed by: FAMILY MEDICINE

## 2021-06-03 PROCEDURE — 99213 OFFICE O/P EST LOW 20 MIN: CPT | Performed by: FAMILY MEDICINE

## 2021-06-03 PROCEDURE — 3725F SCREEN DEPRESSION PERFORMED: CPT | Performed by: FAMILY MEDICINE

## 2021-06-03 NOTE — ASSESSMENT & PLAN NOTE
Lab Results   Component Value Date    HGBA1C 8 3 (A) 10/01/2020     - Currently managed with metformin and Januvia   - Will repeat hemoglobin A1C and repeat urine microalbumin/creatinine ratio   - Pneumovax 23 ordered however patient left before it could be administered; defer to next office visit   - Diabetic foot exam and eye exam performed at today's office visit

## 2021-06-03 NOTE — PATIENT INSTRUCTIONS
Medicare Preventive Visit Patient Instructions  Thank you for completing your Welcome to Medicare Visit or Medicare Annual Wellness Visit today  Your next wellness visit will be due in one year (6/4/2022)  The screening/preventive services that you may require over the next 5-10 years are detailed below  Some tests may not apply to you based off risk factors and/or age  Screening tests ordered at today's visit but not completed yet may show as past due  Also, please note that scanned in results may not display below  Preventive Screenings:  Service Recommendations Previous Testing/Comments   Colorectal Cancer Screening  · Colonoscopy    · Fecal Occult Blood Test (FOBT)/Fecal Immunochemical Test (FIT)  · Fecal DNA/Cologuard Test  · Flexible Sigmoidoscopy Age: 54-65 years old   Colonoscopy: every 10 years (May be performed more frequently if at higher risk)  OR  FOBT/FIT: every 1 year  OR  Cologuard: every 3 years  OR  Sigmoidoscopy: every 5 years  Screening may be recommended earlier than age 48 if at higher risk for colorectal cancer  Also, an individualized decision between you and your healthcare provider will decide whether screening between the ages of 74-80 would be appropriate   Colonoscopy: Not on file  FOBT/FIT: Not on file  Cologuard: Not on file  Sigmoidoscopy: Not on file          Prostate Cancer Screening Individualized decision between patient and health care provider in men between ages of 53-78   Medicare will cover every 12 months beginning on the day after your 50th birthday PSA: No results in last 5 years     Screening Not Indicated     Hepatitis C Screening Once for adults born between [facility]  More frequently in patients at high risk for Hepatitis C Hep C Antibody: Not on file        Diabetes Screening 1-2 times per year if you're at risk for diabetes or have pre-diabetes Fasting glucose: 205 mg/dL   A1C: 8 3    Screening Not Indicated  History Diabetes   Cholesterol Screening Once every 5 years if you don't have a lipid disorder  May order more often based on risk factors  Lipid panel: 01/05/2021    Screening Not Indicated  History Lipid Disorder      Other Preventive Screenings Covered by Medicare:  1  Abdominal Aortic Aneurysm (AAA) Screening: covered once if your at risk  You're considered to be at risk if you have a family history of AAA or a male between the age of 73-68 who smoking at least 100 cigarettes in your lifetime  2  Lung Cancer Screening: covers low dose CT scan once per year if you meet all of the following conditions: (1) Age 50-69; (2) No signs or symptoms of lung cancer; (3) Current smoker or have quit smoking within the last 15 years; (4) You have a tobacco smoking history of at least 30 pack years (packs per day x number of years you smoked); (5) You get a written order from a healthcare provider  3  Glaucoma Screening: covered annually if you're considered high risk: (1) You have diabetes OR (2) Family history of glaucoma OR (3)  aged 48 and older OR (3)  American aged 72 and older  3  Osteoporosis Screening: covered every 2 years if you meet one of the following conditions: (1) Have a vertebral abnormality; (2) On glucocorticoid therapy for more than 3 months; (3) Have primary hyperparathyroidism; (4) On osteoporosis medications and need to assess response to drug therapy  5  HIV Screening: covered annually if you're between the age of 12-76  Also covered annually if you are younger than 13 and older than 72 with risk factors for HIV infection  For pregnant patients, it is covered up to 3 times per pregnancy      Immunizations:  Immunization Recommendations   Influenza Vaccine Annual influenza vaccination during flu season is recommended for all persons aged >= 6 months who do not have contraindications   Pneumococcal Vaccine (Prevnar and Pneumovax)  * Prevnar = PCV13  * Pneumovax = PPSV23 Adults 25-60 years old: 1-3 doses may be recommended based on certain risk factors  Adults 72 years old: Prevnar (PCV13) vaccine recommended followed by Pneumovax (PPSV23) vaccine  If already received PPSV23 since turning 65, then PCV13 recommended at least one year after PPSV23 dose  Hepatitis B Vaccine 3 dose series if at intermediate or high risk (ex: diabetes, end stage renal disease, liver disease)   Tetanus (Td) Vaccine - COST NOT COVERED BY MEDICARE PART B Following completion of primary series, a booster dose should be given every 10 years to maintain immunity against tetanus  Td may also be given as tetanus wound prophylaxis  Tdap Vaccine - COST NOT COVERED BY MEDICARE PART B Recommended at least once for all adults  For pregnant patients, recommended with each pregnancy  Shingles Vaccine (Shingrix) - COST NOT COVERED BY MEDICARE PART B  2 shot series recommended in those aged 48 and above     Health Maintenance Due:      Topic Date Due    HIV Screening  Never done     Immunizations Due:      Topic Date Due    COVID-19 Vaccine (1) Never done    Pneumococcal Vaccine: Pediatrics (0 to 5 Years) and At-Risk Patients (6 to 59 Years) (3 of 3 - PPSV23) 05/20/2019    Influenza Vaccine (Season Ended) 09/01/2021     Advance Directives   What are advance directives? Advance directives are legal documents that state your wishes and plans for medical care  These plans are made ahead of time in case you lose your ability to make decisions for yourself  Advance directives can apply to any medical decision, such as the treatments you want, and if you want to donate organs  What are the types of advance directives? There are many types of advance directives, and each state has rules about how to use them  You may choose a combination of any of the following:  · Living will: This is a written record of the treatment you want  You can also choose which treatments you do not want, which to limit, and which to stop at a certain time   This includes surgery, medicine, IV fluid, and tube feedings  · Durable power of  for healthcare [facility]): This is a written record that states who you want to make healthcare choices for you when you are unable to make them for yourself  This person, called a proxy, is usually a family member or a friend  You may choose more than 1 proxy  · Do not resuscitate (DNR) order:  A DNR order is used in case your heart stops beating or you stop breathing  It is a request not to have certain forms of treatment, such as CPR  A DNR order may be included in other types of advance directives  · Medical directive: This covers the care that you want if you are in a coma, near death, or unable to make decisions for yourself  You can list the treatments you want for each condition  Treatment may include pain medicine, surgery, blood transfusions, dialysis, IV or tube feedings, and a ventilator (breathing machine)  · Values history: This document has questions about your views, beliefs, and how you feel and think about life  This information can help others choose the care that you would choose  Why are advance directives important? An advance directive helps you control your care  Although spoken wishes may be used, it is better to have your wishes written down  Spoken wishes can be misunderstood, or not followed  Treatments may be given even if you do not want them  An advance directive may make it easier for your family to make difficult choices about your care  © Copyright [employer] 2018 Information is for End User's use only and may not be sold, redistributed or otherwise used for commercial purposes  All illustrations and images included in CareNotes® are the copyrighted property of [employer]  or [facility] Yolanda Villalba    Hamstring Exercises   WHAT YOU NEED TO KNOW:   Hamstring exercises help strengthen and stretch the muscles that support your lower back, hips, and knee   This decreases pain, improves movement, and decreases your risk of future injury  DISCHARGE INSTRUCTIONS:   Contact your healthcare provider if:   · You have sharp or worsening pain during exercise or at rest     · You have questions or concern about your condition, care, or exercise program     Exercise safely:   · Move slowly and smoothly  Avoid fast or jerky motions  This will help prevent another injury  · Breathe normally  Do not hold your breath  It is important to breathe in and out so you do not tense up during exercise  Tension could prevent your muscles from stretching  · Do the exercises and stretches on both legs  Do this so the muscles on both legs remain strong and flexible  · Stop if you feel sharp pain or an increase in pain  Stop the exercise and contact your healthcare provider if you have these symptoms  It is normal to feel some discomfort, such as a dull ache, during exercise  Regular exercise will help decrease your discomfort over time  · Warm up before you stretch and exercise  This will help prevent an injury  Walk or ride a stationary bike for 5 to 10 minutes  How to perform stretching exercises:  Ask your healthcare provider how often to do these stretches:  · Hamstring stretch with a towel:  Lie on your back on the floor  Bend both legs so your feet rest flat  Lift one leg off the floor and loop a towel around your foot  Grasp the ends of the towel and slowly straighten your lifted leg  Use the towel to gently pull your leg toward you until you feel the stretch  Keep your leg straight and your foot flexed toward your body  Hold for 30 seconds  Use a longer towel if needed  · Sitting hamstring stretch:  Sit on the floor with both legs straight in front of you  Do not point your toes or flex your feet  Place your palms on the floor and slide your hands forward until you feel the stretch  Keep your back straight and do not lock your knees  Hold the stretch for 30 seconds           · Standing hamstring stretch:  Stand with your feet hips distance apart  Place one leg so it rests on a firm surface, such as a table or chair  Keep your toes pointing up  Slide both hands down the outside of your leg until you feel a stretch  Keep your chest lifted and your back straight  Hold for 30 seconds  · Sitting wide-leg stretch:  Sit on the floor and extend your legs as wide as possible  Keep your legs straight and lean over one leg  Slide your hands forward until you feel a stretch  Keep your chest lifted and your back straight  Hold for 30 seconds  How to perform strengthening exercises:  Always do strengthening exercises after you stretch  As you get stronger your healthcare provider may tell you to you add weights or more repetitions to your strengthening exercises  · Hamstring curls:  Place your hand on a wall or the back of a chair for balance  Place the weight in one of your legs  Lift the other leg and raise your heel toward your buttocks  Hold for 5 seconds  Slowly lower your leg until it is a few inches off the floor  Do 3 sets of 10  Repeat on other side  · Straight leg raise:  Lie on the floor with your face down  Rest your forehead on your folded arms  Keep your body in a straight line  Keep your hip bones on the floor, and tighten the butt and thigh muscles of your injured leg  Keep one leg straight and raise it toward the ceiling as high as you can  Hold for 5 seconds  Slowly return to the starting position  Do 3 sets of 10  Repeat on other side  · Half squats:  Stand with your feet shoulder distance apart  Rest your hands on the front of your thighs or reach them out in front of you  You may hold on to the back of a chair or wall for balance  Keep your chest lifted and lower your hips about 10 inches, as if you are going to sit  Make sure your weight is in your heels and hold for 5 seconds  Keep your weight in your heels and slowly stand  Do 3 sets of 10         Follow up with your healthcare provider as directed:  Write down your questions so you remember to ask them during your visits  © Copyright [address] Information is for End User's use only and may not be sold, redistributed or otherwise used for commercial purposes  All illustrations and images included in CareNotes® are the copyrighted property of [employer]  or Charli Villalba  The above information is an  only  It is not intended as medical advice for individual conditions or treatments  Talk to your doctor, nurse or pharmacist before following any medical regimen to see if it is safe and effective for you

## 2021-06-03 NOTE — PROGRESS NOTES
Assessment and Plan:     Problem List Items Addressed This Visit        Endocrine    Type 2 diabetes mellitus ([facility] [address] )    Relevant Orders    HEMOGLOBIN A1C W/ EAG ESTIMATION    Microalbumin / creatinine urine ratio    Lipid Panel with Direct LDL reflex      Other Visit Diagnoses     Medicare annual wellness visit, initial    -  Primary    Encounter for screening for HIV        Relevant Orders    HIV 1/2 Antigen/Antibody (4th Generation) w Reflex [facility]    Need for vaccination        Relevant Orders    PNEUMOCOCCAL POLYSACCHARIDE VACCINE 23-VALENT =>1YO SQ IM           Preventive health issues were discussed with patient, and age appropriate screening tests were ordered as noted in patient's After Visit Summary  Personalized health advice and appropriate referrals for health education or preventive services given if needed, as noted in patient's After Visit Summary  History of Present Illness:     Patient presents for Welcome to Medicare visit  Patient Care Team:  Courtney Carrero MD as PCP - General (Family Medicine)     Review of Systems:     Review of Systems   Constitutional: Negative  HENT: Negative  Eyes: Negative  Respiratory: Negative  Cardiovascular: Negative  Gastrointestinal: Negative  Musculoskeletal: Positive for arthralgias  Skin: Negative  Neurological: Negative  Psychiatric/Behavioral: Negative  Problem List:     Patient Active Problem List   Diagnosis    [facility]    Type 2 diabetes mellitus (HCC)    Foot pain, bilateral    GERD (gastroesophageal reflux disease)    Overweight    Venous stasis dermatitis of left lower extremity    Cellulitis of left lower leg      Past Medical and Surgical History:     Past Medical History:   Diagnosis Date    Abscess of lower leg 8/23/2020    Management per cellulitis above  No past surgical history on file  Family History:     No family history on file     Social History:        Social History Socioeconomic History    Marital status: Single     Spouse name: None    Number of children: None    Years of education: None    Highest education level: None   Occupational History    None   Social Needs    Financial resource strain: None    Food insecurity     Worry: None     Inability: None    Transportation needs     Medical: None     Non-medical: None   Tobacco Use    Smoking status: Former Smoker     Quit date: 2013     Years since quittin 7    Smokeless tobacco: Never Used   Substance and Sexual Activity    Alcohol use: Yes     Frequency: 2-4 times a month     Drinks per session: 3 or 4    Drug use: Yes     Types: Marijuana    Sexual activity: None   Lifestyle    Physical activity     Days per week: None     Minutes per session: None    Stress: None   Relationships    Social connections     Talks on phone: None     Gets together: None     Attends Tenriism service: None     Active member of club or organization: None     Attends meetings of clubs or organizations: None     Relationship status: None    Intimate partner violence     Fear of current or ex partner: None     Emotionally abused: None     Physically abused: None     Forced sexual activity: None   Other Topics Concern    None   Social History Narrative    None      Medications and Allergies:     Current Outpatient Medications   Medication Sig Dispense Refill    acetaminophen (TYLENOL) 650 mg CR tablet Take 1 tablet (650 mg total) by mouth every 8 (eight) hours as needed for mild pain 30 tablet 3    leflunomide (ARAVA) 20 MG tablet Take 20 mg by mouth daily  0    meloxicam (MOBIC) 15 mg tablet take 1 tablet by mouth once daily 30 tablet 2    metFORMIN (GLUCOPHAGE) 1000 MG tablet Take 1 tablet (1,000 mg total) by mouth 2 (two) times a day with meals 180 tablet 1    propranolol (INDERAL) 10 mg tablet       QUEtiapine (SEROQUEL) 200 mg tablet daily as needed       QUEtiapine (SEROquel) 300 mg tablet Take by mouth daily as needed      sitaGLIPtin (Januvia) 100 mg tablet Take 1 tablet (100 mg total) by mouth daily 90 tablet 0    Tofacitinib Citrate ER (Xeljanz XR) 11 MG TB24 Take 11 mg by mouth daily      ACCU-CHEK SOFTCLIX LANCETS lancets TEST once daily BEFORE BREAKFAST      Adalimumab 40 MG/0 8ML PNKT Inject 40 mg under the skin      Alcohol Swabs (ALCOHOL PADS) 70 % PADS use as directed once daily      ALPRAZolam (XANAX) 2 MG tablet 3 (three) times a day      amoxicillin (AMOXIL) 500 mg capsule       fluticasone (FLONASE) 50 mcg/act nasal spray 1 spray into each nostril daily (Patient not taking: Reported on 9/3/2020) 1 Bottle 2    glucose blood test strip test once daily before breakfast      HUMIRA PEN 40 MG/0 4ML PNKT       hydrOXYzine HCL (ATARAX) 25 mg tablet       hydrOXYzine HCL (ATARAX) 50 mg tablet       hydrOXYzine pamoate (VISTARIL) 50 mg capsule       ibuprofen (MOTRIN) 600 mg tablet       montelukast (SINGULAIR) 10 mg tablet Take 1 tablet (10 mg total) by mouth daily at bedtime (Patient not taking: Reported on 9/3/2020) 30 tablet 1    omeprazole (PriLOSEC) 20 mg delayed release capsule Take 1 capsule (20 mg total) by mouth daily (Patient not taking: Reported on 9/3/2020) 30 capsule 1     No current facility-administered medications for this visit        No Known Allergies   Immunizations:     Immunization History   Administered Date(s) Administered    INFLUENZA 10/29/2013, 10/27/2014, 10/27/2014, 10/20/2016, 10/20/2016, 10/18/2017, 10/29/2019    Influenza Split 10/29/2013    Influenza, injectable, quadrivalent, preservative free 0 5 mL 02/05/2019    Pneumococcal Conjugate 13-Valent 02/05/2019    Pneumococcal Polysaccharide PPV23 05/20/2014    Tdap 03/28/2014      Health Maintenance:         Topic Date Due    HIV Screening  Never done         Topic Date Due    COVID-19 Vaccine (1) Never done    Pneumococcal Vaccine: Pediatrics (0 to 5 Years) and At-Risk Patients (6 to 59 Years) (3 of 3 - PPSV23) 05/20/2019    Influenza Vaccine (Season Ended) 09/01/2021      Medicare Screening Tests and Risk Assessments:     Jamie is here for his Welcome to Medicare visit  Health Risk Assessment:   Patient rates overall health as fair  Patient feels that their physical health rating is slightly better  Patient is satisfied with their life  Eyesight was rated as same  Hearing was rated as same  Patient feels that their emotional and mental health rating is same  Patients states they are never, rarely angry  Patient states they are never, rarely unusually tired/fatigued  Pain experienced in the last 7 days has been some  Patient's pain rating has been 7/10  Patient states that he has experienced no weight loss or gain in last 6 months  Depression Screening:   PHQ-2 Score: 0      Fall Risk Screening: In the past year, patient has experienced: no history of falling in past year      Home Safety:  Patient does not have trouble with stairs inside or outside of their home  Patient has working smoke alarms and has working carbon monoxide detector  Home safety hazards include: none  Nutrition:   Current diet is Regular and Limited junk food  Medications:   Patient is not currently taking any over-the-counter supplements  Patient is able to manage medications  Activities of Daily Living (ADLs)/Instrumental Activities of Daily Living (IADLs):   Walk and transfer into and out of bed and chair?: Yes  Dress and groom yourself?: Yes    Bathe or shower yourself?: Yes    Feed yourself?  Yes  Do your laundry/housekeeping?: Yes  Manage your money, pay your bills and track your expenses?: Yes  Make your own meals?: Yes    Do your own shopping?: Yes    Previous Hospitalizations:   Any hospitalizations or ED visits within the last 12 months?: No      PREVENTIVE SCREENINGS      Cardiovascular Screening:    General: Screening Not Indicated and History Lipid Disorder      Diabetes Screening:     General: Screening Not Indicated and History Diabetes      Prostate Cancer Screening:    General: Screening Not Indicated      Abdominal Aortic Aneurysm (AAA) Screening:    Risk factors include: tobacco use        Lung Cancer Screening:     General: Screening Not Indicated    Screening, Brief Intervention, and Referral to Treatment (SBIRT)    Screening  Typical number of drinks in a day: 0  Typical number of drinks in a week: 0  Interpretation: Low risk drinking behavior  AUDIT-C Screenin) How often did you have a drink containing alcohol in the past year? 2 to 4 times a month  2) How many drinks did you have on a typical day when you were drinking in the past year? 3 to 4    Single Item Drug Screening:  How often have you used an illegal drug (including marijuana) or a prescription medication for non-medical reasons in the past year? never    Single Item Drug Screen Score: 0  Interpretation: Negative screen for possible drug use disorder    No exam data present     Physical Exam:     /70 (BP Location: Left arm, Patient Position: Sitting, Cuff Size: Standard)   Pulse (!) 106   Temp (!) 97 3 °F (36 3 °C) (Temporal)   Resp 18   Ht 6' (1 829 m)   Wt 81 9 kg (180 lb 8 oz)   SpO2 95%   BMI 24 48 kg/m²     Physical Exam  Constitutional:       General: He is not in acute distress  Appearance: Normal appearance  He is normal weight  HENT:      Head: Normocephalic and atraumatic  Mouth/Throat:      Mouth: Mucous membranes are moist    Eyes:      General:         Right eye: No discharge  Left eye: No discharge  Extraocular Movements: Extraocular movements intact  Pupils: Pupils are equal, round, and reactive to light  Neck:      Musculoskeletal: Normal range of motion and neck supple  Cardiovascular:      Rate and Rhythm: Normal rate  Pulses: no weak pulses          Dorsalis pedis pulses are 2+ on the right side and 2+ on the left side          Posterior tibial pulses are 2+ on the right side and 2+ on the left side  Pulmonary:      Effort: Pulmonary effort is normal  No respiratory distress  Breath sounds: No wheezing  Musculoskeletal: Normal range of motion  Right lower leg: No edema  Left lower leg: No edema  Feet:      Right foot:      Skin integrity: No ulcer, skin breakdown, erythema, warmth, callus or dry skin  Left foot:      Skin integrity: No ulcer, skin breakdown, erythema, warmth, callus or dry skin  Neurological:      General: No focal deficit present  Mental Status: He is alert and oriented to person, place, and time  Psychiatric:         Mood and Affect: Mood normal          Behavior: Behavior normal       Patient's shoes and socks removed  Right Foot/Ankle   Right Foot Inspection  Skin Exam: skin normal and skin intact no dry skin, no warmth, no callus, no erythema, no maceration, no abnormal color, no pre-ulcer, no ulcer and no callus                            Sensory     Proprioception: intact   Monofilament testing: intact  Vascular  Capillary refills: < 3 seconds  The right DP pulse is 2+  The right PT pulse is 2+  Left Foot/Ankle  Left Foot Inspection  Skin Exam: skin normal and skin intactno dry skin, no warmth, no erythema, no maceration, normal color, no pre-ulcer, no ulcer and no callus                                         Sensory     Proprioception: intact  Monofilament: intact  Vascular  Capillary refills: < 3 seconds  The left DP pulse is 2+  The left PT pulse is 2+  Assign Risk Category:  No deformity present; No loss of protective sensation;  No weak pulses       Risk: 0      Connie Boxer, MD

## 2021-06-03 NOTE — PROGRESS NOTES
Assessment/Plan:    Type 2 diabetes mellitus ([facility] [address] )    Lab Results   Component Value Date    HGBA1C 8 3 (A) 10/01/2020     - Currently managed with metformin and Januvia   - Will repeat hemoglobin A1C and repeat urine microalbumin/creatinine ratio   - Pneumovax 23 ordered however patient left before it could be administered; defer to next office visit   - Diabetic foot exam and eye exam performed at today's office visit     Encounter for screening for HIV  - Will screen for HIV per USPSTF recommendations        Diagnoses and all orders for this visit:    Medicare annual wellness visit, initial    Type 2 diabetes mellitus without complication, without long-term current use of insulin [facility]  -     HEMOGLOBIN A1C W/ EAG ESTIMATION; Future  -     Microalbumin / creatinine urine ratio  -     Lipid Panel with Direct LDL reflex; Future    Encounter for screening for HIV  -     HIV 1/2 Antigen/Antibody (4th Generation) w Reflex [facility]; Future    Need for vaccination  -     Cancel: PNEUMOCOCCAL POLYSACCHARIDE VACCINE 23-VALENT =>1YO SQ IM          Subjective:      Patient ID: Karen Cheatham  is a 39 y o  male  HPI     Karen Cheatham  is a 39year old male who presents today for a medicare annual wellness visit  Patient does complaint of intermittent tightness in his hamstrings as well as pain in his joints secondary to his rheumatoid arthritis but otherwise endorses no other complaints today       Review of systems     (See medicare annual wellness visit note for review of systems)     Objective:      /70 (BP Location: Left arm, Patient Position: Sitting, Cuff Size: Standard)   Pulse (!) 106   Temp (!) 97 3 °F (36 3 °C) (Temporal)   Resp 18   Ht 6' (1 829 m)   Wt 81 9 kg (180 lb 8 oz)   SpO2 95%   BMI 24 48 kg/m²         Physical Examination   (See medicare annual wellness visit note for Physical exam findings)

## 2021-06-09 ENCOUNTER — APPOINTMENT (OUTPATIENT)
Dept: LAB | Facility: HOSPITAL | Age: 46
End: 2021-06-09
Payer: COMMERCIAL

## 2021-06-09 DIAGNOSIS — E11.9 TYPE 2 DIABETES MELLITUS WITHOUT COMPLICATION, WITHOUT LONG-TERM CURRENT USE OF INSULIN (HCC): ICD-10-CM

## 2021-06-09 DIAGNOSIS — Z11.4 ENCOUNTER FOR SCREENING FOR HIV: ICD-10-CM

## 2021-06-09 LAB
CHOLEST SERPL-MCNC: 180 MG/DL
CREAT UR-MCNC: 82.5 MG/DL
EST. AVERAGE GLUCOSE BLD GHB EST-MCNC: 217 MG/DL
HBA1C MFR BLD: 9.2 %
HDLC SERPL-MCNC: 30 MG/DL
LDLC SERPL DIRECT ASSAY-MCNC: 90.45 MG/DL (ref 0–100)
MICROALBUMIN UR-MCNC: 7.1 MG/L (ref 0–20)
MICROALBUMIN/CREAT 24H UR: 9 MG/G CREATININE (ref 0–30)
TRIGL SERPL-MCNC: 438 MG/DL

## 2021-06-09 PROCEDURE — 82570 ASSAY OF URINE CREATININE: CPT | Performed by: FAMILY MEDICINE

## 2021-06-09 PROCEDURE — 3061F NEG MICROALBUMINURIA REV: CPT | Performed by: FAMILY MEDICINE

## 2021-06-09 PROCEDURE — 36415 COLL VENOUS BLD VENIPUNCTURE: CPT

## 2021-06-09 PROCEDURE — 80061 LIPID PANEL: CPT

## 2021-06-09 PROCEDURE — 87389 HIV-1 AG W/HIV-1&-2 AB AG IA: CPT

## 2021-06-09 PROCEDURE — 83036 HEMOGLOBIN GLYCOSYLATED A1C: CPT

## 2021-06-09 PROCEDURE — 3046F HEMOGLOBIN A1C LEVEL >9.0%: CPT | Performed by: FAMILY MEDICINE

## 2021-06-09 PROCEDURE — 82043 UR ALBUMIN QUANTITATIVE: CPT | Performed by: FAMILY MEDICINE

## 2021-06-09 PROCEDURE — 83721 ASSAY OF BLOOD LIPOPROTEIN: CPT

## 2021-06-10 LAB — HIV 1+2 AB+HIV1 P24 AG SERPL QL IA: NORMAL

## 2021-06-17 ENCOUNTER — OFFICE VISIT (OUTPATIENT)
Dept: FAMILY MEDICINE CLINIC | Facility: CLINIC | Age: 46
End: 2021-06-17

## 2021-06-17 VITALS
OXYGEN SATURATION: 98 % | HEART RATE: 96 BPM | WEIGHT: 180 LBS | SYSTOLIC BLOOD PRESSURE: 140 MMHG | TEMPERATURE: 96.8 F | RESPIRATION RATE: 16 BRPM | BODY MASS INDEX: 24.38 KG/M2 | DIASTOLIC BLOOD PRESSURE: 90 MMHG | HEIGHT: 72 IN

## 2021-06-17 DIAGNOSIS — E11.9 TYPE 2 DIABETES MELLITUS WITHOUT COMPLICATION, WITHOUT LONG-TERM CURRENT USE OF INSULIN (HCC): ICD-10-CM

## 2021-06-17 DIAGNOSIS — M06.9 RHEUMATOID ARTHRITIS, INVOLVING UNSPECIFIED SITE, UNSPECIFIED WHETHER RHEUMATOID FACTOR PRESENT (HCC): ICD-10-CM

## 2021-06-17 DIAGNOSIS — Z91.09 ENVIRONMENTAL ALLERGIES: Primary | ICD-10-CM

## 2021-06-17 PROCEDURE — 3008F BODY MASS INDEX DOCD: CPT | Performed by: FAMILY MEDICINE

## 2021-06-17 PROCEDURE — 99213 OFFICE O/P EST LOW 20 MIN: CPT | Performed by: FAMILY MEDICINE

## 2021-06-17 RX ORDER — LORATADINE 10 MG/1
10 TABLET ORAL DAILY
Qty: 100 TABLET | Refills: 3 | Status: SHIPPED | OUTPATIENT
Start: 2021-06-17

## 2021-06-17 NOTE — ASSESSMENT & PLAN NOTE
Lab Results   Component Value Date    HGBA1C 9 2 (H) 06/09/2021   Mildly worsened, uninterested in insulin at this time  Will continue with current regimen, Metformin, Jardiance and reassess in 3 months  Recently seen ophthalmologist

## 2021-06-17 NOTE — ASSESSMENT & PLAN NOTE
Symptoms are currently controlled at this time   Avoid exposure to polluted air, tobacco smoke, and other known allergy triggers  Discussed the PRN use of OTC nasal sinus rinse to help with symptoms   Discussed importance of machine drying as opposed to hanging them outside on a clothesline to decrease allergens   Symptoms may improve if windows are kept close in the summer   Be sure air conditioner filter is clean   Consider home air purifier to remove allergens

## 2021-06-17 NOTE — ASSESSMENT & PLAN NOTE
Follows with Dr Gordon Feeling  Has upcoming appointment this afternoon               - continue Echo Carr

## 2021-06-17 NOTE — PROGRESS NOTES
Assessment/Plan:    Environmental allergies  Symptoms are currently controlled at this time   Avoid exposure to polluted air, tobacco smoke, and other known allergy triggers  Discussed the PRN use of OTC nasal sinus rinse to help with symptoms   Discussed importance of machine drying as opposed to hanging them outside on a clothesline to decrease allergens   Symptoms may improve if windows are kept close in the summer   Be sure air conditioner filter is clean   Consider home air purifier to remove allergens  Rheumatoid arthritis ([place] [address] )  Follows with Dr Ovidio Chávez  Has upcoming appointment this afternoon               - continue Edda Mccain    Type 2 diabetes mellitus ([place] [address] )    Lab Results   Component Value Date    HGBA1C 9 2 (H) 06/09/2021   Mildly worsened, uninterested in insulin at this time  Will continue with current regimen, Metformin, Jardiance and reassess in 3 months  Recently seen ophthalmologist        Diagnoses and all orders for this visit:    Environmental allergies  -     loratadine (CLARITIN) 10 mg tablet; Take 1 tablet (10 mg total) by mouth daily    Rheumatoid arthritis, involving unspecified site, unspecified whether rheumatoid factor present ([place] [address] )    Type 2 diabetes mellitus without complication, without long-term current use of insulin ([facility])          Subjective:      Patient ID: Karen Cheatham  is a 39 y o  male  HPI  This is a very pleasant 39 y o  male who presents to the clinic for management of their chronic medical conditions  Patient's medical conditions are stable unless noted otherwise above  Patient has not had any recent hospitalizations, or medical emergencies since last visit  Patient has no further complaints other than what is mentioned in the ROS    The following portions of the patient's history were reviewed and updated as appropriate: allergies, current medications, past family history, past medical history, past social history, past surgical history and problem list     Review of Systems   Constitutional: Negative for activity change, appetite change, chills, diaphoresis, fatigue and fever  HENT: Negative for congestion, ear pain, hearing loss, postnasal drip, rhinorrhea, sneezing, sore throat and tinnitus  Eyes: Negative for pain  Respiratory: Negative for apnea, cough, choking, chest tightness, shortness of breath and wheezing  Cardiovascular: Negative for chest pain, palpitations and leg swelling  Gastrointestinal: Negative for abdominal distention, abdominal pain, constipation, diarrhea, nausea and vomiting  Genitourinary: Negative for decreased urine volume and dysuria  Musculoskeletal: Negative for back pain  Skin: Negative for rash  Neurological: Negative for dizziness, tremors and syncope  Psychiatric/Behavioral: Negative for agitation and suicidal ideas  Objective:      /90 (BP Location: Left arm, Patient Position: Sitting, Cuff Size: Standard)   Pulse 96   Temp (!) 96 8 °F (36 °C) (Temporal)   Resp 16   Ht 6' (1 829 m)   Wt 81 6 kg (180 lb)   SpO2 98%   BMI 24 41 kg/m²          Physical Exam  Constitutional:       General: He is not in acute distress  Appearance: He is well-developed  He is not diaphoretic  HENT:      Head: Normocephalic and atraumatic  Right Ear: External ear normal       Left Ear: External ear normal       Mouth/Throat:      Pharynx: No oropharyngeal exudate  Eyes:      General: No scleral icterus  Conjunctiva/sclera: Conjunctivae normal       Pupils: Pupils are equal, round, and reactive to light  Neck:      Thyroid: No thyromegaly  Trachea: No tracheal deviation  Cardiovascular:      Rate and Rhythm: Normal rate and regular rhythm  Heart sounds: Normal heart sounds  No murmur heard  Pulmonary:      Effort: Pulmonary effort is normal  No respiratory distress  Breath sounds: Normal breath sounds  No wheezing     Abdominal:      General: Bowel sounds are normal  There is no distension  Palpations: Abdomen is soft  Tenderness: There is no rebound  Musculoskeletal:         General: Normal range of motion  Cervical back: Normal range of motion and neck supple  Comments: RA nodules on bilateral hands and L ankle     Skin:     General: Skin is warm  Neurological:      Mental Status: He is alert and oriented to person, place, and time

## 2021-07-07 ENCOUNTER — OFFICE VISIT (OUTPATIENT)
Dept: FAMILY MEDICINE CLINIC | Facility: CLINIC | Age: 46
End: 2021-07-07

## 2021-07-07 VITALS
HEIGHT: 72 IN | SYSTOLIC BLOOD PRESSURE: 140 MMHG | BODY MASS INDEX: 24.24 KG/M2 | RESPIRATION RATE: 16 BRPM | WEIGHT: 179 LBS | TEMPERATURE: 98 F | DIASTOLIC BLOOD PRESSURE: 90 MMHG | HEART RATE: 97 BPM | OXYGEN SATURATION: 97 %

## 2021-07-07 DIAGNOSIS — S51.012A ELBOW LACERATION, LEFT, INITIAL ENCOUNTER: Primary | ICD-10-CM

## 2021-07-07 PROCEDURE — 99213 OFFICE O/P EST LOW 20 MIN: CPT | Performed by: NURSE PRACTITIONER

## 2021-07-07 PROCEDURE — 3725F SCREEN DEPRESSION PERFORMED: CPT | Performed by: NURSE PRACTITIONER

## 2021-07-07 PROCEDURE — 90471 IMMUNIZATION ADMIN: CPT | Performed by: NURSE PRACTITIONER

## 2021-07-07 PROCEDURE — 90715 TDAP VACCINE 7 YRS/> IM: CPT | Performed by: NURSE PRACTITIONER

## 2021-07-07 PROCEDURE — 1036F TOBACCO NON-USER: CPT | Performed by: NURSE PRACTITIONER

## 2021-07-07 PROCEDURE — 3008F BODY MASS INDEX DOCD: CPT | Performed by: NURSE PRACTITIONER

## 2021-07-07 NOTE — ASSESSMENT & PLAN NOTE
>12 hours since initial trauma  No stitch indicated, dressed with steri strips, bacitracin, wrapped with gauze  Advised to keep clean dry and intact  Leave Steri-Strips on until they fall off on their own  We will give a tetanus shot today
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
## 2021-07-09 ENCOUNTER — TELEPHONE (OUTPATIENT)
Dept: FAMILY MEDICINE CLINIC | Facility: CLINIC | Age: 46
End: 2021-07-09

## 2021-07-09 NOTE — TELEPHONE ENCOUNTER
Pt left a message on the nurse line in regards of the wound he has on his elbow, pt requesting clarification again since he was told he cant wet it

## 2021-08-31 ENCOUNTER — VBI (OUTPATIENT)
Dept: ADMINISTRATIVE | Facility: OTHER | Age: 46
End: 2021-08-31

## 2021-09-08 ENCOUNTER — OFFICE VISIT (OUTPATIENT)
Dept: FAMILY MEDICINE CLINIC | Facility: CLINIC | Age: 46
End: 2021-09-08

## 2021-09-08 VITALS
BODY MASS INDEX: 24.38 KG/M2 | DIASTOLIC BLOOD PRESSURE: 74 MMHG | WEIGHT: 180 LBS | SYSTOLIC BLOOD PRESSURE: 118 MMHG | HEART RATE: 100 BPM | OXYGEN SATURATION: 95 % | HEIGHT: 72 IN | RESPIRATION RATE: 16 BRPM | TEMPERATURE: 97.6 F

## 2021-09-08 DIAGNOSIS — E11.9 TYPE 2 DIABETES MELLITUS WITHOUT COMPLICATION, WITHOUT LONG-TERM CURRENT USE OF INSULIN (HCC): ICD-10-CM

## 2021-09-08 DIAGNOSIS — E11.8 TYPE 2 DIABETES MELLITUS WITH COMPLICATION, WITHOUT LONG-TERM CURRENT USE OF INSULIN (HCC): Primary | ICD-10-CM

## 2021-09-08 LAB — SL AMB POCT HEMOGLOBIN AIC: 9.6 (ref ?–6.5)

## 2021-09-08 PROCEDURE — 1036F TOBACCO NON-USER: CPT | Performed by: FAMILY MEDICINE

## 2021-09-08 PROCEDURE — 3008F BODY MASS INDEX DOCD: CPT | Performed by: FAMILY MEDICINE

## 2021-09-08 PROCEDURE — 99213 OFFICE O/P EST LOW 20 MIN: CPT | Performed by: FAMILY MEDICINE

## 2021-09-08 PROCEDURE — 3046F HEMOGLOBIN A1C LEVEL >9.0%: CPT | Performed by: FAMILY MEDICINE

## 2021-09-08 PROCEDURE — 83036 HEMOGLOBIN GLYCOSYLATED A1C: CPT | Performed by: FAMILY MEDICINE

## 2021-09-08 NOTE — PROGRESS NOTES
[facility] [place]   [address] [place]  Phone#  [phone]  Fax#  [phone]    ASSESSMENT and PLAN    1  Type II Diabetes Mellitus   Assessment   Uncontrolled   Home Blood Glucose Logs: not checking blood sugars at home; does not have supplies    Last A1c-9 2 (6/2021),Goal A1C<7   A1C Today-9 6   Last Micro/Cr: wnl   Home medications: Metformin 1000 mg BID, Januvia 100 mg qd   Last Diabetic Foot Exam: 6/2021   Last Ophthalmologic Appointment: 6/2021     Plan   Continue Metformin 1,000 mg BID, Januvia 100 mg qd   If sugars are continually not controlled will have to either add another oral agent or start Insulin   Not interested in Diabetes Cares Program or nutritionist    Will need B12 level at next appointment   Labs ordered: A1C   Educated the patient about diet and exercise extensively and states he will start changing diet and exercise habits    Dietary information in AVS   Advised about the importance of keeping a daily blood glucose log-ordered glucometer supplies    In the event patient develops hypoglycemic symptoms; advised to drink juice or eat a sugary snack     2  Hypertriglyceridemia   · TAG's 6/2021-438  · No treatment less than 500  · Continued diet and exercise     Diagnoses and all orders for this visit:    Type 2 diabetes mellitus with complication, without long-term current use of insulin (HCC)  -     POCT hemoglobin A1c  -     Glucometer  -     Glucometer test strips    Type 2 diabetes mellitus without complication, without long-term current use of insulin (HCC)    Other orders  -     Cancel: PNEUMOCOCCAL POLYSACCHARIDE VACCINE 23-VALENT =>3YO SQ IM        HISTORY OF PRESENT ILLNESS  Kye Grijalva  is a 39 y o  male with a significant PMHx of DMII, RA, Hypertriglyceridemia, who presents to the clinic for  management of their chronic medical conditions  Patient's medical conditions are stable unless noted otherwise above  Patient has not had any recent hospitalizations, or medical emergencies since last visit  Patient has no further complaints other than what is mentioned in the ROS  Smoking/Alcohol/Illicit Drug Use: denies cigarette use  Socially drinks  Denies any illicit drugs     REVIEW OF SYSTEMS  Review of Systems   Constitutional: Negative for chills, fatigue, fever and unexpected weight change  HENT: Negative for congestion, rhinorrhea, sinus pressure and sore throat  Eyes: Negative for visual disturbance  Respiratory: Negative for cough, chest tightness, shortness of breath and wheezing  Cardiovascular: Negative for chest pain  Gastrointestinal: Negative for abdominal distention, abdominal pain, blood in stool, constipation, diarrhea, nausea and vomiting  Genitourinary: Negative for difficulty urinating, dysuria, frequency, hematuria and urgency  Musculoskeletal: Negative for back pain and neck pain  Skin: Negative for rash  Allergic/Immunologic: Negative  Neurological: Negative for dizziness, weakness, light-headedness, numbness and headaches  Psychiatric/Behavioral: Negative for behavioral problems  PAST MEDICAL HISTORY   Past Medical History:   Diagnosis Date    Abscess of lower leg 2020    Management per cellulitis above  No past surgical history on file  Social History     Socioeconomic History    Marital status: Single     Spouse name: Not on file    Number of children: Not on file    Years of education: Not on file    Highest education level: Not on file   Occupational History    Not on file   Tobacco Use    Smoking status: Former Smoker     Quit date: 2013     Years since quittin 0    Smokeless tobacco: Never Used   Substance and Sexual Activity    Alcohol use:  Yes    Drug use: Yes     Types: Marijuana    Sexual activity: Not on file   Other Topics Concern    Not on file   Social History Narrative    Not on file     Social Determinants of Health Financial Resource Strain:     Difficulty of Paying Living Expenses:    Food Insecurity:     Worried About Running Out of Food in the Last Year:     [address] in the Last Year:    Transportation Needs:     Lack of Transportation (Medical):  Lack of Transportation (Non-Medical):    Physical Activity:     Days of Exercise per Week:     Minutes of Exercise per Session:    Stress:     Feeling of Stress :    Social Connections:     Frequency of Communication with Friends and Family:     Frequency of Social Gatherings with Friends and Family:     Attends Judaism Services:     Active Member of Clubs or Organizations:     Attends Club or Organization Meetings:     Marital Status:    Intimate Partner Violence:     Fear of Current or Ex-Partner:     Emotionally Abused:     Physically Abused:     Sexually Abused:      No family history on file      MEDICATIONS    Current Outpatient Medications:     acetaminophen (TYLENOL) 650 mg CR tablet, Take 1 tablet (650 mg total) by mouth every 8 (eight) hours as needed for mild pain, Disp: 30 tablet, Rfl: 3    Adalimumab 40 MG/0 8ML PNKT, Inject 40 mg under the skin, Disp: , Rfl:     ALPRAZolam (XANAX) 2 MG tablet, 3 (three) times a day, Disp: , Rfl:     HUMIRA PEN 40 MG/0 4ML PNKT, , Disp: , Rfl:     hydrOXYzine HCL (ATARAX) 25 mg tablet, , Disp: , Rfl:     hydrOXYzine HCL (ATARAX) 50 mg tablet, , Disp: , Rfl:     hydrOXYzine pamoate (VISTARIL) 50 mg capsule, , Disp: , Rfl:     ibuprofen (MOTRIN) 600 mg tablet, , Disp: , Rfl:     leflunomide (ARAVA) 20 MG tablet, Take 20 mg by mouth daily, Disp: , Rfl: 0    loratadine (CLARITIN) 10 mg tablet, Take 1 tablet (10 mg total) by mouth daily, Disp: 100 tablet, Rfl: 3    meloxicam (MOBIC) 15 mg tablet, take 1 tablet by mouth once daily, Disp: 30 tablet, Rfl: 2    metFORMIN (GLUCOPHAGE) 1000 MG tablet, Take 1 tablet (1,000 mg total) by mouth 2 (two) times a day with meals, Disp: 180 tablet, Rfl: 1   propranolol (INDERAL) 10 mg tablet, , Disp: , Rfl:     QUEtiapine (SEROQUEL) 200 mg tablet, daily as needed , Disp: , Rfl:     QUEtiapine (SEROquel) 300 mg tablet, Take by mouth daily as needed, Disp: , Rfl:     sitaGLIPtin (Januvia) 100 mg tablet, Take 1 tablet (100 mg total) by mouth daily, Disp: 90 tablet, Rfl: 0    Tofacitinib Citrate ER (Xeljanz XR) 11 MG TB24, Take 11 mg by mouth daily, Disp: , Rfl:     PHYSICAL EXAM  There were no vitals filed for this visit  Wt Readings from Last 3 Encounters:   07/07/21 81 2 kg (179 lb)   06/17/21 81 6 kg (180 lb)   06/03/21 81 9 kg (180 lb 8 oz)    , There is no height or weight on file to calculate BMI  Physical Exam  Vitals and nursing note reviewed  Constitutional:       General: He is not in acute distress  Appearance: He is well-developed  He is not toxic-appearing  HENT:      Head: Normocephalic and atraumatic  Eyes:      Extraocular Movements: Extraocular movements intact  Pupils: Pupils are equal, round, and reactive to light  Cardiovascular:      Rate and Rhythm: Normal rate and regular rhythm  Heart sounds: Normal heart sounds  No murmur heard  Pulmonary:      Effort: Pulmonary effort is normal  No respiratory distress  Breath sounds: Normal breath sounds  No wheezing, rhonchi or rales  Abdominal:      General: Bowel sounds are normal  There is no distension  Palpations: Abdomen is soft  Tenderness: There is no abdominal tenderness  There is no guarding  Musculoskeletal:         General: Normal range of motion  Cervical back: Normal range of motion and neck supple  Skin:     General: Skin is warm and dry  Neurological:      Mental Status: He is alert and oriented to person, place, and time  Psychiatric:         Behavior: Behavior normal          LABS/IMAGING  Hemoglobin A1C   Date Value Ref Range Status   06/09/2021 9 2 (H) Normal 3 8-5 6%; PreDiabetic 5 7-6 4%;  Diabetic >=6 5%; Glycemic control for adults with diabetes <7 0% % Final     HDL, Direct   Date Value Ref Range Status   06/09/2021 30 (L) >=40 mg/dL Final     Comment:     HDL Cholesterol:       Low     <41 mg/dL  Specimen collection should occur prior to Metamizole administration due to the potential for falsley depressed results  Triglycerides   Date Value Ref Range Status   06/09/2021 438 (H) <150 mg/dL Final     Comment:     Triglyceride:     Normal          <150 mg/dl     Borderline High 150-199 mg/dl     High            200-499 mg/dl        Very High       >499 mg/dl    Specimen collection should occur prior to N-Acetylcysteine or Metamizole administration due to the potential for falsely depressed results        WBC   Date Value Ref Range Status   04/16/2021 4 90 4 50 - 11 00 Thousand/uL Final   01/05/2021 7 80 4 50 - 11 00 Thousand/uL Final   07/13/2020 7 70 4 50 - 11 00 Thousand/uL Final   06/07/2018 6 8 4 5 - 11 0 K/MCL Final     Hemoglobin   Date Value Ref Range Status   04/16/2021 12 7 (L) 13 5 - 17 5 g/dL Final   01/05/2021 13 8 13 5 - 17 5 g/dL Final   07/13/2020 14 0 13 5 - 17 5 g/dL Final   06/07/2018 13 5 13 5 - 17 5 G/DL Final     Platelets   Date Value Ref Range Status   04/16/2021 271 150 - 450 Thousands/uL Final   01/05/2021 326 150 - 450 Thousands/uL Final   07/13/2020 202 150 - 450 Thousands/uL Final   06/07/2018 264 150 - 450 K/MCL Final     Potassium   Date Value Ref Range Status   01/05/2021 4 0 3 6 - 5 0 mmol/L Final   07/28/2020 3 8 3 6 - 5 0 mmol/L Final   02/22/2019 3 7 3 6 - 5 0 mmol/L Final     Chloride   Date Value Ref Range Status   01/05/2021 105 97 - 108 mmol/L Final   07/28/2020 104 97 - 108 mmol/L Final   02/22/2019 100 97 - 108 mmol/L Final     CO2   Date Value Ref Range Status   01/05/2021 28 22 - 30 mmol/L Final   07/28/2020 27 22 - 30 mmol/L Final   02/22/2019 28 22 - 30 mmol/L Final     BUN   Date Value Ref Range Status   01/05/2021 9 5 - 25 mg/dL Final   07/28/2020 8 5 - 25 mg/dL Final   02/22/2019 8 5 - 25 mg/dL Final     Creatinine   Date Value Ref Range Status   04/16/2021 0 73 0 70 - 1 50 mg/dL Final     Comment:     Standardized to IDMS reference method   01/05/2021 0 56 (L) 0 70 - 1 50 mg/dL Final     Comment:     Standardized to IDMS reference method   07/28/2020 0 61 (L) 0 70 - 1 50 mg/dL Final     Comment:     Standardized to IDMS reference method     eGFR   Date Value Ref Range Status   04/16/2021 114 >60 ml/min/1 73sq m Final   01/05/2021 127 >60 ml/min/1 73sq m Final   07/28/2020 123 >60 ml/min/1 73sq m Final     Calcium   Date Value Ref Range Status   01/05/2021 9 3 8 4 - 10 2 mg/dL Final   07/28/2020 9 3 8 4 - 10 2 mg/dL Final   02/22/2019 10 0 8 4 - 10 2 mg/dL Final     AST   Date Value Ref Range Status   04/16/2021 21 17 - 59 U/L Final     Comment:     Specimen collection should occur prior to Sulfasalazine administration due to the potential for falsely depressed results  01/05/2021 26 17 - 59 U/L Final     Comment:     Specimen collection should occur prior to Sulfasalazine administration due to the potential for falsely depressed results  07/28/2020 16 (L) 17 - 59 U/L Final     Comment:       Specimen collection should occur prior to Sulfasalazine administration due to the potential for falsely depressed results  06/07/2018 12 (L) 17 - 59 U/L Final     ALT   Date Value Ref Range Status   04/16/2021 24 <50 U/L Final   01/05/2021 22 9 - 52 U/L Final     Comment:     Specimen collection should occur prior to Sulfasalazine administration due to the potential for falsely depressed results  07/28/2020 16 9 - 52 U/L Final     Comment:       Specimen collection should occur prior to Sulfasalazine administration due to the potential for falsely depressed results      06/07/2018 25 9 - 52 U/L Final     Alkaline Phosphatase   Date Value Ref Range Status   01/05/2021 77 43 - 122 U/L Final   07/28/2020 87 43 - 122 U/L Final   02/22/2019 98 43 - 122 U/L Final     Magnesium   Date Value Ref Range Status   02/22/2019 1 9 1 6 - 2 3 mg/dL Final     No results found for: TSH    This note has been dictated using [employer] software  It may contain errors, including improperly dictated words  Please contact physician directly for any questions       Salomon Menard MD   PGY-2

## 2021-09-08 NOTE — PATIENT INSTRUCTIONS
in plant foods, such as walnuts, flaxseed, soybeans, and canola oil  Foods to limit or avoid:   · Grains:      ? Snacks that are made with partially hydrogenated oils, such as chips, regular crackers, and butter-flavored popcorn    ? High-fat baked goods, such as biscuits, croissants, doughnuts, pies, cookies, and pastries    · Dairy:      ? Whole milk, 2% milk, and yogurt and ice cream made with whole milk    ? Half and half creamer, heavy cream, and whipping cream    ? Cheese, cream cheese, and sour cream    · Meats and proteins:      ? High-fat cuts of meat (T-bone steak, regular hamburger, and ribs)    ? Fried meat, poultry (turkey and chicken), and fish    ? Poultry (chicken and turkey) with skin    ? Cold cuts (salami or bologna), hot dogs, dailey, and sausage    ? Whole eggs and egg yolks    · Vegetables and fruits with added fat:      ? Fried vegetables or vegetables in butter or high-fat sauces, such as cream or cheese sauces    ? Fried fruit or fruit served with butter or cream    · Fats:      ? Butter, stick margarine, and shortening    ? Coconut, palm oil, and palm kernel oil    Foods to include:   · Grains:      ? Whole-grain breads, cereals, pasta, and brown rice    ? Low-fat crackers and pretzels    · Vegetables and fruits:      ? Fresh, frozen, or canned vegetables (no salt or low-sodium)    ? Fresh, frozen, dried, or canned fruit (canned in light syrup or fruit juice)    ? Avocado    · Low-fat dairy products:      ? Nonfat (skim) or 1% milk    ? Nonfat or low-fat cheese, yogurt, and cottage cheese    · Meats and proteins:      ? Chicken or turkey with no skin    ? Baked or broiled fish    ? Lean beef and pork (loin, round, extra lean hamburger)    ? Beans and peas, unsalted nuts, soy products    ? Egg whites and substitutes    ? Seeds and nuts    · Fats:      ? Unsaturated oil, such as canola, olive, peanut, soybean, or sunflower oil    ?  Soft or liquid margarine and vegetable oil spread    ? Low-fat salad dressing    Other ways to decrease fat:   · Read food labels before you buy foods  Choose foods that have less than 30% of calories from fat  Choose low-fat or fat-free dairy products  Remember that fat free does not mean calorie free  These foods still contain calories, and too many calories can lead to weight gain  · Trim fat from meat and avoid fried food  Trim all visible fat from meat before you cook it  Remove the skin from poultry  Do not babin meat, fish, or poultry  Bake, roast, boil, or broil these foods instead  Avoid fried foods  Eat a baked potato instead of [place]iselda fries  Steam vegetables instead of sautéing them in butter  · Add less fat to foods  Use imitation dailey bits on salads and baked potatoes instead of regular dailey bits  Use fat-free or low-fat salad dressings instead of regular dressings  Use low-fat or nonfat butter-flavored topping instead of regular butter or margarine on popcorn and other foods  Ways to decrease fat in recipes:  Replace high-fat ingredients with low-fat or nonfat ones  This may cause baked goods to be drier than usual  You may need to use nonfat cooking spray on pans to prevent food from sticking  You also may need to change the amount of other ingredients, such as water, in the recipe  Try the following:  · Use low-fat or light margarine instead of regular margarine or shortening  · Use lean ground turkey breast or chicken, or lean ground beef (less than 5% fat) instead of hamburger  · Add 1 teaspoon of canola oil to 8 ounces of skim milk instead of using cream or half and half  · Use grated zucchini, carrots, or apples in breads instead of coconut  · Use blenderized, low-fat cottage cheese, plain tofu, or low-fat ricotta cheese instead of cream cheese  · Use 1 egg white and 1 teaspoon of canola oil, or use ¼ cup (2 ounces) of fat-free egg substitute instead of a whole egg       · Replace half of the oil that is called for in a recipe with applesauce when you bake  Use 3 tablespoons of cocoa powder and 1 tablespoon of canola oil instead of a square of baking chocolate  How to increase fiber:  Eat enough high-fiber foods to get 20 to 30 grams of fiber every day  Slowly increase your fiber intake to avoid stomach cramps, gas, and other problems  · Eat 3 ounces of whole-grain foods each day  An ounce is about 1 slice of bread  Eat whole-grain breads, such as whole-wheat bread  Whole wheat, whole-wheat flour, or other whole grains should be listed as the first ingredient on the food label  Replace white flour with whole-grain flour or use half of each in recipes  Whole-grain flour is heavier than white flour, so you may have to add more yeast or baking powder  · Eat a high-fiber cereal for breakfast   Oatmeal is a good source of soluble fiber  Look for cereals that have bran or fiber in the name  Choose whole-grain products, such as brown rice, barley, and whole-wheat pasta  · Eat more beans, peas, and lentils  For example, add beans to soups or salads  Eat at least 5 cups of fruits and vegetables each day  Eat fruits and vegetables with the peel because the peel is high in fiber  © Copyright [employer] 2021 Information is for End User's use only and may not be sold, redistributed or otherwise used for commercial purposes  All illustrations and images included in CareNotes® are the copyrighted property of [employer]  or [facility] Yolanda Major   The above information is an  only  It is not intended as medical advice for individual conditions or treatments  Talk to your doctor, nurse or pharmacist before following any medical regimen to see if it is safe and effective for you

## 2021-09-14 ENCOUNTER — APPOINTMENT (OUTPATIENT)
Dept: LAB | Facility: HOSPITAL | Age: 46
End: 2021-09-14
Payer: COMMERCIAL

## 2021-09-14 DIAGNOSIS — Z51.81 ENCOUNTER FOR MONITORING LEFLUNOMIDE THERAPY: ICD-10-CM

## 2021-09-14 DIAGNOSIS — Z79.899 ENCOUNTER FOR MONITORING LEFLUNOMIDE THERAPY: ICD-10-CM

## 2021-09-14 LAB
ALT SERPL W P-5'-P-CCNC: 20 U/L
AST SERPL W P-5'-P-CCNC: 24 U/L (ref 17–59)
BASOPHILS # BLD AUTO: 0 THOUSANDS/ΜL (ref 0–0.1)
BASOPHILS NFR BLD AUTO: 1 % (ref 0–1)
CREAT SERPL-MCNC: 0.59 MG/DL (ref 0.7–1.5)
EOSINOPHIL # BLD AUTO: 0.4 THOUSAND/ΜL (ref 0–0.4)
EOSINOPHIL NFR BLD AUTO: 6 % (ref 0–6)
ERYTHROCYTE [DISTWIDTH] IN BLOOD BY AUTOMATED COUNT: 12.8 %
GFR SERPL CREATININE-BSD FRML MDRD: 124 ML/MIN/1.73SQ M
HCT VFR BLD AUTO: 41.3 % (ref 41–53)
HGB BLD-MCNC: 13.7 G/DL (ref 13.5–17.5)
LYMPHOCYTES # BLD AUTO: 0.8 THOUSANDS/ΜL (ref 0.5–4)
LYMPHOCYTES NFR BLD AUTO: 15 % (ref 25–45)
MCH RBC QN AUTO: 32.1 PG (ref 26–34)
MCHC RBC AUTO-ENTMCNC: 33.1 G/DL (ref 31–36)
MCV RBC AUTO: 97 FL (ref 80–100)
MONOCYTES # BLD AUTO: 0.5 THOUSAND/ΜL (ref 0.2–0.9)
MONOCYTES NFR BLD AUTO: 9 % (ref 1–10)
NEUTROPHILS # BLD AUTO: 3.9 THOUSANDS/ΜL (ref 1.8–7.8)
NEUTS SEG NFR BLD AUTO: 69 % (ref 45–65)
PLATELET # BLD AUTO: 238 THOUSANDS/UL (ref 150–450)
PMV BLD AUTO: 9.7 FL (ref 8.9–12.7)
RBC # BLD AUTO: 4.25 MILLION/UL (ref 4.5–5.9)
WBC # BLD AUTO: 5.6 THOUSAND/UL (ref 4.5–11)

## 2021-09-14 PROCEDURE — 84450 TRANSFERASE (AST) (SGOT): CPT

## 2021-09-14 PROCEDURE — 84460 ALANINE AMINO (ALT) (SGPT): CPT

## 2021-09-14 PROCEDURE — 85025 COMPLETE CBC W/AUTO DIFF WBC: CPT

## 2021-09-14 PROCEDURE — 82565 ASSAY OF CREATININE: CPT

## 2021-09-21 ENCOUNTER — TELEPHONE (OUTPATIENT)
Dept: FAMILY MEDICINE CLINIC | Facility: CLINIC | Age: 46
End: 2021-09-21

## 2021-09-21 DIAGNOSIS — M06.9 RHEUMATOID ARTHRITIS, INVOLVING UNSPECIFIED SITE, UNSPECIFIED WHETHER RHEUMATOID FACTOR PRESENT (HCC): Primary | ICD-10-CM

## 2021-09-28 ENCOUNTER — EVALUATION (OUTPATIENT)
Dept: PHYSICAL THERAPY | Facility: REHABILITATION | Age: 46
End: 2021-09-28
Payer: COMMERCIAL

## 2021-09-28 DIAGNOSIS — M06.9 RHEUMATOID ARTHRITIS, INVOLVING UNSPECIFIED SITE, UNSPECIFIED WHETHER RHEUMATOID FACTOR PRESENT (HCC): ICD-10-CM

## 2021-09-28 PROCEDURE — 97162 PT EVAL MOD COMPLEX 30 MIN: CPT

## 2021-09-28 NOTE — PROGRESS NOTES
PT Evaluation     Today's date: 2021   Patient name: María Sheriff  : 1975  MRN: [id number]  Referring provider: Virginia Mendoza MD  Dx:   Encounter Diagnosis     ICD-10-CM    1  Rheumatoid arthritis, involving unspecified site, unspecified whether rheumatoid factor present ([facility] )  M06 9 Ambulatory referral to PT/OT hand therapy                  Assessment  Assessment details: Pt, María Sheriff , is a 39 y o  male presenting to physical therapy on this date for an initial evaluation  Upon eval on this date, pt presented with decreased wrist, hand, and finger ROM bilaterally, decreased wrist and gript strength bilaterally, and overall impaired tolerance to functional activities due to reports of pain and discomfort  Visible swelling was also observed on patient bilateral hands especially at his MCP and PIP joints  At this time, pt would benefit from skilled OP PT to work on deficits listed above and improve overall functional mobility with decreased reports of pain and compensation patterns   Thank you for this referral    Impairments: abnormal or restricted ROM, activity intolerance, impaired physical strength, lacks appropriate home exercise program and pain with function    Symptom irritability: high  Goals  STG:   Pt will report 25% reduction in pain in 3 weeks to demonstrate improved tolerance to functional activities   Pt will demonstrate 25% improvements in ROM in 4 weeks   Pt will be I with initial HEP to progress towards independence with exercise     LTG:   Pt will improve bilateral wrist ROM by 25% by d/c for increased ease and safety with ADLs and overall functional mobility   Pt will improve bilateral  strength by 5 kg in 6 weeks for increased ease and safety with functional mobility and activities   Pt will be I with modified/updated HEP and demonstrate ability to complete with confidence and proper mechanics/form to safely be d/c from skilled OP PT and continue with exercises on their own     Plan  Patient would benefit from: PT eval and skilled physical therapy  Planned modality interventions: cryotherapy and thermotherapy: hydrocollator packs  Planned therapy interventions: functional ROM exercises, home exercise program, therapeutic exercise, therapeutic activities, stretching, strengthening, patient education, neuromuscular re-education, manual therapy, joint mobilization, IADL retraining and body mechanics training  Frequency: 2x week  Duration in visits: 10  Duration in weeks: 5  Treatment plan discussed with: patient        Subjective Evaluation    History of Present Illness  Mechanism of injury: Pt reports that he has severe RA and he has pain all the time in his hands  Pt reports that the pain in his hands has been around for a long time  Pt reports that he has discomfort in his hands throughout every day and all ADLs and activities bother him  He reports that he is currently working as a  and does not notice pain in his hands when he is working but will notice the discomfort after  He reports that his L hand bothers him more than the R  He reports that he is R handed  Pt reports no n/t in his hands  He also reports pain and discomfort in his wrists    Pain  Pain scale: discomfort   Relieving factors: medications  Exacerbated by: gripping, lifting, hand movements     Treatments  Previous treatment: medication  Patient Goals  Patient goals for therapy: decreased pain, increased motion, increased strength, return to sport/leisure activities and independence with ADLs/IADLs  Patient goal: to see if we can reduce the swelling in my hands and reduce discomfort         Objective     Active Range of Motion     Left Wrist   Wrist flexion: 35 degrees   Wrist extension: 40 degrees   Radial deviation: 10 degrees   Ulnar deviation: 30 degrees     Right Wrist   Wrist flexion: 30 degrees with pain  Wrist extension: 35 degrees with pain  Radial deviation: 10 degrees   Ulnar deviation: 25 degrees     Additional Active Range of Motion Details  R thumb to pinky 2 cm from tip to tip   L thumb to pinky 2 5 cm from tip to tip     Strength/Myotome Testing     Left Wrist/Hand   Wrist extension: 3+  Wrist flexion: 3+  Radial deviation: 3+  Ulnar deviation: 3+    Right Wrist/Hand   Wrist extension: 3+  Wrist flexion: 3+  Radial deviation: 3+  Ulnar deviation: 3+    Additional Strength Details  R  strength:  10 kg  L  strength: 8 kg              Precautions: GERD, T2D      9/28            FOTO Perf            IE/RE IE              Manuals             B/L hand PROM ML                                                   Neuro Re-Ed                                                                                                        Ther Ex             UBE              Pulleys             Self MCP ROM             Wrist flex/ext stretch             Putty              Supination/Pronation                                        Ther Activity             Gripper             Therabar              Gait Training                                       Modalities             MHP Pre

## 2021-10-04 ENCOUNTER — OFFICE VISIT (OUTPATIENT)
Dept: PHYSICAL THERAPY | Facility: REHABILITATION | Age: 46
End: 2021-10-04
Payer: COMMERCIAL

## 2021-10-04 DIAGNOSIS — M06.9 RHEUMATOID ARTHRITIS, INVOLVING UNSPECIFIED SITE, UNSPECIFIED WHETHER RHEUMATOID FACTOR PRESENT (HCC): Primary | ICD-10-CM

## 2021-10-04 PROCEDURE — 97110 THERAPEUTIC EXERCISES: CPT

## 2021-10-04 PROCEDURE — 97530 THERAPEUTIC ACTIVITIES: CPT

## 2021-10-06 ENCOUNTER — OFFICE VISIT (OUTPATIENT)
Dept: PHYSICAL THERAPY | Facility: REHABILITATION | Age: 46
End: 2021-10-06
Payer: COMMERCIAL

## 2021-10-06 DIAGNOSIS — M06.9 RHEUMATOID ARTHRITIS, INVOLVING UNSPECIFIED SITE, UNSPECIFIED WHETHER RHEUMATOID FACTOR PRESENT (HCC): Primary | ICD-10-CM

## 2021-10-06 PROCEDURE — 97530 THERAPEUTIC ACTIVITIES: CPT

## 2021-10-06 PROCEDURE — 97110 THERAPEUTIC EXERCISES: CPT

## 2021-10-13 ENCOUNTER — APPOINTMENT (OUTPATIENT)
Dept: PHYSICAL THERAPY | Facility: REHABILITATION | Age: 46
End: 2021-10-13
Payer: COMMERCIAL

## 2021-10-18 ENCOUNTER — APPOINTMENT (OUTPATIENT)
Dept: PHYSICAL THERAPY | Facility: REHABILITATION | Age: 46
End: 2021-10-18
Payer: COMMERCIAL

## 2021-10-21 ENCOUNTER — OFFICE VISIT (OUTPATIENT)
Dept: FAMILY MEDICINE CLINIC | Facility: CLINIC | Age: 46
End: 2021-10-21

## 2021-10-21 VITALS
HEART RATE: 104 BPM | DIASTOLIC BLOOD PRESSURE: 88 MMHG | TEMPERATURE: 97.7 F | RESPIRATION RATE: 19 BRPM | WEIGHT: 177 LBS | SYSTOLIC BLOOD PRESSURE: 122 MMHG | OXYGEN SATURATION: 97 % | BODY MASS INDEX: 24.01 KG/M2

## 2021-10-21 DIAGNOSIS — M06.9 RHEUMATOID ARTHRITIS, INVOLVING UNSPECIFIED SITE, UNSPECIFIED WHETHER RHEUMATOID FACTOR PRESENT (HCC): ICD-10-CM

## 2021-10-21 DIAGNOSIS — M62.830 BACK MUSCLE SPASM: Primary | ICD-10-CM

## 2021-10-21 PROCEDURE — 99213 OFFICE O/P EST LOW 20 MIN: CPT | Performed by: FAMILY MEDICINE

## 2021-10-21 PROCEDURE — 1036F TOBACCO NON-USER: CPT | Performed by: FAMILY MEDICINE

## 2021-10-21 RX ORDER — ACETAMINOPHEN 500 MG
1000 TABLET ORAL EVERY 8 HOURS PRN
Qty: 90 TABLET | Refills: 3 | Status: SHIPPED | OUTPATIENT
Start: 2021-10-21

## 2021-10-21 RX ORDER — LIDOCAINE 50 MG/G
1 PATCH TOPICAL DAILY
Qty: 30 PATCH | Refills: 1 | Status: SHIPPED | OUTPATIENT
Start: 2021-10-21

## 2021-10-21 RX ORDER — CYCLOBENZAPRINE HCL 5 MG
5 TABLET ORAL
Qty: 30 TABLET | Refills: 0 | Status: SHIPPED | OUTPATIENT
Start: 2021-10-21 | End: 2022-03-29

## 2021-11-17 ENCOUNTER — VBI (OUTPATIENT)
Dept: ADMINISTRATIVE | Facility: OTHER | Age: 46
End: 2021-11-17

## 2022-01-04 PROBLEM — E78.1 HYPERTRIGLYCERIDEMIA: Status: ACTIVE | Noted: 2022-01-04

## 2022-01-04 NOTE — ASSESSMENT & PLAN NOTE
· Uncontrolled  · Home Blood Glucose Logs: not checking blood sugars at home; does have supplies but was not checking them since he thought they were outdated   · Last A1c-9 6 (9/2021),Goal A1C<7  · Today A1C : 10 8  · Last Micro/Cr: wnl  · Home medications: Metformin 1000 mg BID, Januvia 100 mg qd  · Last Diabetic Foot Exam: 6/2021  · Last Ophthalmologic Appointment: 6/2021  · Continue Metformin 1,000 mg BID, Januvia 100 mg qd  · Will start Pioglitazone 30 mg  Educated about the side effects of weight gain and edema  Very low risk for hypoglycemia and durable medication  He is not interested in starting Insulin at this time as he does not want too many injections and believes he can get his A1C down  Advised that if we do no see improvement in 3 months will have to seriously discuss starting Insulin  · Contemplating Diabetes CARES project  · Consult Nutrition  · Consult Diabetes Education  · Will follow up in 3 months  He is motivated to change as his A1C has never been this high  Advised to call if glucometer supplies not working

## 2022-01-04 NOTE — ASSESSMENT & PLAN NOTE
· TAG's 6/2021-438  · No treatment less than 500  · Likely secondary to not fasting, diabetes mellitus II   · Weight stale, BMI 23 73  ASCVD 10 year risk score: 4 8 5 - 7 5% low risk    Patient counseled on diet and exercise  Will order Lipid Panel Please fast 10-12 hours

## 2022-01-04 NOTE — PROGRESS NOTES
[facility]   [address], [address]  Phone#  [phone]  Fax#  [phone]    ASSESSMENT and PLAN  Type 2 diabetes mellitus (HCC)  · Uncontrolled  · Home Blood Glucose Logs: not checking blood sugars at home; does have supplies but was not checking them since he thought they were outdated   · Last A1c-9 6 (9/2021),Goal A1C<7  · Today A1C : 10 8  · Last Micro/Cr: wnl  · Home medications: Metformin 1000 mg BID, Januvia 100 mg qd  · Last Diabetic Foot Exam: 6/2021  · Last Ophthalmologic Appointment: 6/2021  · Continue Metformin 1,000 mg BID, Januvia 100 mg qd  · Will start Pioglitazone 30 mg  Educated about the side effects of weight gain and edema  Very low risk for hypoglycemia and durable medication  He is not interested in starting Insulin at this time as he does not want too many injections and believes he can get his A1C down  Advised that if we do no see improvement in 3 months will have to seriously discuss starting Insulin  · Contemplating Diabetes CARES project  · Consult Nutrition  · Consult Diabetes Education  · Will follow up in 3 months  He is motivated to change as his A1C has never been this high  Advised to call if glucometer supplies not working  Hypertriglyceridemia  · TAG's 6/2021-438  · No treatment less than 500  · Likely secondary to not fasting, diabetes mellitus II   · Weight stale, BMI 23 73  ASCVD 10 year risk score: 4 8 5 - 7 5% low risk  Patient counseled on diet and exercise  Will order Lipid Panel Please fast 10-12 hours    Diagnoses and all orders for this visit:    Type 2 diabetes mellitus without complication, without long-term current use of insulin (HCC)  -     POCT hemoglobin A1c  -     Cancel: Vitamin B12; Future  -     Ambulatory referral to Nutrition Services; Future  -     pioglitazone (ACTOS) 30 mg tablet;  Take 1 tablet (30 mg total) by mouth daily  -     Ambulatory referral to Diabetic Education; Future    Hypertriglyceridemia  -     Lipid Panel with Direct LDL reflex; Future        HISTORY OF PRESENT ILLNESS  Jamie Hancock  is a 42 y o  male with a significant PMHx of Type II DM, RA who presents to the clinic for management of their chronic medical conditions  Patient's medical conditions are stable unless noted otherwise above  Patient has not had any recent hospitalizations, or medical emergencies since last visit  Patient has no further complaints other than what is mentioned in the ROS  REVIEW OF SYSTEMS  Review of Systems   Constitutional: Negative for chills, fatigue, fever and unexpected weight change  HENT: Negative for congestion, rhinorrhea, sinus pressure and sore throat  Eyes: Negative for visual disturbance  Respiratory: Negative for cough, chest tightness, shortness of breath and wheezing  Cardiovascular: Negative for chest pain  Gastrointestinal: Negative for abdominal distention, abdominal pain, blood in stool, constipation, diarrhea, nausea and vomiting  Genitourinary: Negative for difficulty urinating, dysuria, frequency, hematuria and urgency  Musculoskeletal: Positive for arthralgias  Negative for back pain and neck pain  Skin: Negative for rash  Allergic/Immunologic: Negative  Neurological: Negative for dizziness, weakness, light-headedness, numbness and headaches  Psychiatric/Behavioral: Negative for behavioral problems  PAST MEDICAL HISTORY   Past Medical History:   Diagnosis Date    Abscess of lower leg 2020    Management per cellulitis above  No past surgical history on file    Social History     Socioeconomic History    Marital status: Single     Spouse name: Not on file    Number of children: Not on file    Years of education: Not on file    Highest education level: Not on file   Occupational History    Not on file   Tobacco Use    Smoking status: Former Smoker     Quit date: 2013     Years since quittin 3    Smokeless tobacco: Never Used   Substance and Sexual Activity    Alcohol use: Yes    Drug use: Yes     Types: Marijuana    Sexual activity: Not on file   Other Topics Concern    Not on file   Social History Narrative    Not on file     Social Determinants of Health     Financial Resource Strain: Not on file   Food Insecurity: Not on file   Transportation Needs: Not on file   Physical Activity: Not on file   Stress: Not on file   Social Connections: Not on file   Intimate Partner Violence: Not on file   Housing Stability: Not on file     No family history on file      MEDICATIONS    Current Outpatient Medications:     acetaminophen (TYLENOL) 500 mg tablet, Take 2 tablets (1,000 mg total) by mouth every 8 (eight) hours as needed for mild pain, Disp: 90 tablet, Rfl: 3    Adalimumab 40 MG/0 8ML PNKT, Inject 40 mg under the skin, Disp: , Rfl:     ALPRAZolam (XANAX) 2 MG tablet, 3 (three) times a day, Disp: , Rfl:     cyclobenzaprine (FLEXERIL) 5 mg tablet, Take 1 tablet (5 mg total) by mouth daily at bedtime, Disp: 30 tablet, Rfl: 0    Diclofenac Sodium (VOLTAREN) 1 %, Apply 2 g topically 4 (four) times a day, Disp: 50 g, Rfl: 2    HUMIRA PEN 40 MG/0 4ML PNKT, , Disp: , Rfl:     hydrOXYzine HCL (ATARAX) 25 mg tablet, , Disp: , Rfl:     hydrOXYzine HCL (ATARAX) 50 mg tablet, , Disp: , Rfl:     hydrOXYzine pamoate (VISTARIL) 50 mg capsule, , Disp: , Rfl:     Januvia 100 MG tablet, take 1 tablet by mouth once daily, Disp: 90 tablet, Rfl: 1    leflunomide (ARAVA) 20 MG tablet, Take 20 mg by mouth daily, Disp: , Rfl: 0    lidocaine (Lidoderm) 5 %, Apply 1 patch topically daily Remove & Discard patch within 12 hours or as directed by MD, Disp: 30 patch, Rfl: 1    loratadine (CLARITIN) 10 mg tablet, Take 1 tablet (10 mg total) by mouth daily, Disp: 100 tablet, Rfl: 3    meloxicam (MOBIC) 15 mg tablet, take 1 tablet by mouth once daily, Disp: 30 tablet, Rfl: 2    metFORMIN (GLUCOPHAGE) 1000 MG tablet, Take 1 tablet (1,000 mg total) by mouth 2 (two) times a day with meals, Disp: 180 tablet, Rfl: 1    pioglitazone (ACTOS) 30 mg tablet, Take 1 tablet (30 mg total) by mouth daily, Disp: 90 tablet, Rfl: 0    propranolol (INDERAL) 10 mg tablet, , Disp: , Rfl:     QUEtiapine (SEROQUEL) 200 mg tablet, daily as needed , Disp: , Rfl:     QUEtiapine (SEROquel) 300 mg tablet, Take by mouth daily as needed, Disp: , Rfl:     PHYSICAL EXAM  Vitals:    01/05/22 1542 01/05/22 1725   BP: 118/60    BP Location: Left arm    Patient Position: Sitting    Cuff Size: Standard    Pulse: (!) 123 99   Resp: 16    Temp: (!) 96 °F (35 6 °C)    TempSrc: Temporal    SpO2: 96%    Weight: 79 4 kg (175 lb)    Height: 6' (1 829 m)      Wt Readings from Last 3 Encounters:   01/05/22 79 4 kg (175 lb)   10/21/21 80 3 kg (177 lb)   09/08/21 81 6 kg (180 lb)    , Body mass index is 23 73 kg/m²  Physical Exam  Vitals and nursing note reviewed  Constitutional:       General: He is not in acute distress  Appearance: He is well-developed  He is not toxic-appearing  HENT:      Head: Normocephalic and atraumatic  Eyes:      Extraocular Movements: Extraocular movements intact  Pupils: Pupils are equal, round, and reactive to light  Cardiovascular:      Rate and Rhythm: Normal rate and regular rhythm  Heart sounds: Normal heart sounds  No murmur heard  Pulmonary:      Effort: Pulmonary effort is normal  No respiratory distress  Breath sounds: Normal breath sounds  No wheezing, rhonchi or rales  Abdominal:      General: Bowel sounds are normal  There is no distension  Palpations: Abdomen is soft  Tenderness: There is no abdominal tenderness  There is no guarding  Musculoskeletal:         General: Normal range of motion  Cervical back: Normal range of motion and neck supple  Skin:     General: Skin is warm and dry  Neurological:      Mental Status: He is alert and oriented to person, place, and time     Psychiatric: Behavior: Behavior normal        LABS/IMAGING  Hemoglobin A1C   Date Value Ref Range Status   01/05/2022 10 8 (A) 6 5 Final   06/09/2021 9 2 (H) Normal 3 8-5 6%; PreDiabetic 5 7-6 4%; Diabetic >=6 5%; Glycemic control for adults with diabetes <7 0% % Final     HDL, Direct   Date Value Ref Range Status   06/09/2021 30 (L) >=40 mg/dL Final     Comment:     HDL Cholesterol:       Low     <41 mg/dL  Specimen collection should occur prior to Metamizole administration due to the potential for falsley depressed results  Triglycerides   Date Value Ref Range Status   06/09/2021 438 (H) <150 mg/dL Final     Comment:     Triglyceride:     Normal          <150 mg/dl     Borderline High 150-199 mg/dl     High            200-499 mg/dl        Very High       >499 mg/dl    Specimen collection should occur prior to N-Acetylcysteine or Metamizole administration due to the potential for falsely depressed results        WBC   Date Value Ref Range Status   01/05/2022 6 20 4 50 - 11 00 Thousand/uL Final   09/14/2021 5 60 4 50 - 11 00 Thousand/uL Final   04/16/2021 4 90 4 50 - 11 00 Thousand/uL Final   06/07/2018 6 8 4 5 - 11 0 K/MCL Final     Hemoglobin   Date Value Ref Range Status   01/05/2022 13 5 13 5 - 17 5 g/dL Final   09/14/2021 13 7 13 5 - 17 5 g/dL Final   04/16/2021 12 7 (L) 13 5 - 17 5 g/dL Final   06/07/2018 13 5 13 5 - 17 5 G/DL Final     Platelets   Date Value Ref Range Status   01/05/2022 349 150 - 450 Thousands/uL Final   09/14/2021 238 150 - 450 Thousands/uL Final   04/16/2021 271 150 - 450 Thousands/uL Final   06/07/2018 264 150 - 450 K/MCL Final     Potassium   Date Value Ref Range Status   01/05/2021 4 0 3 6 - 5 0 mmol/L Final   07/28/2020 3 8 3 6 - 5 0 mmol/L Final   02/22/2019 3 7 3 6 - 5 0 mmol/L Final     Chloride   Date Value Ref Range Status   01/05/2021 105 97 - 108 mmol/L Final   07/28/2020 104 97 - 108 mmol/L Final   02/22/2019 100 97 - 108 mmol/L Final     CO2   Date Value Ref Range Status 01/05/2021 28 22 - 30 mmol/L Final   07/28/2020 27 22 - 30 mmol/L Final   02/22/2019 28 22 - 30 mmol/L Final     BUN   Date Value Ref Range Status   01/05/2021 9 5 - 25 mg/dL Final   07/28/2020 8 5 - 25 mg/dL Final   02/22/2019 8 5 - 25 mg/dL Final     Creatinine   Date Value Ref Range Status   09/14/2021 0 59 (L) 0 70 - 1 50 mg/dL Final     Comment:     Standardized to IDMS reference method   04/16/2021 0 73 0 70 - 1 50 mg/dL Final     Comment:     Standardized to IDMS reference method   01/05/2021 0 56 (L) 0 70 - 1 50 mg/dL Final     Comment:     Standardized to IDMS reference method     eGFR   Date Value Ref Range Status   09/14/2021 124 >60 ml/min/1 73sq m Final   04/16/2021 114 >60 ml/min/1 73sq m Final   01/05/2021 127 >60 ml/min/1 73sq m Final     Calcium   Date Value Ref Range Status   01/05/2021 9 3 8 4 - 10 2 mg/dL Final   07/28/2020 9 3 8 4 - 10 2 mg/dL Final   02/22/2019 10 0 8 4 - 10 2 mg/dL Final     AST   Date Value Ref Range Status   09/14/2021 24 17 - 59 U/L Final     Comment:     Specimen collection should occur prior to Sulfasalazine administration due to the potential for falsely depressed results  04/16/2021 21 17 - 59 U/L Final     Comment:     Specimen collection should occur prior to Sulfasalazine administration due to the potential for falsely depressed results  01/05/2021 26 17 - 59 U/L Final     Comment:     Specimen collection should occur prior to Sulfasalazine administration due to the potential for falsely depressed results  06/07/2018 12 (L) 17 - 59 U/L Final     ALT   Date Value Ref Range Status   09/14/2021 20 <50 U/L Final   04/16/2021 24 <50 U/L Final   01/05/2021 22 9 - 52 U/L Final     Comment:     Specimen collection should occur prior to Sulfasalazine administration due to the potential for falsely depressed results      06/07/2018 25 9 - 52 U/L Final     Alkaline Phosphatase   Date Value Ref Range Status   01/05/2021 77 43 - 122 U/L Final   07/28/2020 87 43 - 122 U/L Final   02/22/2019 98 43 - 122 U/L Final     Magnesium   Date Value Ref Range Status   02/22/2019 1 9 1 6 - 2 3 mg/dL Final     No results found for: TSH    This note has been dictated using [employer] software  It may contain errors, including improperly dictated words  Please contact physician directly for any questions       Sulma Ruiz MD   PGY-2

## 2022-01-05 ENCOUNTER — APPOINTMENT (OUTPATIENT)
Dept: LAB | Facility: HOSPITAL | Age: 47
End: 2022-01-05
Payer: MEDICARE

## 2022-01-05 ENCOUNTER — OFFICE VISIT (OUTPATIENT)
Dept: FAMILY MEDICINE CLINIC | Facility: CLINIC | Age: 47
End: 2022-01-05

## 2022-01-05 VITALS
WEIGHT: 175 LBS | BODY MASS INDEX: 23.7 KG/M2 | SYSTOLIC BLOOD PRESSURE: 118 MMHG | OXYGEN SATURATION: 96 % | TEMPERATURE: 96 F | DIASTOLIC BLOOD PRESSURE: 60 MMHG | HEIGHT: 72 IN | HEART RATE: 99 BPM | RESPIRATION RATE: 16 BRPM

## 2022-01-05 DIAGNOSIS — E11.9 TYPE 2 DIABETES MELLITUS WITHOUT COMPLICATION, WITHOUT LONG-TERM CURRENT USE OF INSULIN (HCC): Primary | ICD-10-CM

## 2022-01-05 DIAGNOSIS — E78.1 HYPERTRIGLYCERIDEMIA: ICD-10-CM

## 2022-01-05 DIAGNOSIS — M05.79 RHEUMATOID ARTHRITIS INVOLVING MULTIPLE SITES WITH POSITIVE RHEUMATOID FACTOR (HCC): ICD-10-CM

## 2022-01-05 DIAGNOSIS — Z51.81 ENCOUNTER FOR MONITORING LEFLUNOMIDE THERAPY: ICD-10-CM

## 2022-01-05 DIAGNOSIS — Z79.899 ENCOUNTER FOR MONITORING LEFLUNOMIDE THERAPY: ICD-10-CM

## 2022-01-05 LAB
ALBUMIN SERPL BCP-MCNC: 3.5 G/DL (ref 3–5.2)
ALT SERPL W P-5'-P-CCNC: 15 U/L
AST SERPL W P-5'-P-CCNC: 13 U/L (ref 17–59)
BASOPHILS # BLD AUTO: 0 THOUSANDS/ΜL (ref 0–0.1)
BASOPHILS NFR BLD AUTO: 1 % (ref 0–1)
CREAT SERPL-MCNC: 0.67 MG/DL (ref 0.7–1.5)
EOSINOPHIL # BLD AUTO: 0.1 THOUSAND/ΜL (ref 0–0.4)
EOSINOPHIL NFR BLD AUTO: 2 % (ref 0–6)
ERYTHROCYTE [DISTWIDTH] IN BLOOD BY AUTOMATED COUNT: 13.2 %
ERYTHROCYTE [SEDIMENTATION RATE] IN BLOOD: 70 MM/HOUR (ref 0–14)
GFR SERPL CREATININE-BSD FRML MDRD: 116 ML/MIN/1.73SQ M
HCT VFR BLD AUTO: 39.6 % (ref 41–53)
HGB BLD-MCNC: 13.5 G/DL (ref 13.5–17.5)
LYMPHOCYTES # BLD AUTO: 0.8 THOUSANDS/ΜL (ref 0.5–4)
LYMPHOCYTES NFR BLD AUTO: 14 % (ref 25–45)
MCH RBC QN AUTO: 31.2 PG (ref 26–34)
MCHC RBC AUTO-ENTMCNC: 34.1 G/DL (ref 31–36)
MCV RBC AUTO: 92 FL (ref 80–100)
MONOCYTES # BLD AUTO: 0.5 THOUSAND/ΜL (ref 0.2–0.9)
MONOCYTES NFR BLD AUTO: 8 % (ref 1–10)
NEUTROPHILS # BLD AUTO: 4.7 THOUSANDS/ΜL (ref 1.8–7.8)
NEUTS SEG NFR BLD AUTO: 75 % (ref 45–65)
PLATELET # BLD AUTO: 349 THOUSANDS/UL (ref 150–450)
PMV BLD AUTO: 8.7 FL (ref 8.9–12.7)
RBC # BLD AUTO: 4.32 MILLION/UL (ref 4.5–5.9)
SL AMB POCT HEMOGLOBIN AIC: 10.8 (ref ?–6.5)
WBC # BLD AUTO: 6.2 THOUSAND/UL (ref 4.5–11)

## 2022-01-05 PROCEDURE — 84450 TRANSFERASE (AST) (SGOT): CPT

## 2022-01-05 PROCEDURE — 84460 ALANINE AMINO (ALT) (SGPT): CPT

## 2022-01-05 PROCEDURE — 85652 RBC SED RATE AUTOMATED: CPT

## 2022-01-05 PROCEDURE — 82565 ASSAY OF CREATININE: CPT

## 2022-01-05 PROCEDURE — 85025 COMPLETE CBC W/AUTO DIFF WBC: CPT

## 2022-01-05 PROCEDURE — 83036 HEMOGLOBIN GLYCOSYLATED A1C: CPT | Performed by: FAMILY MEDICINE

## 2022-01-05 PROCEDURE — 82040 ASSAY OF SERUM ALBUMIN: CPT

## 2022-01-05 PROCEDURE — 36415 COLL VENOUS BLD VENIPUNCTURE: CPT

## 2022-01-05 PROCEDURE — 99213 OFFICE O/P EST LOW 20 MIN: CPT | Performed by: FAMILY MEDICINE

## 2022-01-05 RX ORDER — PIOGLITAZONEHYDROCHLORIDE 30 MG/1
30 TABLET ORAL DAILY
Qty: 90 TABLET | Refills: 0 | Status: SHIPPED | OUTPATIENT
Start: 2022-01-05 | End: 2022-04-04

## 2022-03-14 ENCOUNTER — OFFICE VISIT (OUTPATIENT)
Dept: FAMILY MEDICINE CLINIC | Facility: CLINIC | Age: 47
End: 2022-03-14

## 2022-03-14 VITALS
DIASTOLIC BLOOD PRESSURE: 82 MMHG | RESPIRATION RATE: 16 BRPM | SYSTOLIC BLOOD PRESSURE: 124 MMHG | HEART RATE: 121 BPM | OXYGEN SATURATION: 99 % | TEMPERATURE: 97.8 F | WEIGHT: 175 LBS | BODY MASS INDEX: 23.19 KG/M2 | HEIGHT: 73 IN

## 2022-03-14 DIAGNOSIS — K04.7 DENTAL ABSCESS: Primary | ICD-10-CM

## 2022-03-14 PROCEDURE — 99213 OFFICE O/P EST LOW 20 MIN: CPT | Performed by: INTERNAL MEDICINE

## 2022-03-14 RX ORDER — CLINDAMYCIN HYDROCHLORIDE 300 MG/1
300 CAPSULE ORAL 4 TIMES DAILY
Qty: 40 CAPSULE | Refills: 0 | Status: SHIPPED | OUTPATIENT
Start: 2022-03-14 | End: 2022-03-24

## 2022-03-14 NOTE — PROGRESS NOTES
Assessment/Plan:    Dental abscess  As evidences by swelling of soft palate with well demarcated borders  Patient reports recently finishing course of Amoxacillin after dental extraction     - Prescribed Clindamycin 300 mg QID x 10 days   - Advised patient to follow up with dentist immediately  - Strict ED precautions provided       No follow-ups on file  Diagnoses and all orders for this visit:    Dental abscess  -     clindamycin (CLEOCIN) 300 MG capsule; Take 1 capsule (300 mg total) by mouth 4 (four) times a day for 10 days          Subjective:     Floridalma Cleary  is a 42 y o  male who  has a past medical history of Abscess of lower leg  who presented to the office today for pain of right cheek  HPI    Patient reports 2 weeks prior he had a dental extraction and was taking a course of Amoxacillin which he finished several days ago  Reports 5 days ago he began feeling pain on the right cheek with radiation to the back of his head  Pain has been progressively worsening  Denies HA, nausea, vomiting, diarrhea, chest pain, dyspnea or stridor  Review of Systems   Constitutional: Negative for chills and fever  HENT: Negative for congestion, rhinorrhea and sinus pressure  Respiratory: Negative for chest tightness, shortness of breath, wheezing and stridor  Cardiovascular: Negative for chest pain and palpitations  Gastrointestinal: Negative for abdominal pain, nausea and vomiting  Endocrine: Negative for polyuria  Genitourinary: Negative for difficulty urinating, dysuria, frequency and urgency  Musculoskeletal: Negative for myalgias  Neurological: Negative for dizziness, syncope and light-headedness  Psychiatric/Behavioral: Negative for dysphoric mood           Objective:    /82 (BP Location: Right arm, Patient Position: Sitting, Cuff Size: Standard)   Pulse (!) 121   Temp 97 8 °F (36 6 °C) (Temporal)   Resp 16   Ht 6' 1" (1 854 m)   Wt 79 4 kg (175 lb)   SpO2 99%   BMI 23 09 kg/m²     Physical Exam  Constitutional:       Appearance: Normal appearance  HENT:      Head: Normocephalic and atraumatic  Comments: Pain on palpation of right buccinator  Abscess noted on right soft palate - please see image below  Eyes:      Conjunctiva/sclera: Conjunctivae normal    Cardiovascular:      Rate and Rhythm: Normal rate and regular rhythm  Pulses: Normal pulses  Heart sounds: Normal heart sounds  Pulmonary:      Effort: Pulmonary effort is normal       Breath sounds: Normal breath sounds  Abdominal:      General: There is no distension  Musculoskeletal:         General: Normal range of motion  Cervical back: Normal range of motion and neck supple  Neurological:      Mental Status: He is alert and oriented to person, place, and time  Psychiatric:         Behavior: Behavior normal          Thought Content:  Thought content normal              Rayo Henry MD  03/14/22  1:53 PM

## 2022-03-14 NOTE — ASSESSMENT & PLAN NOTE
As evidences by swelling of soft palate with well demarcated borders  Patient reports recently finishing course of Amoxacillin after dental extraction     - Prescribed Clindamycin 300 mg QID x 10 days   - Advised patient to follow up with dentist immediately  - Strict ED precautions provided

## 2022-03-25 ENCOUNTER — OFFICE VISIT (OUTPATIENT)
Dept: FAMILY MEDICINE CLINIC | Facility: CLINIC | Age: 47
End: 2022-03-25

## 2022-03-25 VITALS
BODY MASS INDEX: 23.19 KG/M2 | WEIGHT: 175 LBS | DIASTOLIC BLOOD PRESSURE: 76 MMHG | SYSTOLIC BLOOD PRESSURE: 114 MMHG | RESPIRATION RATE: 18 BRPM | TEMPERATURE: 97.2 F | HEIGHT: 73 IN | OXYGEN SATURATION: 97 % | HEART RATE: 114 BPM

## 2022-03-25 DIAGNOSIS — Z02.4 DRIVER'S PERMIT PE (PHYSICAL EXAMINATION): Primary | ICD-10-CM

## 2022-03-25 PROCEDURE — 99213 OFFICE O/P EST LOW 20 MIN: CPT

## 2022-03-25 NOTE — PROGRESS NOTES
Assessment/Plan:    's permit PE (physical examination)  Lab Results   Component Value Date    HGBA1C 10 8 (A) 01/05/2022       Here today for a 's permit physical   Denies any history of seizures, neuro dx,  neuropsych dx, syncope, cardiac issues, htn, drug/ alcohol abuse, diabetes, physical disability and any conditions that cause a lapse of consciousness       Recommend he improve his diabetes control  Will complete 's physical as pt is asymptomatic, despite uncontrolled diabetes  Diagnoses and all orders for this visit:    's permit PE (physical examination)          Subjective:      Patient ID: Hemanth Nguyen  is a 42 y o  male  Hemanth Nguyen  is a 42 y o  male  Who presents today for a 's physical  Denies any acute complaints  The following portions of the patient's history were reviewed and updated as appropriate: allergies, current medications, past family history, past medical history, past social history, past surgical history and problem list     Review of Systems   Constitutional: Negative for chills and fever  HENT: Negative for ear pain and sore throat  Eyes: Negative for pain and visual disturbance  Respiratory: Negative for cough and shortness of breath  Cardiovascular: Negative for chest pain and palpitations  Gastrointestinal: Negative for abdominal pain and vomiting  Genitourinary: Negative for dysuria and hematuria  Musculoskeletal: Negative for arthralgias and back pain  Skin: Negative for color change and rash  Neurological: Negative for seizures and syncope  All other systems reviewed and are negative  Objective:      /76 (BP Location: Left arm, Patient Position: Sitting, Cuff Size: Adult)   Pulse (!) 114   Temp (!) 97 2 °F (36 2 °C) (Temporal)   Resp 18   Ht 6' 1" (1 854 m)   Wt 79 4 kg (175 lb)   SpO2 97%   BMI 23 09 kg/m²          Physical Exam  Vitals and nursing note reviewed     Constitutional: General: He is not in acute distress  Appearance: He is not ill-appearing, toxic-appearing or diaphoretic  HENT:      Head: Normocephalic and atraumatic  Right Ear: External ear normal       Left Ear: External ear normal       Nose: Nose normal    Eyes:      Conjunctiva/sclera: Conjunctivae normal    Cardiovascular:      Rate and Rhythm: Normal rate  Heart sounds: Normal heart sounds  Pulmonary:      Effort: Pulmonary effort is normal    Musculoskeletal:         General: Normal range of motion  Cervical back: Normal range of motion and neck supple  Skin:     General: Skin is warm and dry  Neurological:      Mental Status: He is alert and oriented to person, place, and time  Mental status is at baseline  Psychiatric:         Mood and Affect: Mood normal          Behavior: Behavior normal          Thought Content: Thought content normal  Thought content does not include homicidal or suicidal ideation  Thought content does not include homicidal or suicidal plan           Judgment: Judgment normal           Visual Acuity Screening    Right eye Left eye Both eyes   Without correction: 20/30 20/30 20/30   With correction:

## 2022-03-25 NOTE — ASSESSMENT & PLAN NOTE
Lab Results   Component Value Date    HGBA1C 10 8 (A) 01/05/2022       Here today for a 's permit physical   Denies any history of seizures, neuro dx,  neuropsych dx, syncope, cardiac issues, htn, drug/ alcohol abuse, diabetes, physical disability and any conditions that cause a lapse of consciousness       Recommend he improve his diabetes control  Will complete 's physical as pt is asymptomatic, despite uncontrolled diabetes

## 2022-03-29 ENCOUNTER — OFFICE VISIT (OUTPATIENT)
Dept: FAMILY MEDICINE CLINIC | Facility: CLINIC | Age: 47
End: 2022-03-29

## 2022-03-29 VITALS
HEART RATE: 120 BPM | OXYGEN SATURATION: 98 % | TEMPERATURE: 98 F | BODY MASS INDEX: 22.8 KG/M2 | HEIGHT: 73 IN | RESPIRATION RATE: 16 BRPM | WEIGHT: 172 LBS | DIASTOLIC BLOOD PRESSURE: 90 MMHG | SYSTOLIC BLOOD PRESSURE: 130 MMHG

## 2022-03-29 DIAGNOSIS — M54.50 ACUTE LEFT-SIDED LOW BACK PAIN WITHOUT SCIATICA: Primary | ICD-10-CM

## 2022-03-29 PROCEDURE — 99214 OFFICE O/P EST MOD 30 MIN: CPT | Performed by: NURSE PRACTITIONER

## 2022-03-29 RX ORDER — BACLOFEN 20 MG/1
20 TABLET ORAL 3 TIMES DAILY
Qty: 90 TABLET | Refills: 0 | Status: SHIPPED | OUTPATIENT
Start: 2022-03-29

## 2022-03-29 RX ORDER — PREDNISONE 20 MG/1
TABLET ORAL
Qty: 10 TABLET | Refills: 0 | Status: SHIPPED | OUTPATIENT
Start: 2022-03-29 | End: 2022-04-04

## 2022-03-29 NOTE — ASSESSMENT & PLAN NOTE
· Steroid burst due to his reported severity (10/10)  · Strong recommendation to PT  · Discussed importance of maintaining daily activity and avoiding sedentary lifestyle which can worsen back pain  Demonstrated ROM exercises, stretches, and core strengthening with patient  Advised patient to use warm compresses or warm shower prior to stretching  Discussed proper body mechanics (bending and lifting techniques) to prevent further injury  Weight loss can improve low back symptoms

## 2022-03-30 ENCOUNTER — TELEPHONE (OUTPATIENT)
Dept: FAMILY MEDICINE CLINIC | Facility: CLINIC | Age: 47
End: 2022-03-30

## 2022-03-30 NOTE — PROGRESS NOTES
Assessment/Plan:    Problem List Items Addressed This Visit        Other    Acute left-sided low back pain without sciatica - Primary     · Steroid burst due to his reported severity (10/10)  · Strong recommendation to PT  · Discussed importance of maintaining daily activity and avoiding sedentary lifestyle which can worsen back pain  Demonstrated ROM exercises, stretches, and core strengthening with patient  Advised patient to use warm compresses or warm shower prior to stretching  Discussed proper body mechanics (bending and lifting techniques) to prevent further injury  Weight loss can improve low back symptoms  Relevant Medications    predniSONE 20 mg tablet    baclofen 20 mg tablet    Other Relevant Orders    Ambulatory Referral to Pain Management            No follow-ups on file  A chart review was performed and previous primary care visit notes were reviewed  All applicable imaging studies were reviewed and images were reviewed personally  All applicable laboratory studies were reviewed personally  Care everywhere review was performed if  available and all pertinent notes were reviewed  Subjective:     HPI: Suzanna Beltran  is a 42 y o  male who  has a past medical history of Abscess of lower leg  who presented to the office today for Same-Day Care visit for lower back pain  States this is an acute exacerbation of lower back pain which he previously had only intermittently for self-limiting episodes  States today he needs "a really strong pain medication"  States it is difficult for him to sleep  Curiously he has not tried any medication at home to help with his pain      The following portions of the patient's history were reviewed and updated as appropriate: allergies, current medications, past family history, past medical history, past social history, past surgical history, and problem list     Current Outpatient Medications on File Prior to Visit   Medication Sig Dispense Refill  acetaminophen (TYLENOL) 500 mg tablet Take 2 tablets (1,000 mg total) by mouth every 8 (eight) hours as needed for mild pain 90 tablet 3    Adalimumab 40 MG/0 8ML PNKT Inject 40 mg under the skin      ALPRAZolam (XANAX) 2 MG tablet 3 (three) times a day      Diclofenac Sodium (VOLTAREN) 1 % Apply 2 g topically 4 (four) times a day 50 g 2    HUMIRA PEN 40 MG/0 4ML PNKT       hydrOXYzine HCL (ATARAX) 25 mg tablet       hydrOXYzine HCL (ATARAX) 50 mg tablet       hydrOXYzine pamoate (VISTARIL) 50 mg capsule       Januvia 100 MG tablet take 1 tablet by mouth once daily 90 tablet 1    leflunomide (ARAVA) 20 MG tablet Take 20 mg by mouth daily  0    lidocaine (Lidoderm) 5 % Apply 1 patch topically daily Remove & Discard patch within 12 hours or as directed by MD 30 patch 1    loratadine (CLARITIN) 10 mg tablet Take 1 tablet (10 mg total) by mouth daily 100 tablet 3    meloxicam (MOBIC) 15 mg tablet take 1 tablet by mouth once daily 30 tablet 2    metFORMIN (GLUCOPHAGE) 1000 MG tablet Take 1 tablet (1,000 mg total) by mouth 2 (two) times a day with meals 180 tablet 1    pioglitazone (ACTOS) 30 mg tablet Take 1 tablet (30 mg total) by mouth daily 90 tablet 0    propranolol (INDERAL) 10 mg tablet       QUEtiapine (SEROQUEL) 200 mg tablet daily as needed       QUEtiapine (SEROquel) 300 mg tablet Take by mouth daily as needed       No current facility-administered medications on file prior to visit  Review of Systems   Constitutional: Negative  HENT: Negative  Eyes: Negative  Respiratory: Negative  Cardiovascular: Negative  Gastrointestinal: Negative  Genitourinary: Negative  Musculoskeletal: Positive for back pain  Neurological: Negative  Psychiatric/Behavioral: Negative                Objective:    /90 (BP Location: Left arm, Patient Position: Sitting, Cuff Size: Large)   Pulse (!) 120   Temp 98 °F (36 7 °C) (Temporal)   Resp 16   Ht 6' 1" (1 854 m)   Wt 78 kg (172 lb)   SpO2 98%   BMI 22 69 kg/m²     Physical Exam  Constitutional:       Appearance: Normal appearance  He is normal weight  HENT:      Head: Normocephalic  Right Ear: External ear normal       Left Ear: External ear normal       Nose: Nose normal       Mouth/Throat:      Mouth: Mucous membranes are moist    Eyes:      Extraocular Movements: Extraocular movements intact  Conjunctiva/sclera: Conjunctivae normal    Cardiovascular:      Rate and Rhythm: Normal rate and regular rhythm  Pulses: Normal pulses  Heart sounds: Normal heart sounds  Pulmonary:      Effort: Pulmonary effort is normal       Breath sounds: Normal breath sounds  Musculoskeletal:         General: No signs of injury  Cervical back: Normal range of motion  Lumbar back: Spasms and tenderness present  Decreased range of motion  Negative right straight leg raise test and negative left straight leg raise test       Right lower leg: No edema  Left lower leg: No edema  Skin:     General: Skin is warm and dry  Capillary Refill: Capillary refill takes less than 2 seconds  Findings: No bruising, lesion or rash  Neurological:      General: No focal deficit present  Mental Status: He is alert and oriented to person, place, and time  Psychiatric:         Mood and Affect: Mood normal          Behavior: Behavior normal          Thought Content: Thought content normal          HAZEL Fragoso  03/30/22  3:48 PM    There are no Patient Instructions on file for this visit

## 2022-03-30 NOTE — TELEPHONE ENCOUNTER
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

## 2022-04-04 ENCOUNTER — CONSULT (OUTPATIENT)
Dept: FAMILY MEDICINE CLINIC | Facility: CLINIC | Age: 47
End: 2022-04-04

## 2022-04-04 VITALS
HEART RATE: 88 BPM | HEIGHT: 73 IN | SYSTOLIC BLOOD PRESSURE: 128 MMHG | OXYGEN SATURATION: 99 % | DIASTOLIC BLOOD PRESSURE: 76 MMHG | RESPIRATION RATE: 18 BRPM | WEIGHT: 169.8 LBS | BODY MASS INDEX: 22.5 KG/M2 | TEMPERATURE: 98.3 F

## 2022-04-04 DIAGNOSIS — E11.9 TYPE 2 DIABETES MELLITUS WITHOUT COMPLICATION, WITHOUT LONG-TERM CURRENT USE OF INSULIN (HCC): Primary | ICD-10-CM

## 2022-04-04 DIAGNOSIS — Z01.818 PREOP EXAMINATION: ICD-10-CM

## 2022-04-04 DIAGNOSIS — E11.8 TYPE 2 DIABETES MELLITUS WITH COMPLICATION, WITHOUT LONG-TERM CURRENT USE OF INSULIN (HCC): ICD-10-CM

## 2022-04-04 DIAGNOSIS — M54.50 ACUTE LEFT-SIDED LOW BACK PAIN WITHOUT SCIATICA: ICD-10-CM

## 2022-04-04 DIAGNOSIS — M05.79 RHEUMATOID ARTHRITIS INVOLVING MULTIPLE SITES WITH POSITIVE RHEUMATOID FACTOR (HCC): ICD-10-CM

## 2022-04-04 LAB — SL AMB POCT HEMOGLOBIN AIC: 11 (ref ?–6.5)

## 2022-04-04 PROCEDURE — 93000 ELECTROCARDIOGRAM COMPLETE: CPT | Performed by: FAMILY MEDICINE

## 2022-04-04 PROCEDURE — 83036 HEMOGLOBIN GLYCOSYLATED A1C: CPT | Performed by: FAMILY MEDICINE

## 2022-04-04 PROCEDURE — 99214 OFFICE O/P EST MOD 30 MIN: CPT | Performed by: FAMILY MEDICINE

## 2022-04-04 NOTE — PROGRESS NOTES
FAMILY PRACTICE PRE-OPERATIVE EVALUATION  [facility] PHYSICIAN GROUP [facility]    NAME: Marge Casanova  AGE: 42 y o  SEX: male  : 1975     DATE: 2022    [facility] Practice Pre-Operative Evaluation      Chief Complaint: Pre-operative Evaluation     Surgery: excision/possible trigger left long finger mass   Anticipated Date of Surgery: 2022  Referring Provider: Self, Referral       History of Present Illness:     Marge Casanova  is a 42 y o  male who presents to the office today for a preoperative consultation at the request of surgeon, Jackie Yang MD, who plans on performing excision/possible trigger left long finger mass on 2022  Planned anesthesia is unclear at this time, patient reports they are giving him the choice, and he might want to fall asleep  Patient has a bleeding risk of: no recent abnormal bleeding  Patient does not have objections to receiving blood products if needed  Current anti-platelet/anti-coagulation medications that the patient is prescribed includes: none  Patient does however has a history of extensive rheumatoid arthritis and is currently leflunomide  He follows with rheumatology and they are are starting him on tociclizumab, he is awaiting to receive the prescription        Assessment of Chronic Conditions:   - Diabetes Mellitus: not well controlled, on januvia 100mg, pioglitazone 30mg,  and metformin 1g BID       Assessment of Cardiac Risk:  · Denies unstable or severe angina or MI in the last 6 weeks or history of stent placement in the last year   · Denies decompensated heart failure (e g  New onset heart failure, NYHA functional class IV heart failure, or worsening existing heart failure)  · Denies significant arrhythmias such as high grade AV block, symptomatic ventricular arrhythmia, newly recognized ventricular tachycardia, supraventricular tachycardia with resting heart rate >100, or symptomatic bradycardia  · Denies severe heart valve disease including aortic stenosis or symptomatic mitral stenosis     Exercise Capacity:  · Able to walk 4 blocks without symptoms?: No, has to stop due to ankle pain and back pain  · Able to walk 2 flights without symptoms?: No,  has to stop due to ankle pain and back pain      Prior Anesthesia Reactions: No     Personal history of venous thromboembolic disease? No    History of steroid use for >2 weeks within last year? No         Review of Systems:     Review of Systems   Constitutional: Negative for activity change, appetite change, chills, fatigue, fever and unexpected weight change  Eyes: Negative for pain and visual disturbance  Respiratory: Negative for cough and shortness of breath  Cardiovascular: Negative for chest pain and palpitations  Gastrointestinal: Negative for abdominal pain and vomiting  Genitourinary: Negative for dysuria and hematuria  Musculoskeletal: Positive for back pain and myalgias  Negative for arthralgias, joint swelling, neck pain and neck stiffness  Skin: Negative for color change and rash  Neurological: Negative for seizures and syncope  All other systems reviewed and are negative        Current Problem List:     Patient Active Problem List   Diagnosis    Rheumatoid arthritis ([facility] )    Type 2 diabetes mellitus ([facility] )    Foot pain, bilateral    GERD (gastroesophageal reflux disease)    Venous stasis dermatitis of left lower extremity    Environmental allergies    Elbow laceration, left, initial encounter    Back muscle spasm    Hypertriglyceridemia    Dental abscess    's permit PE (physical examination)    Acute left-sided low back pain without sciatica       Allergies:     No Known Allergies    Current Medications:       Current Outpatient Medications:     acetaminophen (TYLENOL) 500 mg tablet, Take 2 tablets (1,000 mg total) by mouth every 8 (eight) hours as needed for mild pain, Disp: 90 tablet, Rfl: 3    ALPRAZolam (XANAX) 2 MG tablet, 3 (three) times a day, Disp: , Rfl:     baclofen 20 mg tablet, Take 1 tablet (20 mg total) by mouth 3 (three) times a day, Disp: 90 tablet, Rfl: 0    Diclofenac Sodium (VOLTAREN) 1 %, Apply 2 g topically 4 (four) times a day, Disp: 50 g, Rfl: 2    Januvia 100 MG tablet, take 1 tablet by mouth once daily, Disp: 90 tablet, Rfl: 1    leflunomide (ARAVA) 20 MG tablet, Take 20 mg by mouth daily, Disp: , Rfl: 0    lidocaine (Lidoderm) 5 %, Apply 1 patch topically daily Remove & Discard patch within 12 hours or as directed by MD, Disp: 30 patch, Rfl: 1    loratadine (CLARITIN) 10 mg tablet, Take 1 tablet (10 mg total) by mouth daily, Disp: 100 tablet, Rfl: 3    meloxicam (MOBIC) 15 mg tablet, take 1 tablet by mouth once daily, Disp: 30 tablet, Rfl: 2    metFORMIN (GLUCOPHAGE) 1000 MG tablet, Take 1 tablet (1,000 mg total) by mouth 2 (two) times a day with meals, Disp: 180 tablet, Rfl: 1    pioglitazone (ACTOS) 30 mg tablet, Take 1 tablet (30 mg total) by mouth daily, Disp: 90 tablet, Rfl: 0    predniSONE 20 mg tablet, Take 3 tablets (60 mg total) by mouth daily for 2 days, THEN 2 tablets (40 mg total) daily for 2 days, THEN 1 tablet (20 mg total) daily for 2 days  , Disp: 10 tablet, Rfl: 0    propranolol (INDERAL) 10 mg tablet, , Disp: , Rfl:     QUEtiapine (SEROQUEL) 200 mg tablet, daily as needed , Disp: , Rfl:     QUEtiapine (SEROquel) 300 mg tablet, Take by mouth daily as needed, Disp: , Rfl:     Past Medical History:       Past Medical History:   Diagnosis Date    Abscess of lower leg 8/23/2020    Management per cellulitis above  No past surgical history on file  No family history on file       Social History     Socioeconomic History    Marital status: Single     Spouse name: Not on file    Number of children: Not on file    Years of education: Not on file    Highest education level: Not on file   Occupational History    Not on file   Tobacco Use    Smoking status: Former Smoker     Quit date: 2013     Years since quittin 6    Smokeless tobacco: Never Used   Substance and Sexual Activity    Alcohol use: Yes    Drug use: Yes     Types: Marijuana    Sexual activity: Not on file   Other Topics Concern    Not on file   Social History Narrative    Not on file     Social Determinants of Health     Financial Resource Strain: Unknown    Difficulty of Paying Living Expenses: Patient refused   Food Insecurity: Unknown    Worried About Running Out of Food in the Last Year: Patient refused    Ran Out of Food in the Last Year: Patient refused   Transportation Needs: Unknown    Lack of Transportation (Medical): Patient refused    Lack of Transportation (Non-Medical): Patient refused   Physical Activity: Not on file   Stress: Not on file   Social Connections: Not on file   Intimate Partner Violence: Not on file   Housing Stability: Not on file        Physical Exam:     /76 (BP Location: Right arm, Patient Position: Sitting, Cuff Size: Standard)   Pulse 88   Temp 98 3 °F (36 8 °C) (Temporal)   Resp 18   Ht 6' 1" (1 854 m)   Wt 77 kg (169 lb 12 8 oz)   SpO2 99%   BMI 22 40 kg/m²     Physical Exam  Constitutional:       Appearance: He is well-developed  HENT:      Head: Normocephalic and atraumatic  Cardiovascular:      Rate and Rhythm: Normal rate  Heart sounds: Normal heart sounds  No murmur heard  No friction rub  No gallop  Pulmonary:      Effort: Pulmonary effort is normal  No respiratory distress  Breath sounds: Normal breath sounds  Abdominal:      General: Bowel sounds are normal       Palpations: Abdomen is soft  Musculoskeletal:         General: Tenderness (legt middle finger) and deformity (fingers) present  No swelling or signs of injury  Normal range of motion  Cervical back: Neck supple  Right lower leg: No edema  Neurological:      Mental Status: He is alert and oriented to person, place, and time                Data: Pre-operative work-up    Laboratory Results: I have personally reviewed the pertinent laboratory results/reports      EKG:  normal sinus with no ST segment abnormalities     Chest x-ray: no new CXR, last CXR was in 2019, no abnormality found      Previous cardiopulmonary studies within the past year:  · Echocardiogram: n/a  · Cardiac Catheterization: n/a  · Stress Test: n/a  · Pulmonary Function Testing: n/a      Assessment & Recommendations:     1  Type 2 diabetes mellitus without complication, without long-term current use of insulin (HCC)  POCT hemoglobin A1c       Pre-Op Evaluation Assessment  42 y o  male with planned surgery:    excision/possible trigger left long finger mass   Known risk factors for perioperative complications: Diabetes mellitus  rheumatoid arthritis   Current medications which may produce withdrawal symptoms if withheld perioperatively: none  Pre-Op Evaluation Plan  1  Further preoperative workup as follows:   - None; no further preoperative work-up is required    2  Medication Management/Recommendations:   - None, continue medication regimen including morning of surgery, with sip of water    3  Prophylaxis for cardiac events with perioperative beta-blockers: should be considered, specific regimen per anesthesia  4  Patient requires further consultation with: None and continue to f/up with rheumatology afterward      5: will send a few dose of tramadol for his back pain   6: Return to the office in 2 weeks for adjustment of DM medication     Clearance  This moderate risk patient could continue forward with his low risk procedure  His diabetes is not well controlled  Risk of delay healing discussed with him   His primary surgery team could decide if they would like to move forward with this procedure      Karyna Salazar MD  [address], [address] [place] [address]  [address] [id number]  Phone#  [phone]  Fax#  [phone]

## 2022-04-05 ENCOUNTER — TELEPHONE (OUTPATIENT)
Dept: FAMILY MEDICINE CLINIC | Facility: CLINIC | Age: 47
End: 2022-04-05

## 2022-04-05 PROBLEM — E11.8 TYPE 2 DIABETES MELLITUS WITH COMPLICATION, WITHOUT LONG-TERM CURRENT USE OF INSULIN (HCC): Status: ACTIVE | Noted: 2018-09-11

## 2022-04-05 RX ORDER — TRAMADOL HYDROCHLORIDE 50 MG/1
50 TABLET ORAL EVERY 6 HOURS PRN
Qty: 15 TABLET | Refills: 0 | Status: SHIPPED | OUTPATIENT
Start: 2022-04-05

## 2022-04-05 NOTE — TELEPHONE ENCOUNTER
PreOp Exam Requesting Medical Clearance For Surgery forms fax to [phone], Confirmation received

## 2022-05-04 ENCOUNTER — OFFICE VISIT (OUTPATIENT)
Dept: FAMILY MEDICINE CLINIC | Facility: CLINIC | Age: 47
End: 2022-05-04

## 2022-05-04 VITALS
WEIGHT: 172 LBS | DIASTOLIC BLOOD PRESSURE: 70 MMHG | HEIGHT: 73 IN | HEART RATE: 95 BPM | BODY MASS INDEX: 22.8 KG/M2 | SYSTOLIC BLOOD PRESSURE: 114 MMHG | RESPIRATION RATE: 16 BRPM | TEMPERATURE: 97 F | OXYGEN SATURATION: 96 %

## 2022-05-04 DIAGNOSIS — E11.8 TYPE 2 DIABETES MELLITUS WITH COMPLICATION, WITHOUT LONG-TERM CURRENT USE OF INSULIN (HCC): Primary | ICD-10-CM

## 2022-05-04 PROCEDURE — 99213 OFFICE O/P EST LOW 20 MIN: CPT | Performed by: FAMILY MEDICINE

## 2022-05-04 NOTE — ASSESSMENT & PLAN NOTE
· Uncontrolled  ·  A1C 4/4/22 : 11 Last A1c-9 6 (9/2021),Goal A1C<7  · Last Micro/Cr: wnl  · Home medications: Metformin 1000 mg BID, Januvia 100 mg qd, Pioglitazone 30 mg   · Last Diabetic Foot Exam: 6/2021  · Last Ophthalmologic Appointment: 6/2021  · Continue Metformin 1,000 mg BID, Januvia 100 mg qd, Pioglitazone  · Patient is unsure he wants to be on Insulin at this point  Advised patient that it is necessary that we change up his regiment  He is admitting to drinking a lot of juices that can spike his blood sugars  Went over extensively which foods and drinks to utilize  He will join a gym and alter his diet   · Agreed to Diabetes CARES program   Consent not sign due to timing  · Recommend at [address] [place] visit to start with a GLP-1 agonist such as Ozempic if approved by [facility]  It is a once/week injection  Would also discontinue Januvia 100 mg  Can recheck A1C and see if there is improvement vs worsening  If improvement can stick with above plan   If worsening; would recommend starting Insulin

## 2022-05-04 NOTE — PROGRESS NOTES
[facility]   [address] [facility], [address] [facility]  Phone#  [phone]  Fax#  [phone]    ASSESSMENT and PLAN  Type 2 diabetes mellitus with complication, without long-term current use of insulin ([facility])  · Uncontrolled  ·  A1C 4/4/22 : 11 Last A1c-9 6 (9/2021),Goal A1C<7  · Last Micro/Cr: wnl  · Home medications: Metformin 1000 mg BID, Januvia 100 mg qd, Pioglitazone 30 mg   · Last Diabetic Foot Exam: 6/2021  · Last Ophthalmologic Appointment: 6/2021  · Continue Metformin 1,000 mg BID, Januvia 100 mg qd, Pioglitazone  · Patient is unsure he wants to be on Insulin at this point  Advised patient that it is necessary that we change up his regiment  He is admitting to drinking a lot of juices that can spike his blood sugars  Went over extensively which foods and drinks to utilize  He will join a gym and alter his diet   · Agreed to Diabetes CARES program   Consent not sign due to timing  · Recommend at [address] visit to start with a GLP-1 agonist such as Ozempic if approved by [employer]  It is a once/week injection  Would also discontinue Januvia 100 mg  Can recheck A1C and see if there is improvement vs worsening  If improvement can stick with above plan  If worsening; would recommend starting Insulin    Diagnoses and all orders for this visit:    Type 2 diabetes mellitus with complication, without long-term current use of insulin ([facility] [address] )      HISTORY OF PRESENT ILLNESS  Emily Del Real  is a 42 y o  male with a significant PMHx of DMII, GERD, RA who presents to the clinic for  management of their chronic medical conditions  Patient's medical conditions are stable unless noted otherwise above  Patient has not had any recent hospitalizations, or medical emergencies since last visit  Patient has no further complaints other than what is mentioned in the ROS  REVIEW OF SYSTEMS  Review of Systems   Constitutional: Negative for chills, fatigue, fever and unexpected weight change  HENT: Negative for congestion, rhinorrhea, sinus pressure and sore throat  Eyes: Negative for visual disturbance  Respiratory: Negative for cough, chest tightness, shortness of breath and wheezing  Cardiovascular: Negative for chest pain  Gastrointestinal: Negative for abdominal distention, abdominal pain, blood in stool, constipation, diarrhea, nausea and vomiting  Genitourinary: Negative for difficulty urinating, dysuria, frequency, hematuria and urgency  Musculoskeletal: Negative for back pain and neck pain  Skin: Negative for rash  Allergic/Immunologic: Negative  Neurological: Negative for dizziness, weakness, light-headedness, numbness and headaches  Psychiatric/Behavioral: Negative for behavioral problems  PAST MEDICAL HISTORY   Past Medical History:   Diagnosis Date    Abscess of lower leg 2020    Management per cellulitis above  No past surgical history on file  Social History     Socioeconomic History    Marital status: Single     Spouse name: Not on file    Number of children: Not on file    Years of education: Not on file    Highest education level: Not on file   Occupational History    Not on file   Tobacco Use    Smoking status: Former Smoker     Quit date: 2013     Years since quittin 6    Smokeless tobacco: Never Used   Substance and Sexual Activity    Alcohol use:  Yes    Drug use: Yes     Types: Marijuana    Sexual activity: Not on file   Other Topics Concern    Not on file   Social History Narrative    Not on file     Social Determinants of Health     Financial Resource Strain: Unknown    Difficulty of Paying Living Expenses: Patient refused   Food Insecurity: Unknown    Worried About Running Out of Food in the Last Year: Patient refused    [address] in the Last Year: Patient refused   Transportation Needs: Unknown    Lack of Transportation (Medical): Patient refused    Lack of Transportation (Non-Medical): Patient refused Physical Activity: Not on file   Stress: Not on file   Social Connections: Not on file   Intimate Partner Violence: Not on file   Housing Stability: Not on file     No family history on file      MEDICATIONS    Current Outpatient Medications:     acetaminophen (TYLENOL) 500 mg tablet, Take 2 tablets (1,000 mg total) by mouth every 8 (eight) hours as needed for mild pain, Disp: 90 tablet, Rfl: 3    baclofen 20 mg tablet, Take 1 tablet (20 mg total) by mouth 3 (three) times a day, Disp: 90 tablet, Rfl: 0    Diclofenac Sodium (VOLTAREN) 1 %, Apply 2 g topically 4 (four) times a day, Disp: 50 g, Rfl: 2    Januvia 100 MG tablet, take 1 tablet by mouth once daily, Disp: 90 tablet, Rfl: 1    leflunomide (ARAVA) 20 MG tablet, Take 20 mg by mouth daily, Disp: , Rfl: 0    lidocaine (Lidoderm) 5 %, Apply 1 patch topically daily Remove & Discard patch within 12 hours or as directed by MD, Disp: 30 patch, Rfl: 1    loratadine (CLARITIN) 10 mg tablet, Take 1 tablet (10 mg total) by mouth daily, Disp: 100 tablet, Rfl: 3    meloxicam (MOBIC) 15 mg tablet, take 1 tablet by mouth once daily, Disp: 30 tablet, Rfl: 2    metFORMIN (GLUCOPHAGE) 1000 MG tablet, Take 1 tablet (1,000 mg total) by mouth 2 (two) times a day with meals, Disp: 180 tablet, Rfl: 1    pioglitazone (ACTOS) 30 mg tablet, take 1 tablet by mouth once daily, Disp: 90 tablet, Rfl: 0    propranolol (INDERAL) 10 mg tablet, , Disp: , Rfl:     QUEtiapine (SEROQUEL) 200 mg tablet, daily as needed , Disp: , Rfl:     QUEtiapine (SEROquel) 300 mg tablet, Take by mouth daily as needed, Disp: , Rfl:     traMADol (ULTRAM) 50 mg tablet, Take 1 tablet (50 mg total) by mouth every 6 (six) hours as needed for moderate pain, Disp: 15 tablet, Rfl: 0    PHYSICAL EXAM  Vitals:    05/04/22 1456   BP: 114/70   BP Location: Left arm   Patient Position: Sitting   Cuff Size: Standard   Pulse: 95   Resp: 16   Temp: (!) 97 °F (36 1 °C)   TempSrc: Temporal   SpO2: 96%   Weight: 78 kg (172 lb)   Height: 6' 1" (1 854 m)     Wt Readings from Last 3 Encounters:   05/04/22 78 kg (172 lb)   04/04/22 77 kg (169 lb 12 8 oz)   03/29/22 78 kg (172 lb)    , Body mass index is 22 69 kg/m²  Physical Exam  Vitals and nursing note reviewed  Constitutional:       General: He is not in acute distress  Appearance: He is well-developed  He is not toxic-appearing  HENT:      Head: Normocephalic and atraumatic  Eyes:      Extraocular Movements: Extraocular movements intact  Pupils: Pupils are equal, round, and reactive to light  Cardiovascular:      Rate and Rhythm: Normal rate and regular rhythm  Heart sounds: Normal heart sounds  No murmur heard  Pulmonary:      Effort: Pulmonary effort is normal  No respiratory distress  Breath sounds: Normal breath sounds  No wheezing, rhonchi or rales  Abdominal:      General: Bowel sounds are normal  There is no distension  Palpations: Abdomen is soft  Tenderness: There is no abdominal tenderness  There is no guarding  Musculoskeletal:         General: Normal range of motion  Cervical back: Normal range of motion and neck supple  Skin:     General: Skin is warm and dry  Neurological:      Mental Status: He is alert and oriented to person, place, and time  Psychiatric:         Behavior: Behavior normal        LABS/IMAGING  Hemoglobin A1C   Date Value Ref Range Status   04/04/2022 11 0 (A) 6 5 Final   06/09/2021 9 2 (H) Normal 3 8-5 6%; PreDiabetic 5 7-6 4%; Diabetic >=6 5%; Glycemic control for adults with diabetes <7 0% % Final     HDL, Direct   Date Value Ref Range Status   06/09/2021 30 (L) >=40 mg/dL Final     Comment:     HDL Cholesterol:       Low     <41 mg/dL  Specimen collection should occur prior to Metamizole administration due to the potential for falsley depressed results       Triglycerides   Date Value Ref Range Status   06/09/2021 438 (H) <150 mg/dL Final     Comment:     Triglyceride:     Normal <150 mg/dl     Borderline High 150-199 mg/dl     High            200-499 mg/dl        Very High       >499 mg/dl    Specimen collection should occur prior to N-Acetylcysteine or Metamizole administration due to the potential for falsely depressed results        WBC   Date Value Ref Range Status   01/05/2022 6 20 4 50 - 11 00 Thousand/uL Final   09/14/2021 5 60 4 50 - 11 00 Thousand/uL Final   04/16/2021 4 90 4 50 - 11 00 Thousand/uL Final   06/07/2018 6 8 4 5 - 11 0 K/MCL Final     Hemoglobin   Date Value Ref Range Status   01/05/2022 13 5 13 5 - 17 5 g/dL Final   09/14/2021 13 7 13 5 - 17 5 g/dL Final   04/16/2021 12 7 (L) 13 5 - 17 5 g/dL Final   06/07/2018 13 5 13 5 - 17 5 G/DL Final     Platelets   Date Value Ref Range Status   01/05/2022 349 150 - 450 Thousands/uL Final   09/14/2021 238 150 - 450 Thousands/uL Final   04/16/2021 271 150 - 450 Thousands/uL Final   06/07/2018 264 150 - 450 K/MCL Final     Potassium   Date Value Ref Range Status   01/05/2021 4 0 3 6 - 5 0 mmol/L Final   07/28/2020 3 8 3 6 - 5 0 mmol/L Final   02/22/2019 3 7 3 6 - 5 0 mmol/L Final     Chloride   Date Value Ref Range Status   01/05/2021 105 97 - 108 mmol/L Final   07/28/2020 104 97 - 108 mmol/L Final   02/22/2019 100 97 - 108 mmol/L Final     CO2   Date Value Ref Range Status   01/05/2021 28 22 - 30 mmol/L Final   07/28/2020 27 22 - 30 mmol/L Final   02/22/2019 28 22 - 30 mmol/L Final     BUN   Date Value Ref Range Status   01/05/2021 9 5 - 25 mg/dL Final   07/28/2020 8 5 - 25 mg/dL Final   02/22/2019 8 5 - 25 mg/dL Final     Creatinine   Date Value Ref Range Status   01/05/2022 0 67 (L) 0 70 - 1 50 mg/dL Final     Comment:     Standardized to IDMS reference method   09/14/2021 0 59 (L) 0 70 - 1 50 mg/dL Final     Comment:     Standardized to IDMS reference method   04/16/2021 0 73 0 70 - 1 50 mg/dL Final     Comment:     Standardized to IDMS reference method     eGFR   Date Value Ref Range Status   01/05/2022 116 ml/min/1 73sq m Final   09/14/2021 124 >60 ml/min/1 73sq m Final   04/16/2021 114 >60 ml/min/1 73sq m Final     Calcium   Date Value Ref Range Status   01/05/2021 9 3 8 4 - 10 2 mg/dL Final   07/28/2020 9 3 8 4 - 10 2 mg/dL Final   02/22/2019 10 0 8 4 - 10 2 mg/dL Final     AST   Date Value Ref Range Status   01/05/2022 13 (L) 17 - 59 U/L Final     Comment:     Specimen collection should occur prior to Sulfasalazine administration due to the potential for falsely depressed results  09/14/2021 24 17 - 59 U/L Final     Comment:     Specimen collection should occur prior to Sulfasalazine administration due to the potential for falsely depressed results  04/16/2021 21 17 - 59 U/L Final     Comment:     Specimen collection should occur prior to Sulfasalazine administration due to the potential for falsely depressed results  06/07/2018 12 (L) 17 - 59 U/L Final     ALT   Date Value Ref Range Status   01/05/2022 15 <50 U/L Final   09/14/2021 20 <50 U/L Final   04/16/2021 24 <50 U/L Final   06/07/2018 25 9 - 52 U/L Final     Alkaline Phosphatase   Date Value Ref Range Status   01/05/2021 77 43 - 122 U/L Final   07/28/2020 87 43 - 122 U/L Final   02/22/2019 98 43 - 122 U/L Final     Magnesium   Date Value Ref Range Status   02/22/2019 1 9 1 6 - 2 3 mg/dL Final     No results found for: TSH    This note has been dictated using [employer] software  It may contain errors, including improperly dictated words  Please contact physician directly for any questions       Naty Mcclain MD   PGY-2

## 2022-05-23 DIAGNOSIS — E11.8 TYPE 2 DIABETES MELLITUS WITH COMPLICATION, WITHOUT LONG-TERM CURRENT USE OF INSULIN (HCC): ICD-10-CM

## 2022-05-23 DIAGNOSIS — E11.9 TYPE 2 DIABETES MELLITUS WITHOUT COMPLICATION, WITHOUT LONG-TERM CURRENT USE OF INSULIN (HCC): ICD-10-CM

## 2022-05-23 RX ORDER — SITAGLIPTIN 100 MG/1
TABLET, FILM COATED ORAL
Qty: 90 TABLET | Refills: 1 | Status: SHIPPED | OUTPATIENT
Start: 2022-05-23

## 2022-06-06 ENCOUNTER — TELEPHONE (OUTPATIENT)
Dept: FAMILY MEDICINE CLINIC | Facility: CLINIC | Age: 47
End: 2022-06-06

## 2022-06-06 NOTE — TELEPHONE ENCOUNTER
Hello!  I am covering Dr Amor Cristina messages  It looks like his note stated that he recommended starting the patient on Ozempic when he is seen for his DM Cares visit- does not appear these changes were made during their last office visit  How high are his blood sugars? If they are very high, would recommend he be seen in the office sooner than his next appointment to discuss medication changes  Thank you!

## 2022-06-06 NOTE — TELEPHONE ENCOUNTER
Pt contacted office stating he was informed to receive a new medication for his DM  Pt stated he hasn't received nothing yet and his BS has been high  Pt does not remember exactly the name of the medication  Please advice

## 2022-06-07 ENCOUNTER — OFFICE VISIT (OUTPATIENT)
Dept: FAMILY MEDICINE CLINIC | Facility: CLINIC | Age: 47
End: 2022-06-07

## 2022-06-07 VITALS
WEIGHT: 169 LBS | TEMPERATURE: 98.7 F | SYSTOLIC BLOOD PRESSURE: 138 MMHG | RESPIRATION RATE: 16 BRPM | HEIGHT: 73 IN | DIASTOLIC BLOOD PRESSURE: 90 MMHG | HEART RATE: 109 BPM | OXYGEN SATURATION: 97 % | BODY MASS INDEX: 22.4 KG/M2

## 2022-06-07 DIAGNOSIS — E11.8 TYPE 2 DIABETES MELLITUS WITH COMPLICATION, WITHOUT LONG-TERM CURRENT USE OF INSULIN (HCC): Primary | ICD-10-CM

## 2022-06-07 PROCEDURE — 99214 OFFICE O/P EST MOD 30 MIN: CPT | Performed by: NURSE PRACTITIONER

## 2022-06-07 RX ORDER — BLOOD SUGAR DIAGNOSTIC
STRIP MISCELLANEOUS
Qty: 100 STRIP | Refills: 5 | Status: SHIPPED | OUTPATIENT
Start: 2022-06-07

## 2022-06-07 RX ORDER — LANCETS
EACH MISCELLANEOUS 2 TIMES DAILY
Qty: 100 EACH | Refills: 5 | Status: SHIPPED | OUTPATIENT
Start: 2022-06-07

## 2022-06-07 RX ORDER — BLOOD-GLUCOSE METER
EACH MISCELLANEOUS 2 TIMES DAILY
Qty: 1 KIT | Refills: 0 | Status: SHIPPED | OUTPATIENT
Start: 2022-06-07

## 2022-06-07 NOTE — PROGRESS NOTES
Assessment/Plan:    Jamie was seen today for hyperglycemia  Diagnoses and all orders for this visit:    Type 2 diabetes mellitus with complication, without long-term current use of insulin (HCC)  -     Accu-Chek FastClix Lancets MISC; Use 2 (two) times a day  -     glucose blood (Accu-Chek Guide) test strip; Use as instructed  -     Blood Glucose Monitoring Suppl (Accu-Chek Guide) w/Device KIT; Use 2 (two) times a day    Recommend starting to checking fasting glucose levels before breakfast and before dinner  Provided him with a book to record levels and encouraged him to bring to next visit  Return for Next scheduled follow up  Subjective:     Raghu Gomez  is a 42 y o  male who  has a past medical history of Abscess of lower leg  who presented to the office today for same day visit  He reports that he is having elevated glucose levels  He is not checking glucose readings at home, does not have a meter  Reports that less than one hour before coming in today he ate fried chicken wings and "a German appetizer"  Glucose in office 244         The following portions of the patient's history were reviewed and updated as appropriate: allergies, current medications, past family history, past medical history, past social history, past surgical history and problem list     Current Outpatient Medications on File Prior to Visit   Medication Sig Dispense Refill    acetaminophen (TYLENOL) 500 mg tablet Take 2 tablets (1,000 mg total) by mouth every 8 (eight) hours as needed for mild pain 90 tablet 3    baclofen 20 mg tablet Take 1 tablet (20 mg total) by mouth 3 (three) times a day 90 tablet 0    Diclofenac Sodium (VOLTAREN) 1 % Apply 2 g topically 4 (four) times a day 50 g 2    Januvia 100 MG tablet take 1 tablet by mouth once daily 90 tablet 1    leflunomide (ARAVA) 20 MG tablet Take 20 mg by mouth daily  0    lidocaine (Lidoderm) 5 % Apply 1 patch topically daily Remove & Discard patch within 12 hours or as directed by MD 30 patch 1    loratadine (CLARITIN) 10 mg tablet Take 1 tablet (10 mg total) by mouth daily 100 tablet 3    meloxicam (MOBIC) 15 mg tablet take 1 tablet by mouth once daily 30 tablet 2    metFORMIN (GLUCOPHAGE) 1000 MG tablet take 1 tablet by mouth twice a day with meals 180 tablet 1    pioglitazone (ACTOS) 30 mg tablet take 1 tablet by mouth once daily 90 tablet 0    propranolol (INDERAL) 10 mg tablet       QUEtiapine (SEROQUEL) 200 mg tablet daily as needed       QUEtiapine (SEROquel) 300 mg tablet Take by mouth daily as needed      traMADol (ULTRAM) 50 mg tablet Take 1 tablet (50 mg total) by mouth every 6 (six) hours as needed for moderate pain 15 tablet 0     No current facility-administered medications on file prior to visit  Review of Systems   Constitutional: Negative for chills and fever  HENT: Negative for ear pain and sore throat  Eyes: Negative for pain and visual disturbance  Respiratory: Negative for cough and shortness of breath  Cardiovascular: Negative for chest pain and palpitations  Gastrointestinal: Negative for abdominal pain and vomiting  Genitourinary: Negative for dysuria and hematuria  Skin: Negative for color change and rash  Neurological: Negative for dizziness, seizures, syncope and headaches  All other systems reviewed and are negative  Objective:    /90 (BP Location: Right arm, Patient Position: Sitting, Cuff Size: Standard)   Pulse (!) 109   Temp 98 7 °F (37 1 °C) (Temporal)   Resp 16   Ht 6' 1" (1 854 m)   Wt 76 7 kg (169 lb)   SpO2 97%   BMI 22 30 kg/m²     Physical Exam  Vitals and nursing note reviewed  Constitutional:       General: He is not in acute distress  Appearance: He is well-developed  He is not diaphoretic  HENT:      Head: Normocephalic and atraumatic        Right Ear: External ear normal       Left Ear: External ear normal    Cardiovascular:      Rate and Rhythm: Normal rate and regular rhythm  Pulmonary:      Effort: Pulmonary effort is normal  No respiratory distress  Breath sounds: Normal breath sounds  No wheezing  Musculoskeletal:      Cervical back: Normal range of motion and neck supple  Right lower leg: No edema  Left lower leg: No edema  Lymphadenopathy:      Cervical: No cervical adenopathy  Skin:     General: Skin is warm and dry  Capillary Refill: Capillary refill takes less than 2 seconds  Neurological:      Mental Status: He is alert and oriented to person, place, and time  Psychiatric:         Mood and Affect: Mood is anxious           HAZEL May  06/07/22  4:11 PM

## 2022-06-21 ENCOUNTER — TELEPHONE (OUTPATIENT)
Dept: OBGYN CLINIC | Facility: CLINIC | Age: 47
End: 2022-06-21

## 2022-06-28 ENCOUNTER — APPOINTMENT (OUTPATIENT)
Dept: LAB | Facility: HOSPITAL | Age: 47
End: 2022-06-28
Payer: MEDICARE

## 2022-06-28 DIAGNOSIS — E11.9 TYPE 2 DIABETES MELLITUS WITHOUT COMPLICATION, WITHOUT LONG-TERM CURRENT USE OF INSULIN (HCC): ICD-10-CM

## 2022-06-28 DIAGNOSIS — M05.79 RHEUMATOID ARTHRITIS INVOLVING MULTIPLE SITES WITH POSITIVE RHEUMATOID FACTOR (HCC): ICD-10-CM

## 2022-06-28 LAB
ALBUMIN SERPL BCP-MCNC: 4.2 G/DL (ref 3–5.2)
ALT SERPL W P-5'-P-CCNC: 19 U/L
AST SERPL W P-5'-P-CCNC: 16 U/L (ref 17–59)
BASOPHILS # BLD AUTO: 0.02 THOUSANDS/ΜL (ref 0–0.1)
BASOPHILS NFR BLD AUTO: 0 % (ref 0–1)
CREAT SERPL-MCNC: 0.61 MG/DL (ref 0.7–1.5)
EOSINOPHIL # BLD AUTO: 0.11 THOUSAND/ΜL (ref 0–0.61)
EOSINOPHIL NFR BLD AUTO: 2 % (ref 0–6)
ERYTHROCYTE [DISTWIDTH] IN BLOOD BY AUTOMATED COUNT: 13.8 % (ref 11.6–15.1)
ERYTHROCYTE [SEDIMENTATION RATE] IN BLOOD: 62 MM/HOUR (ref 0–14)
GFR SERPL CREATININE-BSD FRML MDRD: 121 ML/MIN/1.73SQ M
HCT VFR BLD AUTO: 39.6 % (ref 36.5–49.3)
HGB BLD-MCNC: 12.7 G/DL (ref 12–17)
IMM GRANULOCYTES # BLD AUTO: 0.03 THOUSAND/UL (ref 0–0.2)
IMM GRANULOCYTES NFR BLD AUTO: 1 % (ref 0–2)
LYMPHOCYTES # BLD AUTO: 1.13 THOUSANDS/ΜL (ref 0.6–4.47)
LYMPHOCYTES NFR BLD AUTO: 18 % (ref 14–44)
MCH RBC QN AUTO: 29.5 PG (ref 26.8–34.3)
MCHC RBC AUTO-ENTMCNC: 32.1 G/DL (ref 31.4–37.4)
MCV RBC AUTO: 92 FL (ref 82–98)
MONOCYTES # BLD AUTO: 0.48 THOUSAND/ΜL (ref 0.17–1.22)
MONOCYTES NFR BLD AUTO: 8 % (ref 4–12)
NEUTROPHILS # BLD AUTO: 4.42 THOUSANDS/ΜL (ref 1.85–7.62)
NEUTS SEG NFR BLD AUTO: 71 % (ref 43–75)
NRBC BLD AUTO-RTO: 0 /100 WBCS
PLATELET # BLD AUTO: 322 THOUSANDS/UL (ref 149–390)
PMV BLD AUTO: 10 FL (ref 8.9–12.7)
RBC # BLD AUTO: 4.31 MILLION/UL (ref 3.88–5.62)
WBC # BLD AUTO: 6.19 THOUSAND/UL (ref 4.31–10.16)

## 2022-06-28 PROCEDURE — 36415 COLL VENOUS BLD VENIPUNCTURE: CPT

## 2022-06-28 PROCEDURE — 84450 TRANSFERASE (AST) (SGOT): CPT

## 2022-06-28 PROCEDURE — 84460 ALANINE AMINO (ALT) (SGPT): CPT

## 2022-06-28 PROCEDURE — 82040 ASSAY OF SERUM ALBUMIN: CPT

## 2022-06-28 PROCEDURE — 85652 RBC SED RATE AUTOMATED: CPT

## 2022-06-28 PROCEDURE — 85025 COMPLETE CBC W/AUTO DIFF WBC: CPT

## 2022-06-28 PROCEDURE — 82565 ASSAY OF CREATININE: CPT

## 2022-06-28 RX ORDER — PIOGLITAZONEHYDROCHLORIDE 30 MG/1
TABLET ORAL
Qty: 90 TABLET | Refills: 0 | Status: SHIPPED | OUTPATIENT
Start: 2022-06-28

## 2022-07-11 ENCOUNTER — OFFICE VISIT (OUTPATIENT)
Dept: FAMILY MEDICINE CLINIC | Facility: CLINIC | Age: 47
End: 2022-07-11

## 2022-07-11 VITALS
WEIGHT: 171 LBS | DIASTOLIC BLOOD PRESSURE: 78 MMHG | BODY MASS INDEX: 22.66 KG/M2 | HEIGHT: 73 IN | OXYGEN SATURATION: 97 % | SYSTOLIC BLOOD PRESSURE: 124 MMHG | TEMPERATURE: 96.2 F | HEART RATE: 118 BPM | RESPIRATION RATE: 16 BRPM

## 2022-07-11 DIAGNOSIS — E11.8 TYPE 2 DIABETES MELLITUS WITH COMPLICATION, WITHOUT LONG-TERM CURRENT USE OF INSULIN (HCC): Primary | ICD-10-CM

## 2022-07-11 PROBLEM — Z02.4 DRIVER'S PERMIT PE (PHYSICAL EXAMINATION): Status: RESOLVED | Noted: 2022-03-25 | Resolved: 2022-07-11

## 2022-07-11 LAB — SL AMB POCT HEMOGLOBIN AIC: 8.1 (ref ?–6.5)

## 2022-07-11 PROCEDURE — 99213 OFFICE O/P EST LOW 20 MIN: CPT | Performed by: FAMILY MEDICINE

## 2022-07-11 PROCEDURE — 83036 HEMOGLOBIN GLYCOSYLATED A1C: CPT | Performed by: FAMILY MEDICINE

## 2022-07-11 NOTE — PROGRESS NOTES
[facility]   [address]   [facility], [address]  Phone#  [phone]  Fax#  [phone]    ASSESSMENT and PLAN  Type 2 diabetes mellitus with complication, without long-term current use of insulin (HCC)  · Uncontrolled, but improving   · A1C today 8 1 (previously 4/4/22: 11 Last A1c-9 6 (9/2021),Goal A1C<7  · Last Micro/Cr: wnl  · Home medications: Metformin 1000 mg BID, Januvia 100 mg qd, Pioglitazone 30 mg   · Continue Metformin 1,000 mg BID, Januvia 100 mg qd, Pioglitazone 30 mg   · Congratulated patient on excellent improvement of blood sugars  Stated he cut out significantly juices, and foods high in carbohydrates  · Need to discuss starting Statin medication   · Follows regularly with Podiatry  · Will continue home regiment at this time and follow up in 3 months     Diagnoses and all orders for this visit:    Type 2 diabetes mellitus with complication, without long-term current use of insulin (HCC)  -     POCT hemoglobin A1c      HISTORY OF PRESENT ILLNESS  Deanna Garrido  is a 42 y o  male with a significant PMHx of GERD, Rheumatoid arthritis, Venous stasis, who presents to the clinic for management of chronic conditions specifically management of their chronic medical conditions  Patient's medical conditions are stable unless noted otherwise above  Patient has not had any recent hospitalizations, or medical emergencies since last visit  Patient has no further complaints other than what is mentioned in the ROS  REVIEW OF SYSTEMS  Review of Systems   Constitutional: Negative for chills, fatigue, fever and unexpected weight change  HENT: Negative for congestion, rhinorrhea, sinus pressure and sore throat  Eyes: Negative for visual disturbance  Respiratory: Negative for cough, chest tightness, shortness of breath and wheezing  Cardiovascular: Negative for chest pain     Gastrointestinal: Negative for abdominal distention, abdominal pain, blood in stool, constipation, diarrhea, nausea and vomiting  Genitourinary: Negative for difficulty urinating, dysuria, frequency, hematuria and urgency  Musculoskeletal: Negative for back pain and neck pain  Skin: Negative for rash  Allergic/Immunologic: Negative  Neurological: Negative for dizziness, weakness, light-headedness, numbness and headaches  Psychiatric/Behavioral: Negative for behavioral problems  PAST MEDICAL HISTORY   Past Medical History:   Diagnosis Date    Abscess of lower leg 2020    Management per cellulitis above  No past surgical history on file  Social History     Socioeconomic History    Marital status: Single     Spouse name: Not on file    Number of children: Not on file    Years of education: Not on file    Highest education level: Not on file   Occupational History    Not on file   Tobacco Use    Smoking status: Former Smoker     Quit date: 2013     Years since quittin 8    Smokeless tobacco: Never Used   Substance and Sexual Activity    Alcohol use: Yes    Drug use: Yes     Types: Marijuana    Sexual activity: Not on file   Other Topics Concern    Not on file   Social History Narrative    Not on file     Social Determinants of Health     Financial Resource Strain: Unknown    Difficulty of Paying Living Expenses: Patient refused   Food Insecurity: Unknown    Worried About Running Out of Food in the Last Year: Patient refused    Ran Out of Food in the Last Year: Patient refused   Transportation Needs: Unknown    Lack of Transportation (Medical): Patient refused    Lack of Transportation (Non-Medical): Patient refused   Physical Activity: Not on file   Stress: Not on file   Social Connections: Not on file   Intimate Partner Violence: Not on file   Housing Stability: Not on file     No family history on file      MEDICATIONS    Current Outpatient Medications:     Accu-Chek FastClix Lancets MISC, Use 2 (two) times a day, Disp: 100 each, Rfl: 5   acetaminophen (TYLENOL) 500 mg tablet, Take 2 tablets (1,000 mg total) by mouth every 8 (eight) hours as needed for mild pain, Disp: 90 tablet, Rfl: 3    baclofen 20 mg tablet, Take 1 tablet (20 mg total) by mouth 3 (three) times a day, Disp: 90 tablet, Rfl: 0    Blood Glucose Monitoring Suppl (Accu-Chek Guide) w/Device KIT, Use 2 (two) times a day, Disp: 1 kit, Rfl: 0    Diclofenac Sodium (VOLTAREN) 1 %, Apply 2 g topically 4 (four) times a day, Disp: 50 g, Rfl: 2    glucose blood (Accu-Chek Guide) test strip, Use as instructed, Disp: 100 strip, Rfl: 5    Januvia 100 MG tablet, take 1 tablet by mouth once daily, Disp: 90 tablet, Rfl: 1    leflunomide (ARAVA) 20 MG tablet, Take 20 mg by mouth daily, Disp: , Rfl: 0    lidocaine (Lidoderm) 5 %, Apply 1 patch topically daily Remove & Discard patch within 12 hours or as directed by MD, Disp: 30 patch, Rfl: 1    loratadine (CLARITIN) 10 mg tablet, Take 1 tablet (10 mg total) by mouth daily, Disp: 100 tablet, Rfl: 3    meloxicam (MOBIC) 15 mg tablet, take 1 tablet by mouth once daily, Disp: 30 tablet, Rfl: 2    metFORMIN (GLUCOPHAGE) 1000 MG tablet, take 1 tablet by mouth twice a day with meals, Disp: 180 tablet, Rfl: 1    pioglitazone (ACTOS) 30 mg tablet, take 1 tablet by mouth once daily, Disp: 90 tablet, Rfl: 0    propranolol (INDERAL) 10 mg tablet, , Disp: , Rfl:     QUEtiapine (SEROQUEL) 200 mg tablet, daily as needed , Disp: , Rfl:     QUEtiapine (SEROquel) 300 mg tablet, Take by mouth daily as needed, Disp: , Rfl:     traMADol (ULTRAM) 50 mg tablet, Take 1 tablet (50 mg total) by mouth every 6 (six) hours as needed for moderate pain, Disp: 15 tablet, Rfl: 0    PHYSICAL EXAM  Vitals:    07/11/22 1023   BP: 124/78   BP Location: Left arm   Patient Position: Sitting   Cuff Size: Standard   Pulse: (!) 118   Resp: 16   Temp: (!) 96 2 °F (35 7 °C)   TempSrc: Temporal   SpO2: 97%   Weight: 77 6 kg (171 lb)   Height: 6' 1" (1 854 m)     Wt Readings from Last 3 Encounters:   07/11/22 77 6 kg (171 lb)   06/07/22 76 7 kg (169 lb)   05/04/22 78 kg (172 lb)    , Body mass index is 22 56 kg/m²  Physical Exam  Vitals and nursing note reviewed  Constitutional:       General: He is not in acute distress  Appearance: He is well-developed  He is not toxic-appearing  HENT:      Head: Normocephalic and atraumatic  Eyes:      Extraocular Movements: Extraocular movements intact  Pupils: Pupils are equal, round, and reactive to light  Cardiovascular:      Rate and Rhythm: Normal rate and regular rhythm  Heart sounds: Normal heart sounds  No murmur heard  Pulmonary:      Effort: Pulmonary effort is normal  No respiratory distress  Breath sounds: Normal breath sounds  No wheezing, rhonchi or rales  Abdominal:      General: Bowel sounds are normal  There is no distension  Palpations: Abdomen is soft  Tenderness: There is no abdominal tenderness  There is no guarding  Musculoskeletal:         General: Normal range of motion  Cervical back: Normal range of motion and neck supple  Skin:     General: Skin is warm and dry  Neurological:      Mental Status: He is alert and oriented to person, place, and time  Psychiatric:         Behavior: Behavior normal        LABS/IMAGING  Hemoglobin A1C   Date Value Ref Range Status   07/11/2022 8 1 (A) 6 5 Final   06/09/2021 9 2 (H) Normal 3 8-5 6%; PreDiabetic 5 7-6 4%; Diabetic >=6 5%; Glycemic control for adults with diabetes <7 0% % Final     HDL, Direct   Date Value Ref Range Status   06/09/2021 30 (L) >=40 mg/dL Final     Comment:     HDL Cholesterol:       Low     <41 mg/dL  Specimen collection should occur prior to Metamizole administration due to the potential for falsley depressed results       Triglycerides   Date Value Ref Range Status   06/09/2021 438 (H) <150 mg/dL Final     Comment:     Triglyceride:     Normal          <150 mg/dl     Borderline High 150-199 mg/dl     High 200-499 mg/dl        Very High       >499 mg/dl    Specimen collection should occur prior to N-Acetylcysteine or Metamizole administration due to the potential for falsely depressed results        WBC   Date Value Ref Range Status   06/28/2022 6 19 4 31 - 10 16 Thousand/uL Final   01/05/2022 6 20 4 50 - 11 00 Thousand/uL Final   09/14/2021 5 60 4 50 - 11 00 Thousand/uL Final   06/07/2018 6 8 4 5 - 11 0 K/MCL Final     Hemoglobin   Date Value Ref Range Status   06/28/2022 12 7 12 0 - 17 0 g/dL Final   01/05/2022 13 5 13 5 - 17 5 g/dL Final   09/14/2021 13 7 13 5 - 17 5 g/dL Final   06/07/2018 13 5 13 5 - 17 5 G/DL Final     Platelets   Date Value Ref Range Status   06/28/2022 322 149 - 390 Thousands/uL Final   01/05/2022 349 150 - 450 Thousands/uL Final   09/14/2021 238 150 - 450 Thousands/uL Final   06/07/2018 264 150 - 450 K/MCL Final     Potassium   Date Value Ref Range Status   01/05/2021 4 0 3 6 - 5 0 mmol/L Final   07/28/2020 3 8 3 6 - 5 0 mmol/L Final   02/22/2019 3 7 3 6 - 5 0 mmol/L Final     Chloride   Date Value Ref Range Status   01/05/2021 105 97 - 108 mmol/L Final   07/28/2020 104 97 - 108 mmol/L Final   02/22/2019 100 97 - 108 mmol/L Final     CO2   Date Value Ref Range Status   01/05/2021 28 22 - 30 mmol/L Final   07/28/2020 27 22 - 30 mmol/L Final   02/22/2019 28 22 - 30 mmol/L Final     BUN   Date Value Ref Range Status   01/05/2021 9 5 - 25 mg/dL Final   07/28/2020 8 5 - 25 mg/dL Final   02/22/2019 8 5 - 25 mg/dL Final     Creatinine   Date Value Ref Range Status   06/28/2022 0 61 (L) 0 70 - 1 50 mg/dL Final     Comment:     Standardized to IDMS reference method   01/05/2022 0 67 (L) 0 70 - 1 50 mg/dL Final     Comment:     Standardized to IDMS reference method   09/14/2021 0 59 (L) 0 70 - 1 50 mg/dL Final     Comment:     Standardized to IDMS reference method     eGFR   Date Value Ref Range Status   06/28/2022 121 ml/min/1 73sq m Final   01/05/2022 116 ml/min/1 73sq m Final   09/14/2021 124 >60 ml/min/1 73sq m Final     Calcium   Date Value Ref Range Status   01/05/2021 9 3 8 4 - 10 2 mg/dL Final   07/28/2020 9 3 8 4 - 10 2 mg/dL Final   02/22/2019 10 0 8 4 - 10 2 mg/dL Final     AST   Date Value Ref Range Status   06/28/2022 16 (L) 17 - 59 U/L Final     Comment:     Specimen collection should occur prior to Sulfasalazine administration due to the potential for falsely depressed results  01/05/2022 13 (L) 17 - 59 U/L Final     Comment:     Specimen collection should occur prior to Sulfasalazine administration due to the potential for falsely depressed results  09/14/2021 24 17 - 59 U/L Final     Comment:     Specimen collection should occur prior to Sulfasalazine administration due to the potential for falsely depressed results  06/07/2018 12 (L) 17 - 59 U/L Final     ALT   Date Value Ref Range Status   06/28/2022 19 <50 U/L Final   01/05/2022 15 <50 U/L Final   09/14/2021 20 <50 U/L Final   06/07/2018 25 9 - 52 U/L Final     Alkaline Phosphatase   Date Value Ref Range Status   01/05/2021 77 43 - 122 U/L Final   07/28/2020 87 43 - 122 U/L Final   02/22/2019 98 43 - 122 U/L Final     Magnesium   Date Value Ref Range Status   02/22/2019 1 9 1 6 - 2 3 mg/dL Final     No results found for: TSH    This note has been dictated using [employer] software  It may contain errors, including improperly dictated words  Please contact physician directly for any questions       Varsha Guzmán MD   PGY-2

## 2022-07-11 NOTE — ASSESSMENT & PLAN NOTE
· Uncontrolled, but improving   · A1C today 8 1 (previously 4/4/22: 11 Last A1c-9 6 (9/2021),Goal A1C<7  · Last Micro/Cr: wnl  · Home medications: Metformin 1000 mg BID, Januvia 100 mg qd, Pioglitazone 30 mg   · Continue Metformin 1,000 mg BID, Januvia 100 mg qd, Pioglitazone 30 mg   · Congratulated patient on excellent improvement of blood sugars   Stated he cut out significantly juices, and foods high in carbohydrates  · Need to discuss starting Statin medication   · Follows regularly with Podiatry  · Will continue home regiment at this time and follow up in 3 months

## 2022-09-22 DIAGNOSIS — E11.9 TYPE 2 DIABETES MELLITUS WITHOUT COMPLICATION, WITHOUT LONG-TERM CURRENT USE OF INSULIN (HCC): ICD-10-CM

## 2022-09-23 RX ORDER — PIOGLITAZONEHYDROCHLORIDE 30 MG/1
TABLET ORAL
Qty: 90 TABLET | Refills: 0 | Status: SHIPPED | OUTPATIENT
Start: 2022-09-23

## 2022-10-03 ENCOUNTER — OFFICE VISIT (OUTPATIENT)
Dept: FAMILY MEDICINE CLINIC | Facility: CLINIC | Age: 47
End: 2022-10-03

## 2022-10-03 VITALS
OXYGEN SATURATION: 97 % | TEMPERATURE: 98 F | RESPIRATION RATE: 18 BRPM | WEIGHT: 175.4 LBS | HEART RATE: 102 BPM | BODY MASS INDEX: 23.25 KG/M2 | HEIGHT: 73 IN

## 2022-10-03 DIAGNOSIS — Z00.00 HEALTHCARE MAINTENANCE: ICD-10-CM

## 2022-10-03 DIAGNOSIS — M06.9 RHEUMATOID ARTHRITIS (HCC): ICD-10-CM

## 2022-10-03 DIAGNOSIS — E11.8 TYPE 2 DIABETES MELLITUS WITH COMPLICATION, WITHOUT LONG-TERM CURRENT USE OF INSULIN (HCC): Primary | ICD-10-CM

## 2022-10-03 DIAGNOSIS — Z23 ENCOUNTER FOR IMMUNIZATION: ICD-10-CM

## 2022-10-03 DIAGNOSIS — Z11.59 ENCOUNTER FOR HEPATITIS C SCREENING TEST FOR LOW RISK PATIENT: ICD-10-CM

## 2022-10-03 DIAGNOSIS — E78.1 HYPERTRIGLYCERIDEMIA: ICD-10-CM

## 2022-10-03 PROBLEM — M62.830 BACK MUSCLE SPASM: Status: RESOLVED | Noted: 2021-10-21 | Resolved: 2022-10-03

## 2022-10-03 PROBLEM — I87.2 VENOUS STASIS DERMATITIS OF LEFT LOWER EXTREMITY: Status: RESOLVED | Noted: 2019-11-06 | Resolved: 2022-10-03

## 2022-10-03 PROBLEM — K21.9 GERD (GASTROESOPHAGEAL REFLUX DISEASE): Status: RESOLVED | Noted: 2019-02-05 | Resolved: 2022-10-03

## 2022-10-03 PROBLEM — M79.671 FOOT PAIN, BILATERAL: Status: RESOLVED | Noted: 2018-11-21 | Resolved: 2022-10-03

## 2022-10-03 PROBLEM — S51.012A ELBOW LACERATION, LEFT, INITIAL ENCOUNTER: Status: RESOLVED | Noted: 2021-07-07 | Resolved: 2022-10-03

## 2022-10-03 PROBLEM — K04.7 DENTAL ABSCESS: Status: RESOLVED | Noted: 2022-03-14 | Resolved: 2022-10-03

## 2022-10-03 PROBLEM — M79.672 FOOT PAIN, BILATERAL: Status: RESOLVED | Noted: 2018-11-21 | Resolved: 2022-10-03

## 2022-10-03 PROCEDURE — 90682 RIV4 VACC RECOMBINANT DNA IM: CPT | Performed by: FAMILY MEDICINE

## 2022-10-03 PROCEDURE — 90677 PCV20 VACCINE IM: CPT | Performed by: FAMILY MEDICINE

## 2022-10-03 PROCEDURE — 99213 OFFICE O/P EST LOW 20 MIN: CPT | Performed by: FAMILY MEDICINE

## 2022-10-03 PROCEDURE — G0009 ADMIN PNEUMOCOCCAL VACCINE: HCPCS | Performed by: FAMILY MEDICINE

## 2022-10-03 PROCEDURE — G0008 ADMIN INFLUENZA VIRUS VAC: HCPCS | Performed by: FAMILY MEDICINE

## 2022-10-03 RX ORDER — MELOXICAM 15 MG/1
15 TABLET ORAL DAILY
Qty: 30 TABLET | Refills: 2
Start: 2022-10-03

## 2022-10-03 RX ORDER — TOFACITINIB 11 MG/1
1 TABLET, FILM COATED, EXTENDED RELEASE ORAL DAILY
COMMUNITY
Start: 2022-09-07

## 2022-10-03 NOTE — ASSESSMENT & PLAN NOTE
· Uncontrolled, but improving   · A1C previously 8 1 7/2022, 4/4/22: 11 Last A1c-9 6 (9/2021),Goal A1C<7  · Last Micro/Cr: wnl  · Home medications: Metformin 1000 mg BID, Januvia 100 mg qd, Pioglitazone 30 mg   · PCV 20 given today; UTD on all pneumococcal shots  · Continue Metformin 1,000 mg BID, Januvia 100 mg qd, Pioglitazone 30 mg   · Need to discuss starting Statin medication- will think about it and discuss at next visit   In the meantime will order Lipid Panel   · Follows regularly with Podiatry  · Will get A1C/Microalbumin Creatinine in 2 weeks and adjust medications as necessary

## 2022-10-03 NOTE — PROGRESS NOTES
[facility]   [address] [facility] , [facility] [address], [address]  Phone#  [phone]  Fax#  [phone]    ASSESSMENT and PLAN  Type 2 diabetes mellitus with complication, without long-term current use of insulin ([facility])  · Uncontrolled, but improving   · A1C previously 8 1 7/2022, 4/4/22: 11 Last A1c-9 6 (9/2021),Goal A1C<7  · Last Micro/Cr: wnl  · Home medications: Metformin 1000 mg BID, Januvia 100 mg qd, Pioglitazone 30 mg   · PCV 20 given today; UTD on all pneumococcal shots  · Continue Metformin 1,000 mg BID, Januvia 100 mg qd, Pioglitazone 30 mg   · Need to discuss starting Statin medication- will think about it and discuss at next visit  In the meantime will order Lipid Panel   · Follows regularly with Podiatry  · Will get A1C/Microalbumin Creatinine in 2 weeks and adjust medications as necessary    Hypertriglyceridemia  · TAG's 6/2021-438  · No treatment less than 500  · Likely secondary to not fasting, diabetes mellitus II   · Weight stale, BMI 23 73  ASCVD 10 year risk score: 5 2 5 - 7 5% low risk  Patient counseled on diet and exercise  Advised to please obtain Lipid Panel    Rheumatoid arthritis ([facility] [address] )  · Follows with Dr Cheri Montalvo of Rheumatology at [facility]  · Home medications: Hunt Mew 1 tab daily, Meloixcam, Leflunomide 20 mg  · Continue home medications    Healthcare maintenance  · Flu vaccine given today  · PCV 20 vaccine administered today  · Hep C ordered    Diagnoses and all orders for this visit:    Type 2 diabetes mellitus with complication, without long-term current use of insulin ([facility] [address] )  -     Microalbumin / creatinine urine ratio  -     HEMOGLOBIN A1C W/ EAG ESTIMATION; Future  -     Lipid Panel with Direct LDL reflex;  Future    Encounter for immunization  -     Pneumococcal Conjugate Vaccine 20-valent (Pcv20)  -     influenza vaccine, quadrivalent, recombinant, PF, 0 5 mL, for patients 18 yr+ (FLUBLOK)    Encounter for hepatitis C screening test for low risk patient  - Hepatitis C antibody; Future    Hypertriglyceridemia    Rheumatoid arthritis (HCC)  -     meloxicam (MOBIC) 15 mg tablet; Take 1 tablet (15 mg total) by mouth daily    Healthcare maintenance    Other orders  -     Xeljanz XR 11 MG TB24; Take 1 tablet by mouth daily        HISTORY OF PRESENT ILLNESS  Jamie Jaramillo  is a 42 y o  male with a significant PMHx of DMII, Rheumatoid Arthritis who presents to the clinic for  management of their chronic medical conditions  Patient's medical conditions are stable unless noted otherwise above  Patient has not had any recent hospitalizations, or medical emergencies since last visit  Patient has no further complaints other than what is mentioned in the ROS  REVIEW OF SYSTEMS  Review of Systems   Constitutional: Negative for chills, fatigue, fever and unexpected weight change  HENT: Negative for congestion, rhinorrhea, sinus pressure and sore throat  Eyes: Negative for visual disturbance  Respiratory: Negative for cough, chest tightness, shortness of breath and wheezing  Cardiovascular: Negative for chest pain  Gastrointestinal: Negative for abdominal distention, abdominal pain, blood in stool, constipation, diarrhea, nausea and vomiting  Genitourinary: Negative for difficulty urinating, dysuria, frequency, hematuria and urgency  Musculoskeletal: Negative for back pain and neck pain  Skin: Negative for rash  Allergic/Immunologic: Negative  Neurological: Negative for dizziness, weakness, light-headedness, numbness and headaches  Psychiatric/Behavioral: Negative for behavioral problems  PAST MEDICAL HISTORY   Past Medical History:   Diagnosis Date    Abscess of lower leg 8/23/2020    Management per cellulitis above      Back muscle spasm 10/21/2021    Dental abscess 3/14/2022    Elbow laceration, left, initial encounter 7/7/2021    Foot pain, bilateral 11/21/2018    GERD (gastroesophageal reflux disease) 2/5/2019    Presentation suspicious for GERD Will admister 8 week trial of PPi and workup as needed   Venous stasis dermatitis of left lower extremity 2019     No past surgical history on file  Social History     Socioeconomic History    Marital status: Single     Spouse name: Not on file    Number of children: Not on file    Years of education: Not on file    Highest education level: Not on file   Occupational History    Not on file   Tobacco Use    Smoking status: Former Smoker     Quit date: 2013     Years since quittin 1    Smokeless tobacco: Never Used   Substance and Sexual Activity    Alcohol use: Yes    Drug use: Yes     Types: Marijuana    Sexual activity: Not on file   Other Topics Concern    Not on file   Social History Narrative    Not on file     Social Determinants of Health     Financial Resource Strain: Low Risk     Difficulty of Paying Living Expenses: Not hard at all   Food Insecurity: No Food Insecurity    Worried About Running Out of Food in the Last Year: Never true    Katie of Food in the Last Year: Never true   Transportation Needs: No Transportation Needs    Lack of Transportation (Medical): No    Lack of Transportation (Non-Medical): No   Physical Activity: Not on file   Stress: Not on file   Social Connections: Not on file   Intimate Partner Violence: Not on file   Housing Stability: Not on file     No family history on file      MEDICATIONS    Current Outpatient Medications:     meloxicam (MOBIC) 15 mg tablet, Take 1 tablet (15 mg total) by mouth daily, Disp: 30 tablet, Rfl: 2    Accu-Chek FastClix Lancets MISC, Use 2 (two) times a day, Disp: 100 each, Rfl: 5    Blood Glucose Monitoring Suppl (Accu-Chek Guide) w/Device KIT, Use 2 (two) times a day, Disp: 1 kit, Rfl: 0    glucose blood (Accu-Chek Guide) test strip, Use as instructed, Disp: 100 strip, Rfl: 5    Januvia 100 MG tablet, take 1 tablet by mouth once daily, Disp: 90 tablet, Rfl: 1    leflunomide (ARAVA) 20 MG tablet, Take 20 mg by mouth daily, Disp: , Rfl: 0    loratadine (CLARITIN) 10 mg tablet, Take 1 tablet (10 mg total) by mouth daily, Disp: 100 tablet, Rfl: 3    metFORMIN (GLUCOPHAGE) 1000 MG tablet, take 1 tablet by mouth twice a day with meals, Disp: 180 tablet, Rfl: 1    pioglitazone (ACTOS) 30 mg tablet, take 1 tablet by mouth once daily, Disp: 90 tablet, Rfl: 0    propranolol (INDERAL) 10 mg tablet, , Disp: , Rfl:     QUEtiapine (SEROQUEL) 200 mg tablet, daily as needed , Disp: , Rfl:     QUEtiapine (SEROquel) 300 mg tablet, Take by mouth daily as needed, Disp: , Rfl:     Xeljanz XR 11 MG TB24, Take 1 tablet by mouth daily, Disp: , Rfl:     PHYSICAL EXAM  Vitals:    10/03/22 1453   Pulse: 102   Resp: 18   Temp: 98 °F (36 7 °C)   TempSrc: Temporal   SpO2: 97%   Weight: 79 6 kg (175 lb 6 4 oz)   Height: 6' 1" (1 854 m)     Wt Readings from Last 3 Encounters:   10/03/22 79 6 kg (175 lb 6 4 oz)   07/11/22 77 6 kg (171 lb)   06/07/22 76 7 kg (169 lb)    , Body mass index is 23 14 kg/m²  Physical Exam  Vitals and nursing note reviewed  Constitutional:       General: He is not in acute distress  Appearance: He is well-developed  He is not toxic-appearing  HENT:      Head: Normocephalic and atraumatic  Eyes:      Extraocular Movements: Extraocular movements intact  Pupils: Pupils are equal, round, and reactive to light  Cardiovascular:      Rate and Rhythm: Normal rate and regular rhythm  Heart sounds: Normal heart sounds  No murmur heard  Pulmonary:      Effort: Pulmonary effort is normal  No respiratory distress  Breath sounds: Normal breath sounds  No wheezing, rhonchi or rales  Abdominal:      General: Bowel sounds are normal  There is no distension  Palpations: Abdomen is soft  Tenderness: There is no abdominal tenderness  There is no guarding  Musculoskeletal:         General: Normal range of motion  Cervical back: Normal range of motion and neck supple  Skin:     General: Skin is warm and dry  Neurological:      Mental Status: He is alert and oriented to person, place, and time  Psychiatric:         Behavior: Behavior normal          LABS/IMAGING  Hemoglobin A1C   Date Value Ref Range Status   07/11/2022 8 1 (A) 6 5 Final   06/09/2021 9 2 (H) Normal 3 8-5 6%; PreDiabetic 5 7-6 4%; Diabetic >=6 5%; Glycemic control for adults with diabetes <7 0% % Final     HDL, Direct   Date Value Ref Range Status   06/09/2021 30 (L) >=40 mg/dL Final     Comment:     HDL Cholesterol:       Low     <41 mg/dL  Specimen collection should occur prior to Metamizole administration due to the potential for falsley depressed results  Triglycerides   Date Value Ref Range Status   06/09/2021 438 (H) <150 mg/dL Final     Comment:     Triglyceride:     Normal          <150 mg/dl     Borderline High 150-199 mg/dl     High            200-499 mg/dl        Very High       >499 mg/dl    Specimen collection should occur prior to N-Acetylcysteine or Metamizole administration due to the potential for falsely depressed results        WBC   Date Value Ref Range Status   06/28/2022 6 19 4 31 - 10 16 Thousand/uL Final   01/05/2022 6 20 4 50 - 11 00 Thousand/uL Final   09/14/2021 5 60 4 50 - 11 00 Thousand/uL Final   06/07/2018 6 8 4 5 - 11 0 K/MCL Final     Hemoglobin   Date Value Ref Range Status   06/28/2022 12 7 12 0 - 17 0 g/dL Final   01/05/2022 13 5 13 5 - 17 5 g/dL Final   09/14/2021 13 7 13 5 - 17 5 g/dL Final   06/07/2018 13 5 13 5 - 17 5 G/DL Final     Platelets   Date Value Ref Range Status   06/28/2022 322 149 - 390 Thousands/uL Final   01/05/2022 349 150 - 450 Thousands/uL Final   09/14/2021 238 150 - 450 Thousands/uL Final   06/07/2018 264 150 - 450 K/MCL Final     Potassium   Date Value Ref Range Status   01/05/2021 4 0 3 6 - 5 0 mmol/L Final   07/28/2020 3 8 3 6 - 5 0 mmol/L Final   02/22/2019 3 7 3 6 - 5 0 mmol/L Final     Chloride   Date Value Ref Range Status   01/05/2021 105 97 - 108 mmol/L Final   07/28/2020 104 97 - 108 mmol/L Final   02/22/2019 100 97 - 108 mmol/L Final     CO2   Date Value Ref Range Status   01/05/2021 28 22 - 30 mmol/L Final   07/28/2020 27 22 - 30 mmol/L Final   02/22/2019 28 22 - 30 mmol/L Final     BUN   Date Value Ref Range Status   01/05/2021 9 5 - 25 mg/dL Final   07/28/2020 8 5 - 25 mg/dL Final   02/22/2019 8 5 - 25 mg/dL Final     Creatinine   Date Value Ref Range Status   06/28/2022 0 61 (L) 0 70 - 1 50 mg/dL Final     Comment:     Standardized to IDMS reference method   01/05/2022 0 67 (L) 0 70 - 1 50 mg/dL Final     Comment:     Standardized to IDMS reference method   09/14/2021 0 59 (L) 0 70 - 1 50 mg/dL Final     Comment:     Standardized to IDMS reference method     eGFR   Date Value Ref Range Status   06/28/2022 121 ml/min/1 73sq m Final   01/05/2022 116 ml/min/1 73sq m Final   09/14/2021 124 >60 ml/min/1 73sq m Final     Calcium   Date Value Ref Range Status   01/05/2021 9 3 8 4 - 10 2 mg/dL Final   07/28/2020 9 3 8 4 - 10 2 mg/dL Final   02/22/2019 10 0 8 4 - 10 2 mg/dL Final     AST   Date Value Ref Range Status   06/28/2022 16 (L) 17 - 59 U/L Final     Comment:     Specimen collection should occur prior to Sulfasalazine administration due to the potential for falsely depressed results  01/05/2022 13 (L) 17 - 59 U/L Final     Comment:     Specimen collection should occur prior to Sulfasalazine administration due to the potential for falsely depressed results  09/14/2021 24 17 - 59 U/L Final     Comment:     Specimen collection should occur prior to Sulfasalazine administration due to the potential for falsely depressed results      06/07/2018 12 (L) 17 - 59 U/L Final     ALT   Date Value Ref Range Status   06/28/2022 19 <50 U/L Final   01/05/2022 15 <50 U/L Final   09/14/2021 20 <50 U/L Final   06/07/2018 25 9 - 52 U/L Final     Alkaline Phosphatase   Date Value Ref Range Status   01/05/2021 77 43 - 122 U/L Final   07/28/2020 87 43 - 122 U/L Final   02/22/2019 98 43 - 122 U/L Final     Magnesium   Date Value Ref Range Status   02/22/2019 1 9 1 6 - 2 3 mg/dL Final     No results found for: TSH    This note has been dictated using [employer] software  It may contain errors, including improperly dictated words  Please contact physician directly for any questions       Dimitri Schirmer   PGY-3

## 2022-10-04 NOTE — ASSESSMENT & PLAN NOTE
· Follows with Dr Kwabena Rubalcava of Rheumatology at [address]  · Home medications: Nabila Pastures 1 tab daily, Meloixcam, Leflunomide 20 mg  · Continue home medications

## 2022-10-04 NOTE — ASSESSMENT & PLAN NOTE
· TAG's 6/2021-438  · No treatment less than 500  · Likely secondary to not fasting, diabetes mellitus II   · Weight stale, BMI 23 73  ASCVD 10 year risk score: 5 2 5 - 7 5% low risk    Patient counseled on diet and exercise  Advised to please obtain Lipid Panel

## 2022-10-10 NOTE — PROGRESS NOTES
Hi!    Can you please call Jamie and let him know he can  his forms for plasma donation  I filled them out however he is going to need his Rheumatologist to clear him because he is on a medication for his rheumatoid arthritis that has many side effects including increased risk of blood clots  Thanks so much      Horatio Osler

## 2022-10-11 ENCOUNTER — TELEPHONE (OUTPATIENT)
Dept: FAMILY MEDICINE CLINIC | Facility: CLINIC | Age: 47
End: 2022-10-11

## 2022-10-11 NOTE — TELEPHONE ENCOUNTER
----- Message from Nate Larkin MD sent at 10/10/2022  2:31 PM EDT -----  Hi! Can you please call Jamie and let him know he can  his forms for plasma donation  I filled them out however he is going to need his Rheumatologist to clear him because he is on a medication for his rheumatoid arthritis that has a lot of side effects including increased risk of blood clots  Thanks so much      Sandra Shah

## 2022-10-20 ENCOUNTER — APPOINTMENT (OUTPATIENT)
Dept: LAB | Facility: HOSPITAL | Age: 47
End: 2022-10-20
Payer: MEDICARE

## 2022-10-20 DIAGNOSIS — Z11.59 ENCOUNTER FOR HEPATITIS C SCREENING TEST FOR LOW RISK PATIENT: ICD-10-CM

## 2022-10-20 DIAGNOSIS — E11.8 TYPE 2 DIABETES MELLITUS WITH COMPLICATION, WITHOUT LONG-TERM CURRENT USE OF INSULIN (HCC): ICD-10-CM

## 2022-10-20 LAB
CREAT UR-MCNC: 279 MG/DL
MICROALBUMIN UR-MCNC: 46.2 MG/L (ref 0–20)
MICROALBUMIN/CREAT 24H UR: 17 MG/G CREATININE (ref 0–30)

## 2022-10-20 PROCEDURE — 82570 ASSAY OF URINE CREATININE: CPT | Performed by: STUDENT IN AN ORGANIZED HEALTH CARE EDUCATION/TRAINING PROGRAM

## 2022-10-20 PROCEDURE — 82043 UR ALBUMIN QUANTITATIVE: CPT | Performed by: STUDENT IN AN ORGANIZED HEALTH CARE EDUCATION/TRAINING PROGRAM

## 2022-10-31 ENCOUNTER — APPOINTMENT (OUTPATIENT)
Dept: LAB | Facility: HOSPITAL | Age: 47
End: 2022-10-31

## 2022-10-31 LAB
CHOLEST SERPL-MCNC: 178 MG/DL
HCV AB SER QL: NORMAL
HDLC SERPL-MCNC: 47 MG/DL
LDLC SERPL CALC-MCNC: 109 MG/DL
TRIGL SERPL-MCNC: 108 MG/DL

## 2022-11-01 LAB
EST. AVERAGE GLUCOSE BLD GHB EST-MCNC: 134 MG/DL
HBA1C MFR BLD: 6.3 %

## 2022-11-16 ENCOUNTER — APPOINTMENT (OUTPATIENT)
Dept: LAB | Facility: HOSPITAL | Age: 47
End: 2022-11-16

## 2022-11-16 DIAGNOSIS — Z51.81 ENCOUNTER FOR THERAPEUTIC DRUG MONITORING: ICD-10-CM

## 2022-11-16 DIAGNOSIS — E11.8 TYPE 2 DIABETES MELLITUS WITH COMPLICATION, WITHOUT LONG-TERM CURRENT USE OF INSULIN (HCC): ICD-10-CM

## 2022-11-16 DIAGNOSIS — Z79.899 ENCOUNTER FOR LONG-TERM (CURRENT) USE OF OTHER MEDICATIONS: ICD-10-CM

## 2022-11-16 DIAGNOSIS — E11.9 TYPE 2 DIABETES MELLITUS WITHOUT COMPLICATION, WITHOUT LONG-TERM CURRENT USE OF INSULIN (HCC): ICD-10-CM

## 2022-11-16 LAB
ALBUMIN SERPL BCP-MCNC: 4.2 G/DL (ref 3.5–5)
ALT SERPL W P-5'-P-CCNC: 22 U/L
AST SERPL W P-5'-P-CCNC: 20 U/L (ref 17–59)
BASOPHILS # BLD AUTO: 0.02 THOUSANDS/ÂΜL (ref 0–0.1)
BASOPHILS NFR BLD AUTO: 0 % (ref 0–1)
CREAT SERPL-MCNC: 0.68 MG/DL (ref 0.7–1.5)
EOSINOPHIL # BLD AUTO: 0.08 THOUSAND/ÂΜL (ref 0–0.61)
EOSINOPHIL NFR BLD AUTO: 1 % (ref 0–6)
ERYTHROCYTE [DISTWIDTH] IN BLOOD BY AUTOMATED COUNT: 13.3 % (ref 11.6–15.1)
ERYTHROCYTE [SEDIMENTATION RATE] IN BLOOD: 39 MM/HOUR (ref 0–14)
GFR SERPL CREATININE-BSD FRML MDRD: 116 ML/MIN/1.73SQ M
HCT VFR BLD AUTO: 38.4 % (ref 36.5–49.3)
HGB BLD-MCNC: 12.5 G/DL (ref 12–17)
IMM GRANULOCYTES # BLD AUTO: 0.01 THOUSAND/UL (ref 0–0.2)
IMM GRANULOCYTES NFR BLD AUTO: 0 % (ref 0–2)
LYMPHOCYTES # BLD AUTO: 1.26 THOUSANDS/ÂΜL (ref 0.6–4.47)
LYMPHOCYTES NFR BLD AUTO: 21 % (ref 14–44)
MCH RBC QN AUTO: 31.7 PG (ref 26.8–34.3)
MCHC RBC AUTO-ENTMCNC: 32.6 G/DL (ref 31.4–37.4)
MCV RBC AUTO: 98 FL (ref 82–98)
MONOCYTES # BLD AUTO: 0.5 THOUSAND/ÂΜL (ref 0.17–1.22)
MONOCYTES NFR BLD AUTO: 8 % (ref 4–12)
NEUTROPHILS # BLD AUTO: 4.15 THOUSANDS/ÂΜL (ref 1.85–7.62)
NEUTS SEG NFR BLD AUTO: 70 % (ref 43–75)
NRBC BLD AUTO-RTO: 0 /100 WBCS
PLATELET # BLD AUTO: 296 THOUSANDS/UL (ref 149–390)
PMV BLD AUTO: 10 FL (ref 8.9–12.7)
RBC # BLD AUTO: 3.94 MILLION/UL (ref 3.88–5.62)
WBC # BLD AUTO: 6.02 THOUSAND/UL (ref 4.31–10.16)

## 2022-11-19 RX ORDER — SITAGLIPTIN 100 MG/1
TABLET, FILM COATED ORAL
Qty: 90 TABLET | Refills: 1 | Status: SHIPPED | OUTPATIENT
Start: 2022-11-19

## 2022-12-02 PROBLEM — Z00.00 HEALTHCARE MAINTENANCE: Status: RESOLVED | Noted: 2022-10-03 | Resolved: 2022-12-02

## 2022-12-07 ENCOUNTER — OFFICE VISIT (OUTPATIENT)
Dept: FAMILY MEDICINE CLINIC | Facility: CLINIC | Age: 47
End: 2022-12-07

## 2022-12-07 VITALS
SYSTOLIC BLOOD PRESSURE: 128 MMHG | HEART RATE: 98 BPM | WEIGHT: 174 LBS | BODY MASS INDEX: 23.06 KG/M2 | DIASTOLIC BLOOD PRESSURE: 76 MMHG | TEMPERATURE: 96.9 F | OXYGEN SATURATION: 96 % | HEIGHT: 73 IN | RESPIRATION RATE: 18 BRPM

## 2022-12-07 DIAGNOSIS — J34.89 RHINORRHEA: ICD-10-CM

## 2022-12-07 DIAGNOSIS — Z00.00 HEALTHCARE MAINTENANCE: ICD-10-CM

## 2022-12-07 DIAGNOSIS — M79.672 FOOT PAIN, BILATERAL: ICD-10-CM

## 2022-12-07 DIAGNOSIS — Z00.00 MEDICARE ANNUAL WELLNESS VISIT, SUBSEQUENT: ICD-10-CM

## 2022-12-07 DIAGNOSIS — M06.9 RHEUMATOID ARTHRITIS, INVOLVING UNSPECIFIED SITE, UNSPECIFIED WHETHER RHEUMATOID FACTOR PRESENT (HCC): ICD-10-CM

## 2022-12-07 DIAGNOSIS — M79.671 FOOT PAIN, BILATERAL: ICD-10-CM

## 2022-12-07 DIAGNOSIS — E11.8 TYPE 2 DIABETES MELLITUS WITH COMPLICATION, WITHOUT LONG-TERM CURRENT USE OF INSULIN (HCC): Primary | ICD-10-CM

## 2022-12-07 DIAGNOSIS — R09.81 CONGESTION OF NASAL SINUS: ICD-10-CM

## 2022-12-07 DIAGNOSIS — E78.1 HYPERTRIGLYCERIDEMIA: ICD-10-CM

## 2022-12-07 RX ORDER — FLUTICASONE PROPIONATE 50 MCG
1 SPRAY, SUSPENSION (ML) NASAL DAILY
Qty: 11.1 ML | Refills: 0 | Status: SHIPPED | OUTPATIENT
Start: 2022-12-07

## 2022-12-07 NOTE — ASSESSMENT & PLAN NOTE
· A1C 10/31/22: 6 3-controlled (previous 8 1 7/2022, 4/4/22: 11 Last A1c-9 6 Goal A1C<7  · Last Micro/Cr: wnl  · Home medications: Metformin 1000 mg BID, Januvia 100 mg qd, stopped Pioglitazone 30 mg after last A1C  · PCV 20 UTD   · Continue Metformin 1,000 mg BID, Januvia 100 mg qd  · Need to discuss starting Statin medication- will think about it and discuss at next visit   In the meantime will order Lipid Panel

## 2022-12-07 NOTE — PATIENT INSTRUCTIONS
Medicare Preventive Visit Patient Instructions  Thank you for completing your Welcome to Medicare Visit or Medicare Annual Wellness Visit today  Your next wellness visit will be due in one year (12/8/2023)  The screening/preventive services that you may require over the next 5-10 years are detailed below  Some tests may not apply to you based off risk factors and/or age  Screening tests ordered at today's visit but not completed yet may show as past due  Also, please note that scanned in results may not display below  Preventive Screenings:  Service Recommendations Previous Testing/Comments   Colorectal Cancer Screening  · Colonoscopy    · Fecal Occult Blood Test (FOBT)/Fecal Immunochemical Test (FIT)  · Fecal DNA/Cologuard Test  · Flexible Sigmoidoscopy Age: 39-70 years old   Colonoscopy: every 10 years (May be performed more frequently if at higher risk)  OR  FOBT/FIT: every 1 year  OR  Cologuard: every 3 years  OR  Sigmoidoscopy: every 5 years  Screening may be recommended earlier than age 39 if at higher risk for colorectal cancer  Also, an individualized decision between you and your healthcare provider will decide whether screening between the ages of 74-80 would be appropriate   Colonoscopy: Not on file  FOBT/FIT: Not on file  Cologuard: Not on file  Sigmoidoscopy: Not on file    Screening Current     Prostate Cancer Screening Individualized decision between patient and health care provider in men between ages of 53-78   Medicare will cover every 12 months beginning on the day after your 50th birthday PSA: No results in last 5 years     Screening Not Indicated     Hepatitis C Screening Once for adults born between 1945 and 1965  More frequently in patients at high risk for Hepatitis C Hep C Antibody: 10/31/2022    Screening Current   Diabetes Screening 1-2 times per year if you're at risk for diabetes or have pre-diabetes Fasting glucose: 205 mg/dL (7/28/2020)  A1C: 6 3 % (10/31/2022)  Screening Not Indicated  History Diabetes   Cholesterol Screening Once every 5 years if you don't have a lipid disorder  May order more often based on risk factors  Lipid panel: 10/31/2022  Screening Not Indicated  History Lipid Disorder      Other Preventive Screenings Covered by Medicare:  1  Abdominal Aortic Aneurysm (AAA) Screening: covered once if your at risk  You're considered to be at risk if you have a family history of AAA or a male between the age of 73-68 who smoking at least 100 cigarettes in your lifetime  2  Lung Cancer Screening: covers low dose CT scan once per year if you meet all of the following conditions: (1) Age 50-69; (2) No signs or symptoms of lung cancer; (3) Current smoker or have quit smoking within the last 15 years; (4) You have a tobacco smoking history of at least 20 pack years (packs per day x number of years you smoked); (5) You get a written order from a healthcare provider  3  Glaucoma Screening: covered annually if you're considered high risk: (1) You have diabetes OR (2) Family history of glaucoma OR (3)  aged 48 and older OR (3)  American aged 72 and older  3  Osteoporosis Screening: covered every 2 years if you meet one of the following conditions: (1) Have a vertebral abnormality; (2) On glucocorticoid therapy for more than 3 months; (3) Have primary hyperparathyroidism; (4) On osteoporosis medications and need to assess response to drug therapy  5  HIV Screening: covered annually if you're between the age of 12-76  Also covered annually if you are younger than 13 and older than 72 with risk factors for HIV infection  For pregnant patients, it is covered up to 3 times per pregnancy      Immunizations:  Immunization Recommendations   Influenza Vaccine Annual influenza vaccination during flu season is recommended for all persons aged >= 6 months who do not have contraindications   Pneumococcal Vaccine   * Pneumococcal conjugate vaccine = PCV13 (Prevnar 13), PCV15 (Vaxneuvance), PCV20 (Prevnar 20)  * Pneumococcal polysaccharide vaccine = PPSV23 (Pneumovax) Adults 2364 years old: 1-3 doses may be recommended based on certain risk factors  Adults 72 years old: 1-2 doses may be recommended based off what pneumonia vaccine you previously received   Hepatitis B Vaccine 3 dose series if at intermediate or high risk (ex: diabetes, end stage renal disease, liver disease)   Tetanus (Td) Vaccine - COST NOT COVERED BY MEDICARE PART B Following completion of primary series, a booster dose should be given every 10 years to maintain immunity against tetanus  Td may also be given as tetanus wound prophylaxis  Tdap Vaccine - COST NOT COVERED BY MEDICARE PART B Recommended at least once for all adults  For pregnant patients, recommended with each pregnancy  Shingles Vaccine (Shingrix) - COST NOT COVERED BY MEDICARE PART B  2 shot series recommended in those aged 48 and above     Health Maintenance Due:      Topic Date Due   • Colorectal Cancer Screening  11/20/2022   • HIV Screening  Completed   • Hepatitis C Screening  Completed     Immunizations Due:      Topic Date Due   • Hepatitis B Vaccine (1 of 3 - 3-dose series) Never done   • COVID-19 Vaccine (1) Never done     Advance Directives   What are advance directives? Advance directives are legal documents that state your wishes and plans for medical care  These plans are made ahead of time in case you lose your ability to make decisions for yourself  Advance directives can apply to any medical decision, such as the treatments you want, and if you want to donate organs  What are the types of advance directives? There are many types of advance directives, and each state has rules about how to use them  You may choose a combination of any of the following:  · Living will: This is a written record of the treatment you want  You can also choose which treatments you do not want, which to limit, and which to stop at a certain time   This includes surgery, medicine, IV fluid, and tube feedings  · Durable power of  for healthcare [place] [facility]): This is a written record that states who you want to make healthcare choices for you when you are unable to make them for yourself  This person, called a proxy, is usually a family member or a friend  You may choose more than 1 proxy  · Do not resuscitate (DNR) order:  A DNR order is used in case your heart stops beating or you stop breathing  It is a request not to have certain forms of treatment, such as CPR  A DNR order may be included in other types of advance directives  · Medical directive: This covers the care that you want if you are in a coma, near death, or unable to make decisions for yourself  You can list the treatments you want for each condition  Treatment may include pain medicine, surgery, blood transfusions, dialysis, IV or tube feedings, and a ventilator (breathing machine)  · Values history: This document has questions about your views, beliefs, and how you feel and think about life  This information can help others choose the care that you would choose  Why are advance directives important? An advance directive helps you control your care  Although spoken wishes may be used, it is better to have your wishes written down  Spoken wishes can be misunderstood, or not followed  Treatments may be given even if you do not want them  An advance directive may make it easier for your family to make difficult choices about your care  © Copyright [employer] 2018 Information is for End User's use only and may not be sold, redistributed or otherwise used for commercial purposes  All illustrations and images included in CareNotes® are the copyrighted property of A D A M , Inc  or [facility] Preventive Visit Patient Instructions  Thank you for completing your Welcome to Medicare Visit or Medicare Annual Wellness Visit today   Your next wellness visit will be due in one year (12/8/2023)  The screening/preventive services that you may require over the next 5-10 years are detailed below  Some tests may not apply to you based off risk factors and/or age  Screening tests ordered at today's visit but not completed yet may show as past due  Also, please note that scanned in results may not display below  Preventive Screenings:  Service Recommendations Previous Testing/Comments   Colorectal Cancer Screening  · Colonoscopy    · Fecal Occult Blood Test (FOBT)/Fecal Immunochemical Test (FIT)  · Fecal DNA/Cologuard Test  · Flexible Sigmoidoscopy Age: 39-70 years old   Colonoscopy: every 10 years (May be performed more frequently if at higher risk)  OR  FOBT/FIT: every 1 year  OR  Cologuard: every 3 years  OR  Sigmoidoscopy: every 5 years  Screening may be recommended earlier than age 39 if at higher risk for colorectal cancer  Also, an individualized decision between you and your healthcare provider will decide whether screening between the ages of 74-80 would be appropriate  Colonoscopy: Not on file  FOBT/FIT: Not on file  Cologuard: Not on file  Sigmoidoscopy: Not on file    Screening Current     Prostate Cancer Screening Individualized decision between patient and health care provider in men between ages of 53-78   Medicare will cover every 12 months beginning on the day after your 50th birthday PSA: No results in last 5 years     Screening Not Indicated     Hepatitis C Screening Once for adults born between 1945 and 1965  More frequently in patients at high risk for Hepatitis C Hep C Antibody: 10/31/2022    Screening Current   Diabetes Screening 1-2 times per year if you're at risk for diabetes or have pre-diabetes Fasting glucose: 205 mg/dL (7/28/2020)  A1C: 6 3 % (10/31/2022)  Screening Not Indicated  History Diabetes   Cholesterol Screening Once every 5 years if you don't have a lipid disorder  May order more often based on risk factors   Lipid panel: 10/31/2022  Screening Not Indicated  History Lipid Disorder      Other Preventive Screenings Covered by Medicare:  6  Abdominal Aortic Aneurysm (AAA) Screening: covered once if your at risk  You're considered to be at risk if you have a family history of AAA or a male between the age of 73-68 who smoking at least 100 cigarettes in your lifetime  7  Lung Cancer Screening: covers low dose CT scan once per year if you meet all of the following conditions: (1) Age 50-69; (2) No signs or symptoms of lung cancer; (3) Current smoker or have quit smoking within the last 15 years; (4) You have a tobacco smoking history of at least 20 pack years (packs per day x number of years you smoked); (5) You get a written order from a healthcare provider  8  Glaucoma Screening: covered annually if you're considered high risk: (1) You have diabetes OR (2) Family history of glaucoma OR (3)  aged 48 and older OR (3)  American aged 72 and older  5  Osteoporosis Screening: covered every 2 years if you meet one of the following conditions: (1) Have a vertebral abnormality; (2) On glucocorticoid therapy for more than 3 months; (3) Have primary hyperparathyroidism; (4) On osteoporosis medications and need to assess response to drug therapy  10  HIV Screening: covered annually if you're between the age of 12-76  Also covered annually if you are younger than 13 and older than 72 with risk factors for HIV infection  For pregnant patients, it is covered up to 3 times per pregnancy      Immunizations:  Immunization Recommendations   Influenza Vaccine Annual influenza vaccination during flu season is recommended for all persons aged >= 6 months who do not have contraindications   Pneumococcal Vaccine   * Pneumococcal conjugate vaccine = PCV13 (Prevnar 13), PCV15 (Vaxneuvance), PCV20 (Prevnar 20)  * Pneumococcal polysaccharide vaccine = PPSV23 (Pneumovax) Adults 25-60 years old: 1-3 doses may be recommended based on certain risk factors  Adults 65+ years old: 1-2 doses may be recommended based off what pneumonia vaccine you previously received   Hepatitis B Vaccine 3 dose series if at intermediate or high risk (ex: diabetes, end stage renal disease, liver disease)   Tetanus (Td) Vaccine - COST NOT COVERED BY MEDICARE PART B Following completion of primary series, a booster dose should be given every 10 years to maintain immunity against tetanus  Td may also be given as tetanus wound prophylaxis  Tdap Vaccine - COST NOT COVERED BY MEDICARE PART B Recommended at least once for all adults  For pregnant patients, recommended with each pregnancy  Shingles Vaccine (Shingrix) - COST NOT COVERED BY MEDICARE PART B  2 shot series recommended in those aged 48 and above     Health Maintenance Due:      Topic Date Due   • Colorectal Cancer Screening  11/20/2022   • HIV Screening  Completed   • Hepatitis C Screening  Completed     Immunizations Due:      Topic Date Due   • Hepatitis B Vaccine (1 of 3 - 3-dose series) Never done   • COVID-19 Vaccine (1) Never done     Advance Directives   What are advance directives? Advance directives are legal documents that state your wishes and plans for medical care  These plans are made ahead of time in case you lose your ability to make decisions for yourself  Advance directives can apply to any medical decision, such as the treatments you want, and if you want to donate organs  What are the types of advance directives? There are many types of advance directives, and each state has rules about how to use them  You may choose a combination of any of the following:  · Living will: This is a written record of the treatment you want  You can also choose which treatments you do not want, which to limit, and which to stop at a certain time  This includes surgery, medicine, IV fluid, and tube feedings  · Durable power of  for healthcare [place] [facility]):   This is a written record that states who you want to make healthcare choices for you when you are unable to make them for yourself  This person, called a proxy, is usually a family member or a friend  You may choose more than 1 proxy  · Do not resuscitate (DNR) order:  A DNR order is used in case your heart stops beating or you stop breathing  It is a request not to have certain forms of treatment, such as CPR  A DNR order may be included in other types of advance directives  · Medical directive: This covers the care that you want if you are in a coma, near death, or unable to make decisions for yourself  You can list the treatments you want for each condition  Treatment may include pain medicine, surgery, blood transfusions, dialysis, IV or tube feedings, and a ventilator (breathing machine)  · Values history: This document has questions about your views, beliefs, and how you feel and think about life  This information can help others choose the care that you would choose  Why are advance directives important? An advance directive helps you control your care  Although spoken wishes may be used, it is better to have your wishes written down  Spoken wishes can be misunderstood, or not followed  Treatments may be given even if you do not want them  An advance directive may make it easier for your family to make difficult choices about your care  © Copyright [employer] 2018 Information is for End User's use only and may not be sold, redistributed or otherwise used for commercial purposes   All illustrations and images included in CareNotes® are the copyrighted property of [employer]  or [address] [place] [address] [facility]

## 2022-12-07 NOTE — ASSESSMENT & PLAN NOTE
· Flu vaccine UTD  · PCV 20 vaccine UTD   · Hep C normal  · Colonoscopy denied at this time would like to do it at 50

## 2022-12-07 NOTE — PROGRESS NOTES
Assessment and Plan:     Problem List Items Addressed This Visit        Endocrine    Type 2 diabetes mellitus with complication, without long-term current use of insulin ([facility] [address] ) - Primary     · A1C 10/31/22: 6 3-controlled (previous 8 1 7/2022, 4/4/22: 11 Last A1c-9 6 Goal A1C<7  · Last Micro/Cr: wnl  · Home medications: Metformin 1000 mg BID, Januvia 100 mg qd, stopped Pioglitazone 30 mg after last A1C  · PCV 20 UTD   · Continue Metformin 1,000 mg BID, Januvia 100 mg qd  · Need to discuss starting Statin medication- will think about it and discuss at next visit  In the meantime will order Lipid Panel             Musculoskeletal and Integument    Rheumatoid arthritis ([facility] [address] )     · Follows with Dr Chano Mcgrath of Rheumatology at [address]  · Home medications: Harrington Love 1 tab daily, Meloixcam, Leflunomide 20 mg  · Continue home medications         Relevant Orders    Ambulatory Referral to Podiatry       Other    Hypertriglyceridemia     · TAG's improved to normal to 108 from 438 (6/2021)  Marked improvement  Continue current diet and lifestyle changes         Healthcare maintenance     · Flu vaccine UTD  · PCV 20 vaccine UTD   · Hep C normal  · Colonoscopy denied at this time would like to do it at 50  Other Visit Diagnoses     Rhinorrhea        Relevant Medications    fluticasone (FLONASE) 50 mcg/act nasal spray    Congestion of nasal sinus        Relevant Medications    fluticasone (FLONASE) 50 mcg/act nasal spray    Foot pain, bilateral        Relevant Orders    Ambulatory Referral to Podiatry    Medicare annual wellness visit, subsequent               Preventive health issues were discussed with patient, and age appropriate screening tests were ordered as noted in patient's After Visit Summary  Personalized health advice and appropriate referrals for health education or preventive services given if needed, as noted in patient's After Visit Summary       History of Present Illness:     Patient presents for a Medicare Wellness Visit    Patient Care Team:  Astrid Britton MD as PCP - General (Family Medicine)     Review of Systems:     Review of Systems   Constitutional: Negative for chills, fatigue, fever and unexpected weight change  HENT: Positive for congestion  Negative for rhinorrhea, sinus pressure and sore throat  Eyes: Negative for visual disturbance  Respiratory: Negative for cough, chest tightness, shortness of breath and wheezing  Cardiovascular: Negative for chest pain  Gastrointestinal: Negative for abdominal distention, abdominal pain, blood in stool, constipation, diarrhea, nausea and vomiting  Genitourinary: Negative for difficulty urinating, dysuria, frequency, hematuria and urgency  Musculoskeletal: Negative for back pain and neck pain  Skin: Negative for rash  Allergic/Immunologic: Negative  Neurological: Negative for dizziness, weakness, light-headedness, numbness and headaches  Psychiatric/Behavioral: Negative for behavioral problems  Problem List:     Patient Active Problem List   Diagnosis   • Rheumatoid arthritis ([facility] [address] )   • Type 2 diabetes mellitus with complication, without long-term current use of insulin ([facility])   • Environmental allergies   • Hypertriglyceridemia   • Acute left-sided low back pain without sciatica   • Healthcare maintenance      Past Medical and Surgical History:     Past Medical History:   Diagnosis Date   • Abscess of lower leg 8/23/2020    Management per cellulitis above  • Back muscle spasm 10/21/2021   • Dental abscess 3/14/2022   • Elbow laceration, left, initial encounter 7/7/2021   • Foot pain, bilateral 11/21/2018   • GERD (gastroesophageal reflux disease) 2/5/2019    Presentation suspicious for GERD Will admister 8 week trial of PPi and workup as needed  • Venous stasis dermatitis of left lower extremity 11/6/2019     No past surgical history on file  Family History:     No family history on file     Social History:     Social History Socioeconomic History   • Marital status: Single     Spouse name: Not on file   • Number of children: Not on file   • Years of education: Not on file   • Highest education level: Not on file   Occupational History   • Not on file   Tobacco Use   • Smoking status: Former     Types: Cigarettes     Quit date: 2013     Years since quittin 2   • Smokeless tobacco: Never   Substance and Sexual Activity   • Alcohol use: Yes   • Drug use: Yes     Types: Marijuana   • Sexual activity: Not on file   Other Topics Concern   • Not on file   Social History Narrative   • Not on file     Social Determinants of Health     Financial Resource Strain: Low Risk    • Difficulty of Paying Living Expenses: Not hard at all   Food Insecurity: No Food Insecurity   • Worried About Running Out of Food in the Last Year: Never true   • Ran Out of Food in the Last Year: Never true   Transportation Needs: No Transportation Needs   • Lack of Transportation (Medical): No   • Lack of Transportation (Non-Medical):  No   Physical Activity: Not on file   Stress: Not on file   Social Connections: Not on file   Intimate Partner Violence: Not on file   Housing Stability: Not on file      Medications and Allergies:     Current Outpatient Medications   Medication Sig Dispense Refill   • fluticasone (FLONASE) 50 mcg/act nasal spray 1 spray into each nostril daily 11 1 mL 0   • Accu-Chek FastClix Lancets MISC Use 2 (two) times a day 100 each 5   • Blood Glucose Monitoring Suppl (Accu-Chek Guide) w/Device KIT Use 2 (two) times a day 1 kit 0   • glucose blood (Accu-Chek Guide) test strip Use as instructed 100 strip 5   • Januvia 100 MG tablet take 1 tablet by mouth once daily 90 tablet 1   • leflunomide (ARAVA) 20 MG tablet Take 20 mg by mouth daily  0   • loratadine (CLARITIN) 10 mg tablet Take 1 tablet (10 mg total) by mouth daily 100 tablet 3   • meloxicam (MOBIC) 15 mg tablet Take 1 tablet (15 mg total) by mouth daily 30 tablet 2   • metFORMIN (GLUCOPHAGE) 1000 MG tablet take 1 tablet by mouth twice a day with meals 180 tablet 1   • propranolol (INDERAL) 10 mg tablet      • QUEtiapine (SEROQUEL) 200 mg tablet daily as needed      • QUEtiapine (SEROquel) 300 mg tablet Take by mouth daily as needed     • Xeljanz XR 11 MG TB24 Take 1 tablet by mouth daily       No current facility-administered medications for this visit  No Known Allergies   Immunizations:     Immunization History   Administered Date(s) Administered   • INFLUENZA 10/29/2013, 10/27/2014, 10/27/2014, 10/20/2016, 10/20/2016, 10/18/2017, 10/29/2019   • Influenza Split 10/29/2013   • Influenza, injectable, quadrivalent, preservative free 0 5 mL 02/05/2019   • Influenza, recombinant, quadrivalent,injectable, preservative free 10/03/2022   • Pneumococcal Conjugate 13-Valent 02/05/2019   • Pneumococcal Conjugate Vaccine 20-valent (Pcv20), Polysace 10/03/2022   • Pneumococcal Polysaccharide PPV23 05/20/2014   • Tdap 03/28/2014, 07/07/2021      Health Maintenance:         Topic Date Due   • Colorectal Cancer Screening  11/20/2022   • HIV Screening  Completed   • Hepatitis C Screening  Completed         Topic Date Due   • Hepatitis B Vaccine (1 of 3 - 3-dose series) Never done   • COVID-19 Vaccine (1) Never done      Medicare Screening Tests and Risk Assessments:         Health Risk Assessment:   Patient rates overall health as poor  Patient feels that their physical health rating is same  Patient is very satisfied with their life  Eyesight was rated as same  Hearing was rated as same  Patient feels that their emotional and mental health rating is same  Patients states they are sometimes angry  Patient states they are sometimes unusually tired/fatigued  Pain experienced in the last 7 days has been some  Patient's pain rating has been 1/10  Patient states that he has experienced no weight loss or gain in last 6 months  Fall Risk Screening:    In the past year, patient has experienced: no history of falling in past year      Home Safety:  Patient does not have trouble with stairs inside or outside of their home  Patient has working smoke alarms and has working carbon monoxide detector  Home safety hazards include: none  Nutrition:   Current diet is Regular  Medications:   Patient is not currently taking any over-the-counter supplements  Patient is able to manage medications  Activities of Daily Living (ADLs)/Instrumental Activities of Daily Living (IADLs):   Walk and transfer into and out of bed and chair?: Yes  Dress and groom yourself?: Yes    Bathe or shower yourself?: Yes    Feed yourself? Yes  Do your laundry/housekeeping?: Yes  Manage your money, pay your bills and track your expenses?: Yes  Make your own meals?: Yes    Do your own shopping?: Yes    Previous Hospitalizations:   Any hospitalizations or ED visits within the last 12 months?: No      PREVENTIVE SCREENINGS      Cardiovascular Screening:    General: Screening Not Indicated and History Lipid Disorder      Diabetes Screening:     General: Screening Not Indicated and History Diabetes      Colorectal Cancer Screening:     General: Screening Current      Prostate Cancer Screening:    General: Screening Not Indicated      Osteoporosis Screening:    General: Screening Not Indicated      Abdominal Aortic Aneurysm (AAA) Screening:    Risk factors include: age between 73-67 yo and tobacco use        General: Screening Not Indicated      Lung Cancer Screening:     General: Screening Not Indicated      Hepatitis C Screening:    General: Screening Current    Screening, Brief Intervention, and Referral to Treatment (SBIRT)    Screening  Typical number of drinks in a day: 0  Typical number of drinks in a week: 0  Interpretation: Low risk drinking behavior  Other Counseling Topics:   Regular weightbearing exercise  No results found       Physical Exam:     /76 (BP Location: Left arm, Patient Position: Sitting)   Pulse 98   Temp (!) 96 9 °F (36 1 °C) (Temporal)   Resp 18   Ht 6' 1" (1 854 m)   Wt 78 9 kg (174 lb)   SpO2 96%   BMI 22 96 kg/m²     Physical Exam  Vitals and nursing note reviewed  Constitutional:       General: He is not in acute distress  Appearance: Normal appearance  He is not diaphoretic  HENT:      Head: Normocephalic and atraumatic  Nose: No rhinorrhea  Mouth/Throat:      Mouth: Mucous membranes are moist       Pharynx: No oropharyngeal exudate  Eyes:      Extraocular Movements: Extraocular movements intact  Conjunctiva/sclera: Conjunctivae normal       Pupils: Pupils are equal, round, and reactive to light  Cardiovascular:      Rate and Rhythm: Normal rate and regular rhythm  Pulses: Normal pulses  Heart sounds: Normal heart sounds  No murmur heard  Pulmonary:      Effort: Pulmonary effort is normal       Breath sounds: Normal breath sounds  No wheezing, rhonchi or rales  Abdominal:      General: Abdomen is flat  Bowel sounds are normal       Palpations: Abdomen is soft  Tenderness: There is no abdominal tenderness  Musculoskeletal:         General: No swelling or tenderness  Normal range of motion  Cervical back: Normal range of motion and neck supple  No muscular tenderness  Skin:     General: Skin is warm and dry  Capillary Refill: Capillary refill takes less than 2 seconds  Findings: No rash  Neurological:      General: No focal deficit present  Mental Status: He is alert and oriented to person, place, and time     Psychiatric:         Mood and Affect: Mood normal          Behavior: Behavior normal           Maribeth Street MD

## 2022-12-07 NOTE — ASSESSMENT & PLAN NOTE
· Follows with Dr Gabrielle Hanson of Rheumatology at [facility]  · Home medications: Alis Cloudcroft 1 tab daily, Meloixcam, Leflunomide 20 mg  · Continue home medications

## 2022-12-07 NOTE — ASSESSMENT & PLAN NOTE
· TAG's improved to normal to 108 from 438 (6/2021)  Marked improvement  Continue current diet and lifestyle changes

## 2022-12-30 ENCOUNTER — TELEPHONE (OUTPATIENT)
Dept: FAMILY MEDICINE CLINIC | Facility: CLINIC | Age: 47
End: 2022-12-30

## 2022-12-30 NOTE — TELEPHONE ENCOUNTER
12/30/22    first attempt to contact patient  no answer    Left detailed message  If pt contacts office, please assist pt with scheduling an appt  Pt did not attend and also canceled one of the 3 appt for his Nurse visit for Covid Vaccine, ask pt if that the reason of scheduling an appt  Pt already haves a f/u appt with pcp on 02/09/23  Please ask pt if there is another reason he is asking / left a message on the nurse line for a f/u appt, per Emmanuelle Linares

## 2023-01-05 ENCOUNTER — OFFICE VISIT (OUTPATIENT)
Dept: FAMILY MEDICINE CLINIC | Facility: CLINIC | Age: 48
End: 2023-01-05

## 2023-01-05 VITALS
OXYGEN SATURATION: 99 % | HEART RATE: 113 BPM | HEIGHT: 73 IN | RESPIRATION RATE: 18 BRPM | DIASTOLIC BLOOD PRESSURE: 70 MMHG | SYSTOLIC BLOOD PRESSURE: 118 MMHG | BODY MASS INDEX: 22.88 KG/M2 | TEMPERATURE: 97.6 F | WEIGHT: 172.6 LBS

## 2023-01-05 DIAGNOSIS — Z23 ENCOUNTER FOR IMMUNIZATION: ICD-10-CM

## 2023-01-05 DIAGNOSIS — R05.1 ACUTE COUGH: Primary | ICD-10-CM

## 2023-01-05 NOTE — PROGRESS NOTES
[facility]   [address] [facility] [address], [address]  Phone#  [phone]  Fax#  [phone]    ASSESSMENT and PLAN  Healthcare maintenance  · 4th COVID Booster administered today  · Flu vaccine UTD  · PCV 20 vaccine UTD   · Hep C normal  · Colonoscopy denied at this time would like to do it at 50  Acute cough  · Resolved  · Likely secondary to viral URI  · Lungs CTABL, No wheezes, rhonchi or rales  · No tobacco use and 4th COVID vaccine administered today  · Continue to monitor      Filled form out for [facility] stating it was okay he was donating  Last Hgb 12 5 11/2022  Diagnoses and all orders for this visit:    Encounter for immunization  -     Age 15 y+, BOOSTER (BIVALENT): Akila Han vac bivalent [id number]          HISTORY OF PRESENT ILLNESS  Dino Ethyl Saint  is a 41 y o  male with a significant PMHx of DMII, Asthma, Rheumatoid Arthritis  who presents to the clinic for f/u of cough and 4th COVID Booster shot  Cough since last visit has improved  Patient's medical conditions are stable unless noted otherwise above  Patient has not had any recent hospitalizations, or medical emergencies since last visit  Patient has no further complaints other than what is mentioned in the ROS  REVIEW OF SYSTEMS  Review of Systems   Constitutional: Negative for chills, fatigue, fever and unexpected weight change  HENT: Negative for congestion, rhinorrhea, sinus pressure and sore throat  Eyes: Negative for visual disturbance  Respiratory: Negative for cough, chest tightness, shortness of breath and wheezing  Cardiovascular: Negative for chest pain  Gastrointestinal: Negative for abdominal distention, abdominal pain, blood in stool, constipation, diarrhea, nausea and vomiting  Genitourinary: Negative for difficulty urinating, dysuria, frequency, hematuria and urgency  Musculoskeletal: Negative for back pain and neck pain  Skin: Negative for rash  Allergic/Immunologic: Negative  Neurological: Negative for dizziness, weakness, light-headedness, numbness and headaches  Psychiatric/Behavioral: Negative for behavioral problems  PAST MEDICAL HISTORY   Past Medical History:   Diagnosis Date   • Abscess of lower leg 2020    Management per cellulitis above  • Back muscle spasm 10/21/2021   • Dental abscess 3/14/2022   • Elbow laceration, left, initial encounter 2021   • Foot pain, bilateral 2018   • GERD (gastroesophageal reflux disease) 2019    Presentation suspicious for GERD Will admister 8 week trial of PPi and workup as needed  • Venous stasis dermatitis of left lower extremity 2019     No past surgical history on file  Social History     Socioeconomic History   • Marital status: Single     Spouse name: Not on file   • Number of children: Not on file   • Years of education: Not on file   • Highest education level: Not on file   Occupational History   • Not on file   Tobacco Use   • Smoking status: Former     Types: Cigarettes     Quit date: 2013     Years since quittin 3   • Smokeless tobacco: Never   Substance and Sexual Activity   • Alcohol use: Yes   • Drug use: Yes     Types: Marijuana   • Sexual activity: Not on file   Other Topics Concern   • Not on file   Social History Narrative   • Not on file     Social Determinants of Health     Financial Resource Strain: Low Risk    • Difficulty of Paying Living Expenses: Not hard at all   Food Insecurity: No Food Insecurity   • Worried About Running Out of Food in the Last Year: Never true   • Ran Out of Food in the Last Year: Never true   Transportation Needs: No Transportation Needs   • Lack of Transportation (Medical): No   • Lack of Transportation (Non-Medical):  No   Physical Activity: Not on file   Stress: Not on file   Social Connections: Not on file   Intimate Partner Violence: Not on file   Housing Stability: Not on file     No family history on file     MEDICATIONS    Current Outpatient Medications:   •  Accu-Chek FastClix Lancets MISC, Use 2 (two) times a day, Disp: 100 each, Rfl: 5  •  Blood Glucose Monitoring Suppl (Accu-Chek Guide) w/Device KIT, Use 2 (two) times a day, Disp: 1 kit, Rfl: 0  •  fluticasone (FLONASE) 50 mcg/act nasal spray, 1 spray into each nostril daily, Disp: 11 1 mL, Rfl: 0  •  glucose blood (Accu-Chek Guide) test strip, Use as instructed, Disp: 100 strip, Rfl: 5  •  Januvia 100 MG tablet, take 1 tablet by mouth once daily, Disp: 90 tablet, Rfl: 1  •  leflunomide (ARAVA) 20 MG tablet, Take 20 mg by mouth daily, Disp: , Rfl: 0  •  loratadine (CLARITIN) 10 mg tablet, Take 1 tablet (10 mg total) by mouth daily, Disp: 100 tablet, Rfl: 3  •  meloxicam (MOBIC) 15 mg tablet, Take 1 tablet (15 mg total) by mouth daily, Disp: 30 tablet, Rfl: 2  •  metFORMIN (GLUCOPHAGE) 1000 MG tablet, take 1 tablet by mouth twice a day with meals, Disp: 180 tablet, Rfl: 1  •  propranolol (INDERAL) 10 mg tablet, , Disp: , Rfl:   •  QUEtiapine (SEROQUEL) 200 mg tablet, daily as needed , Disp: , Rfl:   •  QUEtiapine (SEROquel) 300 mg tablet, Take by mouth daily as needed, Disp: , Rfl:   •  Xeljanz XR 11 MG TB24, Take 1 tablet by mouth daily, Disp: , Rfl:     PHYSICAL EXAM  Vitals:    01/05/23 1447   BP: 118/70   BP Location: Left arm   Patient Position: Sitting   Cuff Size: Standard   Pulse: (!) 113   Resp: 18   Temp: 97 6 °F (36 4 °C)   TempSrc: Temporal   SpO2: 99%   Weight: 78 3 kg (172 lb 9 6 oz)   Height: 6' 1" (1 854 m)     Wt Readings from Last 3 Encounters:   01/05/23 78 3 kg (172 lb 9 6 oz)   12/07/22 78 9 kg (174 lb)   10/03/22 79 6 kg (175 lb 6 4 oz)    , Body mass index is 22 77 kg/m²  Physical Exam  Vitals and nursing note reviewed  Constitutional:       General: He is not in acute distress  Appearance: He is well-developed  He is not toxic-appearing  HENT:      Head: Normocephalic and atraumatic     Eyes:      Extraocular Movements: Extraocular movements intact  Pupils: Pupils are equal, round, and reactive to light  Cardiovascular:      Rate and Rhythm: Normal rate and regular rhythm  Heart sounds: Normal heart sounds  No murmur heard  Pulmonary:      Effort: Pulmonary effort is normal  No respiratory distress  Breath sounds: Normal breath sounds  No wheezing, rhonchi or rales  Abdominal:      General: Bowel sounds are normal  There is no distension  Palpations: Abdomen is soft  Tenderness: There is no abdominal tenderness  There is no guarding  Musculoskeletal:         General: Normal range of motion  Cervical back: Normal range of motion and neck supple  Skin:     General: Skin is warm and dry  Neurological:      Mental Status: He is alert and oriented to person, place, and time  Psychiatric:         Behavior: Behavior normal          LABS/IMAGING  Hemoglobin A1C   Date Value Ref Range Status   10/31/2022 6 3 (H) Normal 3 8-5 6%; PreDiabetic 5 7-6 4%; Diabetic >=6 5%; Glycemic control for adults with diabetes <7 0% % Final     HDL, Direct   Date Value Ref Range Status   10/31/2022 47 >=40 mg/dL Final     Comment:     Specimen collection should occur prior to Metamizole administration due to the potential for falsley depressed results  Triglycerides   Date Value Ref Range Status   10/31/2022 108 See Comment mg/dL Final     Comment:     Triglyceride:     0-9Y            <75mg/dL     10Y-17Y         <90 mg/dL       >=18Y     Normal          <150 mg/dL     Borderline High 150-199 mg/dL     High            200-499 mg/dL        Very High       >499 mg/dL    Specimen collection should occur prior to N-Acetylcysteine or Metamizole administration due to the potential for falsely depressed results        WBC   Date Value Ref Range Status   11/16/2022 6 02 4 31 - 10 16 Thousand/uL Final   06/28/2022 6 19 4 31 - 10 16 Thousand/uL Final   01/05/2022 6 20 4 50 - 11 00 Thousand/uL Final   06/07/2018 6 8 4 5 - 11 0 K/MCL Final     Hemoglobin   Date Value Ref Range Status   11/16/2022 12 5 12 0 - 17 0 g/dL Final   06/28/2022 12 7 12 0 - 17 0 g/dL Final   01/05/2022 13 5 13 5 - 17 5 g/dL Final   06/07/2018 13 5 13 5 - 17 5 G/DL Final     Platelets   Date Value Ref Range Status   11/16/2022 296 149 - 390 Thousands/uL Final   06/28/2022 322 149 - 390 Thousands/uL Final   01/05/2022 349 150 - 450 Thousands/uL Final   06/07/2018 264 150 - 450 K/MCL Final     Potassium   Date Value Ref Range Status   01/05/2021 4 0 3 6 - 5 0 mmol/L Final   07/28/2020 3 8 3 6 - 5 0 mmol/L Final   02/22/2019 3 7 3 6 - 5 0 mmol/L Final     Chloride   Date Value Ref Range Status   01/05/2021 105 97 - 108 mmol/L Final   07/28/2020 104 97 - 108 mmol/L Final   02/22/2019 100 97 - 108 mmol/L Final     CO2   Date Value Ref Range Status   01/05/2021 28 22 - 30 mmol/L Final   07/28/2020 27 22 - 30 mmol/L Final   02/22/2019 28 22 - 30 mmol/L Final     BUN   Date Value Ref Range Status   01/05/2021 9 5 - 25 mg/dL Final   07/28/2020 8 5 - 25 mg/dL Final   02/22/2019 8 5 - 25 mg/dL Final     Creatinine   Date Value Ref Range Status   11/16/2022 0 68 (L) 0 70 - 1 50 mg/dL Final     Comment:     Standardized to IDMS reference method   06/28/2022 0 61 (L) 0 70 - 1 50 mg/dL Final     Comment:     Standardized to IDMS reference method   01/05/2022 0 67 (L) 0 70 - 1 50 mg/dL Final     Comment:     Standardized to IDMS reference method     eGFR   Date Value Ref Range Status   11/16/2022 116 ml/min/1 73sq m Final   06/28/2022 121 ml/min/1 73sq m Final   01/05/2022 116 ml/min/1 73sq m Final     Calcium   Date Value Ref Range Status   01/05/2021 9 3 8 4 - 10 2 mg/dL Final   07/28/2020 9 3 8 4 - 10 2 mg/dL Final   02/22/2019 10 0 8 4 - 10 2 mg/dL Final     AST   Date Value Ref Range Status   11/16/2022 20 17 - 59 U/L Final     Comment:     Specimen collection should occur prior to Sulfasalazine administration due to the potential for falsely depressed results  06/28/2022 16 (L) 17 - 59 U/L Final     Comment:     Specimen collection should occur prior to Sulfasalazine administration due to the potential for falsely depressed results  01/05/2022 13 (L) 17 - 59 U/L Final     Comment:     Specimen collection should occur prior to Sulfasalazine administration due to the potential for falsely depressed results  06/07/2018 12 (L) 17 - 59 U/L Final     ALT   Date Value Ref Range Status   11/16/2022 22 <50 U/L Final   06/28/2022 19 <50 U/L Final   01/05/2022 15 <50 U/L Final   06/07/2018 25 9 - 52 U/L Final     Alkaline Phosphatase   Date Value Ref Range Status   01/05/2021 77 43 - 122 U/L Final   07/28/2020 87 43 - 122 U/L Final   02/22/2019 98 43 - 122 U/L Final     Magnesium   Date Value Ref Range Status   02/22/2019 1 9 1 6 - 2 3 mg/dL Final     No results found for: TSH    This note has been dictated using Astrapi software  It may contain errors, including improperly dictated words  Please contact physician directly for any questions       Braeden Campos MD   PGY-3

## 2023-01-06 NOTE — ASSESSMENT & PLAN NOTE
· 4th COVID Booster administered today  · Flu vaccine UTD  · PCV 20 vaccine UTD   · Hep C normal  · Colonoscopy denied at this time would like to do it at 50

## 2023-01-06 NOTE — ASSESSMENT & PLAN NOTE
· Resolved  · Likely secondary to viral URI  · Lungs CTABL, No wheezes, rhonchi or rales  · No tobacco use and 4th COVID vaccine administered today  · Continue to monitor

## 2023-02-05 PROBLEM — Z00.00 HEALTHCARE MAINTENANCE: Status: RESOLVED | Noted: 2022-10-03 | Resolved: 2023-02-05

## 2023-02-09 PROBLEM — Z91.09 ENVIRONMENTAL ALLERGIES: Status: RESOLVED | Noted: 2021-06-17 | Resolved: 2023-02-09

## 2023-02-16 ENCOUNTER — OFFICE VISIT (OUTPATIENT)
Dept: FAMILY MEDICINE CLINIC | Facility: CLINIC | Age: 48
End: 2023-02-16

## 2023-02-16 VITALS
SYSTOLIC BLOOD PRESSURE: 148 MMHG | HEIGHT: 73 IN | DIASTOLIC BLOOD PRESSURE: 98 MMHG | OXYGEN SATURATION: 96 % | WEIGHT: 171 LBS | RESPIRATION RATE: 16 BRPM | BODY MASS INDEX: 22.66 KG/M2 | TEMPERATURE: 96 F | HEART RATE: 118 BPM

## 2023-02-16 DIAGNOSIS — E11.8 TYPE 2 DIABETES MELLITUS WITH COMPLICATION, WITHOUT LONG-TERM CURRENT USE OF INSULIN (HCC): Primary | ICD-10-CM

## 2023-02-16 DIAGNOSIS — Z91.09 ENVIRONMENTAL ALLERGIES: ICD-10-CM

## 2023-02-16 DIAGNOSIS — J30.9 ALLERGIC RHINITIS, UNSPECIFIED SEASONALITY, UNSPECIFIED TRIGGER: ICD-10-CM

## 2023-02-16 DIAGNOSIS — R05.1 ACUTE COUGH: ICD-10-CM

## 2023-02-16 DIAGNOSIS — J34.2 DEVIATED SEPTUM: ICD-10-CM

## 2023-02-16 LAB — SL AMB POCT HEMOGLOBIN AIC: 7.6 (ref ?–6.5)

## 2023-02-16 RX ORDER — ALBUTEROL SULFATE 90 UG/1
2 AEROSOL, METERED RESPIRATORY (INHALATION) EVERY 6 HOURS PRN
Qty: 18 G | Refills: 1 | Status: SHIPPED | OUTPATIENT
Start: 2023-02-16

## 2023-02-16 RX ORDER — LORATADINE 10 MG/1
10 TABLET ORAL DAILY
Qty: 100 TABLET | Refills: 0 | Status: SHIPPED | OUTPATIENT
Start: 2023-02-16

## 2023-02-16 RX ORDER — AZITHROMYCIN 250 MG/1
TABLET, FILM COATED ORAL
Qty: 6 TABLET | Refills: 0 | Status: SHIPPED | OUTPATIENT
Start: 2023-02-16 | End: 2023-02-21

## 2023-02-16 NOTE — ASSESSMENT & PLAN NOTE
· A1C today 7 6 10/31/22: 6 3-controlled (previous 8 1 7/2022, 4/4/22: 11 Last A1c-9 6 Goal A1C<7  · Last Micro/Cr: wnl  · Home medications: Metformin 1000 mg BID, Januvia 100 mg qd, stopped Pioglitazone 30 mg after last A1C  · PCV 20 UTD   · Continue Metformin 1,000 mg BID, Januvia 100 mg qd  · Not interested in starting statin medication at this time

## 2023-02-16 NOTE — ASSESSMENT & PLAN NOTE
· Subacute cough now x2 months  Nonproductive, dry cough that is not associated with any time of day  Also associated congestion, postnasal drip, rhinorrhea, watery eyes  Denies any fevers, chills, shortness of breath, recent travel, chest pain  · Physical exam significant for diffuse inspiratory and expiratory rhonchi, wheezing mild  · Differential diagnosis includes, asthma, allergic rhinitis, bronchitis, viral pneumonia, bacterial pneumonia, pertussis  · Refer to ENT for deviated septum and significant erythematous nasal passage with noticeable polyps  · We will order chest x-ray, CBC, BMP, IgE  · Recommend use of albuterol inhaler as needed to see if symptoms improve  Spacer also ordered  · Showcase how to use Flonase properly and will utilize that 1 puff each nostril daily to twice daily x14 days  We will also start Claritin 10 mg daily  · We will give azithromycin 500 mg x 1 day, 250 mg x 4 days in the setting of suspected bronchitis  · If work-up is normal and negative and there is no improvement with medications will test for pertussis    Also consider utilizing montelukast if there is no improvement in symptoms

## 2023-02-16 NOTE — PROGRESS NOTES
[facility]   [address]  [place], [address]  Phone#  [phone]  Fax#  [phone]    ASSESSMENT and PLAN  Type 2 diabetes mellitus with complication, without long-term current use of insulin (HCC)  · A1C today 7 6 10/31/22: 6 3-controlled (previous 8 1 7/2022, 4/4/22: 11 Last A1c-9 6 Goal A1C<7  · Last Micro/Cr: wnl  · Home medications: Metformin 1000 mg BID, Januvia 100 mg qd, stopped Pioglitazone 30 mg after last A1C  · PCV 20 UTD   · Continue Metformin 1,000 mg BID, Januvia 100 mg qd  · Not interested in starting statin medication at this time     Acute cough  · Subacute cough now x2 months  Nonproductive, dry cough that is not associated with any time of day  Also associated congestion, postnasal drip, rhinorrhea, watery eyes  Denies any fevers, chills, shortness of breath, recent travel, chest pain  · Physical exam significant for diffuse inspiratory and expiratory rhonchi, wheezing mild  · Differential diagnosis includes, asthma, allergic rhinitis, bronchitis, viral pneumonia, bacterial pneumonia, pertussis  · Refer to ENT for deviated septum and significant erythematous nasal passage with noticeable polyps  · We will order chest x-ray, CBC, BMP, IgE  · Recommend use of albuterol inhaler as needed to see if symptoms improve  Spacer also ordered  · Showcase how to use Flonase properly and will utilize that 1 puff each nostril daily to twice daily x14 days  We will also start Claritin 10 mg daily  · We will give azithromycin 500 mg x 1 day, 250 mg x 4 days in the setting of suspected bronchitis  · If work-up is normal and negative and there is no improvement with medications will test for pertussis    Also consider utilizing montelukast if there is no improvement in symptoms      Diagnoses and all orders for this visit:    Type 2 diabetes mellitus with complication, without long-term current use of insulin (HCC)  -     POCT hemoglobin A1c    Environmental allergies  -     loratadine (CLARITIN) 10 mg tablet; Take 1 tablet (10 mg total) by mouth daily  -     Ambulatory Referral to Otolaryngology; Future    Allergic rhinitis, unspecified seasonality, unspecified trigger  -     Ambulatory Referral to Otolaryngology; Future    Deviated septum  -     Ambulatory Referral to Otolaryngology; Future    Acute cough  -     XR chest pa & lateral; Future  -     CBC and differential; Future  -     Basic metabolic panel; Future  -     Cancel: [facility] Allergy Panel, Adult; Future  -     azithromycin (Zithromax) 250 mg tablet; Take 2 tablets (500 mg total) by mouth daily for 1 day, THEN 1 tablet (250 mg total) daily for 4 days  -     albuterol (Ventolin HFA) 90 mcg/act inhaler; Inhale 2 puffs every 6 (six) hours as needed for wheezing  -     Spacer Device for Inhaler  -     IgE; Future        HISTORY OF PRESENT ILLNESS  Jamie Arteaga  is a 41 y o  male with a significant PMHx of diabetes mellitus type 2, hypertriglyceridemia, rheumatoid arthritis who presents to the clinic for  management of their chronic medical conditions  Patient still is having a dry, nonproductive cough that has been occurring for close to 2 months now  Over-the-counter medication such as Tylenol and Benadryl have not been working  He notes some periods of improvement and then some days where there is no improvement in symptoms or worse  He complains of rhinorrhea, watery eyes, congestion, cough, postnasal drip  Denies any fevers, chills, chest pain, shortness of breath  No recent travel, no recent COVID or flu illness  Patient's medical conditions are stable unless noted otherwise above  Patient has not had any recent hospitalizations, or medical emergencies since last visit  Patient has no further complaints other than what is mentioned in the ROS      Smoking/Alcohol/Illicit Drug Use: Does endorse smoking marijuana    REVIEW OF SYSTEMS  Review of Systems   Constitutional: Negative for chills, fatigue, fever and unexpected weight change  HENT: Positive for congestion, postnasal drip, rhinorrhea and sinus pressure  Negative for sore throat  Eyes: Negative for visual disturbance  Respiratory: Positive for cough  Negative for chest tightness, shortness of breath and wheezing  Cardiovascular: Negative for chest pain  Gastrointestinal: Negative for abdominal distention, abdominal pain, blood in stool, constipation, diarrhea, nausea and vomiting  Genitourinary: Negative for difficulty urinating, dysuria, frequency, hematuria and urgency  Musculoskeletal: Negative for back pain and neck pain  Skin: Negative for rash  Allergic/Immunologic: Negative  Neurological: Negative for dizziness, weakness, light-headedness, numbness and headaches  Psychiatric/Behavioral: Negative for behavioral problems  PAST MEDICAL HISTORY   Past Medical History:   Diagnosis Date   • Abscess of lower leg 2020    Management per cellulitis above  • Back muscle spasm 10/21/2021   • Dental abscess 3/14/2022   • Elbow laceration, left, initial encounter 2021   • Foot pain, bilateral 2018   • GERD (gastroesophageal reflux disease) 2019    Presentation suspicious for GERD Will admister 8 week trial of PPi and workup as needed  • Venous stasis dermatitis of left lower extremity 2019     No past surgical history on file  Social History     Socioeconomic History   • Marital status: Single     Spouse name: Not on file   • Number of children: Not on file   • Years of education: Not on file   • Highest education level: Not on file   Occupational History   • Not on file   Tobacco Use   • Smoking status: Former     Types: Cigarettes     Quit date: 2013     Years since quittin 4   • Smokeless tobacco: Never   Substance and Sexual Activity   • Alcohol use:  Yes   • Drug use: Yes     Types: Marijuana   • Sexual activity: Not on file   Other Topics Concern   • Not on file   Social History Narrative   • Not on file     Social Determinants of Health     Financial Resource Strain: Low Risk    • Difficulty of Paying Living Expenses: Not hard at all   Food Insecurity: No Food Insecurity   • Worried About [address] in the Last Year: Never true   • Ran Out of Food in the Last Year: Never true   Transportation Needs: No Transportation Needs   • Lack of Transportation (Medical): No   • Lack of Transportation (Non-Medical): No   Physical Activity: Not on file   Stress: Not on file   Social Connections: Not on file   Intimate Partner Violence: Not on file   Housing Stability: Not on file     No family history on file  MEDICATIONS    Current Outpatient Medications:   •  albuterol (Ventolin HFA) 90 mcg/act inhaler, Inhale 2 puffs every 6 (six) hours as needed for wheezing, Disp: 18 g, Rfl: 1  •  azithromycin (Zithromax) 250 mg tablet, Take 2 tablets (500 mg total) by mouth daily for 1 day, THEN 1 tablet (250 mg total) daily for 4 days  , Disp: 6 tablet, Rfl: 0  •  loratadine (CLARITIN) 10 mg tablet, Take 1 tablet (10 mg total) by mouth daily, Disp: 100 tablet, Rfl: 0  •  Accu-Chek FastClix Lancets MISC, Use 2 (two) times a day, Disp: 100 each, Rfl: 5  •  Blood Glucose Monitoring Suppl (Accu-Chek Guide) w/Device KIT, Use 2 (two) times a day, Disp: 1 kit, Rfl: 0  •  fluticasone (FLONASE) 50 mcg/act nasal spray, 1 spray into each nostril daily, Disp: 11 1 mL, Rfl: 0  •  glucose blood (Accu-Chek Guide) test strip, Use as instructed, Disp: 100 strip, Rfl: 5  •  Januvia 100 MG tablet, take 1 tablet by mouth once daily, Disp: 90 tablet, Rfl: 1  •  leflunomide (ARAVA) 20 MG tablet, Take 20 mg by mouth daily, Disp: , Rfl: 0  •  meloxicam (MOBIC) 15 mg tablet, Take 1 tablet (15 mg total) by mouth daily, Disp: 30 tablet, Rfl: 2  •  metFORMIN (GLUCOPHAGE) 1000 MG tablet, take 1 tablet by mouth twice a day with meals, Disp: 180 tablet, Rfl: 1  •  propranolol (INDERAL) 10 mg tablet, , Disp: , Rfl:   •  QUEtiapine (SEROQUEL) 200 mg tablet, daily as needed , Disp: , Rfl:   •  QUEtiapine (SEROquel) 300 mg tablet, Take by mouth daily as needed, Disp: , Rfl:   •  Xeljanz XR 11 MG TB24, Take 1 tablet by mouth daily, Disp: , Rfl:     PHYSICAL EXAM  Vitals:    02/16/23 1611   BP: 148/98   BP Location: Left arm   Patient Position: Sitting   Cuff Size: Standard   Pulse: (!) 118   Resp: 16   Temp: (!) 96 °F (35 6 °C)   TempSrc: Temporal   SpO2: 96%   Weight: 77 6 kg (171 lb)   Height: 6' 1" (1 854 m)     Wt Readings from Last 3 Encounters:   02/16/23 77 6 kg (171 lb)   01/05/23 78 3 kg (172 lb 9 6 oz)   12/07/22 78 9 kg (174 lb)    , Body mass index is 22 56 kg/m²  Physical Exam  Vitals and nursing note reviewed  Constitutional:       General: He is not in acute distress  Appearance: He is well-developed  He is not toxic-appearing  HENT:      Head: Normocephalic and atraumatic  Nose: Septal deviation, congestion and rhinorrhea present  Right Turbinates: Enlarged and swollen  Left Turbinates: Enlarged and swollen  Eyes:      Extraocular Movements: Extraocular movements intact  Pupils: Pupils are equal, round, and reactive to light  Cardiovascular:      Rate and Rhythm: Normal rate and regular rhythm  Heart sounds: Normal heart sounds  No murmur heard  Pulmonary:      Effort: Pulmonary effort is normal  No respiratory distress  Breath sounds: Examination of the right-upper field reveals rhonchi  Examination of the left-upper field reveals rhonchi  Examination of the right-middle field reveals rhonchi  Examination of the left-middle field reveals rhonchi  Examination of the right-lower field reveals wheezing and rhonchi  Examination of the left-lower field reveals wheezing and rhonchi  Wheezing and rhonchi present  No rales  Abdominal:      General: Bowel sounds are normal  There is no distension  Palpations: Abdomen is soft  Tenderness: There is no abdominal tenderness   There is no guarding  Musculoskeletal:         General: Normal range of motion  Cervical back: Normal range of motion and neck supple  Skin:     General: Skin is warm and dry  Neurological:      Mental Status: He is alert and oriented to person, place, and time  Psychiatric:         Behavior: Behavior normal        LABS/IMAGING  Hemoglobin A1C   Date Value Ref Range Status   02/16/2023 7 6 (A) 6 5 Final   10/31/2022 6 3 (H) Normal 3 8-5 6%; PreDiabetic 5 7-6 4%; Diabetic >=6 5%; Glycemic control for adults with diabetes <7 0% % Final     HDL, Direct   Date Value Ref Range Status   10/31/2022 47 >=40 mg/dL Final     Comment:     Specimen collection should occur prior to Metamizole administration due to the potential for falsley depressed results  Triglycerides   Date Value Ref Range Status   10/31/2022 108 See Comment mg/dL Final     Comment:     Triglyceride:     0-9Y            <75mg/dL     10Y-17Y         <90 mg/dL       >=18Y     Normal          <150 mg/dL     Borderline High 150-199 mg/dL     High            200-499 mg/dL        Very High       >499 mg/dL    Specimen collection should occur prior to N-Acetylcysteine or Metamizole administration due to the potential for falsely depressed results        WBC   Date Value Ref Range Status   11/16/2022 6 02 4 31 - 10 16 Thousand/uL Final   06/28/2022 6 19 4 31 - 10 16 Thousand/uL Final   01/05/2022 6 20 4 50 - 11 00 Thousand/uL Final   06/07/2018 6 8 4 5 - 11 0 K/MCL Final     Hemoglobin   Date Value Ref Range Status   11/16/2022 12 5 12 0 - 17 0 g/dL Final   06/28/2022 12 7 12 0 - 17 0 g/dL Final   01/05/2022 13 5 13 5 - 17 5 g/dL Final   06/07/2018 13 5 13 5 - 17 5 G/DL Final     Platelets   Date Value Ref Range Status   11/16/2022 296 149 - 390 Thousands/uL Final   06/28/2022 322 149 - 390 Thousands/uL Final   01/05/2022 349 150 - 450 Thousands/uL Final   06/07/2018 264 150 - 450 K/MCL Final     Potassium   Date Value Ref Range Status   01/05/2021 4 0 3 6 - 5 0 mmol/L Final   07/28/2020 3 8 3 6 - 5 0 mmol/L Final   02/22/2019 3 7 3 6 - 5 0 mmol/L Final     Chloride   Date Value Ref Range Status   01/05/2021 105 97 - 108 mmol/L Final   07/28/2020 104 97 - 108 mmol/L Final   02/22/2019 100 97 - 108 mmol/L Final     CO2   Date Value Ref Range Status   01/05/2021 28 22 - 30 mmol/L Final   07/28/2020 27 22 - 30 mmol/L Final   02/22/2019 28 22 - 30 mmol/L Final     BUN   Date Value Ref Range Status   01/05/2021 9 5 - 25 mg/dL Final   07/28/2020 8 5 - 25 mg/dL Final   02/22/2019 8 5 - 25 mg/dL Final     Creatinine   Date Value Ref Range Status   11/16/2022 0 68 (L) 0 70 - 1 50 mg/dL Final     Comment:     Standardized to IDMS reference method   06/28/2022 0 61 (L) 0 70 - 1 50 mg/dL Final     Comment:     Standardized to IDMS reference method   01/05/2022 0 67 (L) 0 70 - 1 50 mg/dL Final     Comment:     Standardized to IDMS reference method     eGFR   Date Value Ref Range Status   11/16/2022 116 ml/min/1 73sq m Final   06/28/2022 121 ml/min/1 73sq m Final   01/05/2022 116 ml/min/1 73sq m Final     Calcium   Date Value Ref Range Status   01/05/2021 9 3 8 4 - 10 2 mg/dL Final   07/28/2020 9 3 8 4 - 10 2 mg/dL Final   02/22/2019 10 0 8 4 - 10 2 mg/dL Final     AST   Date Value Ref Range Status   11/16/2022 20 17 - 59 U/L Final     Comment:     Specimen collection should occur prior to Sulfasalazine administration due to the potential for falsely depressed results  06/28/2022 16 (L) 17 - 59 U/L Final     Comment:     Specimen collection should occur prior to Sulfasalazine administration due to the potential for falsely depressed results  01/05/2022 13 (L) 17 - 59 U/L Final     Comment:     Specimen collection should occur prior to Sulfasalazine administration due to the potential for falsely depressed results      06/07/2018 12 (L) 17 - 59 U/L Final     ALT   Date Value Ref Range Status   11/16/2022 22 <50 U/L Final   06/28/2022 19 <50 U/L Final   01/05/2022 15 <50 U/L Final 06/07/2018 25 9 - 52 U/L Final     Alkaline Phosphatase   Date Value Ref Range Status   01/05/2021 77 43 - 122 U/L Final   07/28/2020 87 43 - 122 U/L Final   02/22/2019 98 43 - 122 U/L Final     Magnesium   Date Value Ref Range Status   02/22/2019 1 9 1 6 - 2 3 mg/dL Final     No results found for: TSH    This note has been dictated using Datacastle software  It may contain errors, including improperly dictated words  Please contact physician directly for any questions       Jean-Pierre Ramírez MD   PGY-3

## 2023-02-20 ENCOUNTER — LAB (OUTPATIENT)
Dept: LAB | Facility: HOSPITAL | Age: 48
End: 2023-02-20

## 2023-02-20 ENCOUNTER — HOSPITAL ENCOUNTER (OUTPATIENT)
Dept: RADIOLOGY | Facility: HOSPITAL | Age: 48
Discharge: HOME | End: 2023-02-20

## 2023-02-20 DIAGNOSIS — R05.2 SUBACUTE COUGH: Primary | ICD-10-CM

## 2023-02-20 DIAGNOSIS — Z51.81 ENCOUNTER FOR THERAPEUTIC DRUG MONITORING: ICD-10-CM

## 2023-02-20 DIAGNOSIS — R05.1 ACUTE COUGH: ICD-10-CM

## 2023-02-20 DIAGNOSIS — Z79.899 ENCOUNTER FOR LONG-TERM (CURRENT) USE OF OTHER MEDICATIONS: ICD-10-CM

## 2023-02-20 LAB
ALBUMIN SERPL BCP-MCNC: 4.3 G/DL (ref 3.5–5)
ALT SERPL W P-5'-P-CCNC: 19 U/L
ANION GAP SERPL CALCULATED.3IONS-SCNC: 11 MMOL/L (ref 5–14)
AST SERPL W P-5'-P-CCNC: 21 U/L (ref 17–59)
BASOPHILS # BLD MANUAL: 0 THOUSAND/UL (ref 0–0.1)
BASOPHILS NFR MAR MANUAL: 0 % (ref 0–1)
BUN SERPL-MCNC: 15 MG/DL (ref 5–25)
CALCIUM SERPL-MCNC: 9.1 MG/DL (ref 8.4–10.2)
CHLORIDE SERPL-SCNC: 101 MMOL/L (ref 96–108)
CO2 SERPL-SCNC: 23 MMOL/L (ref 21–32)
CREAT SERPL-MCNC: 0.61 MG/DL (ref 0.7–1.5)
EOSINOPHIL # BLD MANUAL: 0 THOUSAND/UL (ref 0–0.4)
EOSINOPHIL NFR BLD MANUAL: 0 % (ref 0–6)
ERYTHROCYTE [DISTWIDTH] IN BLOOD BY AUTOMATED COUNT: 12.1 % (ref 11.6–15.1)
GFR SERPL CREATININE-BSD FRML MDRD: 120 ML/MIN/1.73SQ M
GLUCOSE P FAST SERPL-MCNC: 338 MG/DL (ref 70–99)
HCT VFR BLD AUTO: 39.8 % (ref 36.5–49.3)
HGB BLD-MCNC: 13.2 G/DL (ref 12–17)
LYMPHOCYTES # BLD AUTO: 0.26 THOUSAND/UL (ref 0.6–4.47)
LYMPHOCYTES # BLD AUTO: 3 % (ref 14–44)
MCH RBC QN AUTO: 31.2 PG (ref 26.8–34.3)
MCHC RBC AUTO-ENTMCNC: 33.2 G/DL (ref 31.4–37.4)
MCV RBC AUTO: 94 FL (ref 82–98)
MONOCYTES # BLD AUTO: 0.09 THOUSAND/UL (ref 0–1.22)
MONOCYTES NFR BLD: 1 % (ref 4–12)
NEUTROPHILS # BLD MANUAL: 8.41 THOUSAND/UL (ref 1.85–7.62)
NEUTS BAND NFR BLD MANUAL: 5 % (ref 0–8)
NEUTS SEG NFR BLD AUTO: 91 % (ref 43–75)
PLATELET # BLD AUTO: 346 THOUSANDS/UL (ref 149–390)
PLATELET BLD QL SMEAR: ADEQUATE
PMV BLD AUTO: 10.9 FL (ref 8.9–12.7)
POTASSIUM SERPL-SCNC: 4.7 MMOL/L (ref 3.5–5.3)
RBC # BLD AUTO: 4.23 MILLION/UL (ref 3.88–5.62)
RBC MORPH BLD: NORMAL
SODIUM SERPL-SCNC: 135 MMOL/L (ref 135–147)
TOTAL CELLS COUNTED SPEC: 100
WBC # BLD AUTO: 8.76 THOUSAND/UL (ref 4.31–10.16)

## 2023-02-21 LAB — TOTAL IGE SMQN RAST: 69 KU/L (ref 0–113)

## 2023-02-27 ENCOUNTER — TELEPHONE (OUTPATIENT)
Dept: FAMILY MEDICINE CLINIC | Facility: CLINIC | Age: 48
End: 2023-02-27

## 2023-03-06 PROBLEM — R05.1 ACUTE COUGH: Status: RESOLVED | Noted: 2023-01-05 | Resolved: 2023-03-06

## 2023-04-10 PROBLEM — Z00.00 HEALTHCARE MAINTENANCE: Status: RESOLVED | Noted: 2022-10-03 | Resolved: 2023-04-10

## 2023-05-17 DIAGNOSIS — E11.9 TYPE 2 DIABETES MELLITUS WITHOUT COMPLICATION, WITHOUT LONG-TERM CURRENT USE OF INSULIN (HCC): ICD-10-CM

## 2023-05-17 DIAGNOSIS — E11.8 TYPE 2 DIABETES MELLITUS WITH COMPLICATION, WITHOUT LONG-TERM CURRENT USE OF INSULIN (HCC): ICD-10-CM

## 2023-05-17 RX ORDER — SITAGLIPTIN 100 MG/1
TABLET, FILM COATED ORAL
Qty: 90 TABLET | Refills: 1 | Status: SHIPPED | OUTPATIENT
Start: 2023-05-17

## 2023-06-22 PROBLEM — M54.50 ACUTE LEFT-SIDED LOW BACK PAIN WITHOUT SCIATICA: Status: RESOLVED | Noted: 2022-03-29 | Resolved: 2023-06-22

## 2023-06-26 ENCOUNTER — TELEPHONE (OUTPATIENT)
Dept: FAMILY MEDICINE CLINIC | Facility: CLINIC | Age: 48
End: 2023-06-26

## 2023-06-26 NOTE — TELEPHONE ENCOUNTER
Name is Ishaan Felix, patients of Doctor [facility]  I've been trying to call this since I missed my appointment  I need to get it rescheduled immediately  Phone number is [phone]  Please call me at your earliest convenience   Thank you      Called pt back, he is scheduled for 6/28

## 2023-06-28 ENCOUNTER — OFFICE VISIT (OUTPATIENT)
Dept: FAMILY MEDICINE CLINIC | Facility: CLINIC | Age: 48
End: 2023-06-28

## 2023-06-28 VITALS
OXYGEN SATURATION: 96 % | HEART RATE: 79 BPM | HEIGHT: 73 IN | TEMPERATURE: 98 F | WEIGHT: 175 LBS | BODY MASS INDEX: 23.19 KG/M2 | DIASTOLIC BLOOD PRESSURE: 70 MMHG | SYSTOLIC BLOOD PRESSURE: 114 MMHG | RESPIRATION RATE: 18 BRPM

## 2023-06-28 DIAGNOSIS — E11.8 TYPE 2 DIABETES MELLITUS WITH COMPLICATION, WITHOUT LONG-TERM CURRENT USE OF INSULIN (HCC): Primary | ICD-10-CM

## 2023-06-28 DIAGNOSIS — L65.0 TELOGEN EFFLUVIUM: ICD-10-CM

## 2023-06-28 LAB — SL AMB POCT HEMOGLOBIN AIC: 8.5 (ref ?–6.5)

## 2023-06-28 PROCEDURE — 99214 OFFICE O/P EST MOD 30 MIN: CPT | Performed by: FAMILY MEDICINE

## 2023-06-28 PROCEDURE — 83036 HEMOGLOBIN GLYCOSYLATED A1C: CPT | Performed by: FAMILY MEDICINE

## 2023-06-28 RX ORDER — ROSUVASTATIN CALCIUM 10 MG/1
10 TABLET, COATED ORAL DAILY
Qty: 30 TABLET | Refills: 5 | Status: SHIPPED | OUTPATIENT
Start: 2023-06-28 | End: 2023-07-03

## 2023-06-28 NOTE — ASSESSMENT & PLAN NOTE
Lab Results   Component Value Date    HGBA1C 8 5 (A) 06/28/2023   -Not at goal  -Hemoglobin A1c has increased from last visit  -Dietary counseling provided  -Continue metformin and januvia  -We will start Jardiance  -Will start statin for cardiovascular risk reduction  -Recommend yearly ophthalmology and podiatry evaluation  -Referral to diabetic education

## 2023-06-28 NOTE — ASSESSMENT & PLAN NOTE
We will check basic labs including thyroid function, iron panel, vitamin D, and vitamin B12  Trial of topical minoxidil

## 2023-06-28 NOTE — PROGRESS NOTES
Name: Mortimer Heck  : 1975      MRN: [id number]  Encounter Provider: Billie Lara MD  Encounter Date: 2023   Encounter department: [address] [place] [address]     1  Type 2 diabetes mellitus with complication, without long-term current use of insulin ([facility])  Assessment & Plan:    Lab Results   Component Value Date    HGBA1C 8 5 (A) 2023   -Not at goal  -Hemoglobin A1c has increased from last visit  -Dietary counseling provided  -Continue metformin and januvia  -We will start Jardiance  -Will start statin for cardiovascular risk reduction  -Recommend yearly ophthalmology and podiatry evaluation  -Referral to diabetic education    Orders:  -     rosuvastatin (CRESTOR) 10 MG tablet; Take 1 tablet (10 mg total) by mouth daily  -     Ambulatory referral to Diabetic Education; Future  -     Empagliflozin (Jardiance) 10 MG TABS tablet; Take 1 tablet (10 mg total) by mouth in the morning  -     POCT hemoglobin A1c    2  Telogen effluvium  Assessment & Plan: We will check basic labs including thyroid function, iron panel, vitamin D, and vitamin B12  Trial of topical minoxidil    Orders:  -     Comprehensive metabolic panel; Future  -     Iron Panel (Includes Ferritin, Iron Sat%, Iron, and TIBC); Future  -     Vitamin D 25 hydroxy; Future  -     Vitamin B12; Future  -     CBC and differential; Future  -     minoxidil (ROGAINE) 2 % external solution; Apply topically 2 (two) times a day  -     TSH + Free T4; Future         Subjective      72-year-old male with a past medical history of rheumatoid arthritis, type 2 diabetes, and hyperlipidemia who presents today for follow-up  He is concerned about hair loss for the past few months  He reports that every time he is showering he notices significant hair loss with gentle manipulation of his scalp  He denies using any new cream or hair products  He stress level is the same    He has a history of rheumatoid arthritis  He reports that he is stable on his current medication regimen  He admits that he has not been very consistent with a healthy low glycemic diet  Review of Systems   Constitutional: Negative for chills, fatigue and fever  HENT: Negative for congestion  Eyes: Negative for visual disturbance  Respiratory: Negative for cough, shortness of breath and wheezing  Cardiovascular: Negative for chest pain, palpitations and leg swelling  Gastrointestinal: Negative for abdominal pain, constipation, diarrhea, nausea and vomiting  Endocrine: Negative for polydipsia, polyphagia and polyuria  Musculoskeletal: Negative for back pain  Skin: Negative for rash  Neurological: Negative for dizziness, tremors, light-headedness, numbness and headaches  Psychiatric/Behavioral: Negative for confusion         Current Outpatient Medications on File Prior to Visit   Medication Sig   • Accu-Chek FastClix Lancets MISC Use 2 (two) times a day   • albuterol (Ventolin HFA) 90 mcg/act inhaler Inhale 2 puffs every 6 (six) hours as needed for wheezing   • Blood Glucose Monitoring Suppl (Accu-Chek Guide) w/Device KIT Use 2 (two) times a day   • fluticasone (FLONASE) 50 mcg/act nasal spray 1 spray into each nostril daily   • glucose blood (Accu-Chek Guide) test strip Use as instructed   • Januvia 100 MG tablet take 1 tablet by mouth once daily   • leflunomide (ARAVA) 20 MG tablet Take 20 mg by mouth daily   • loratadine (CLARITIN) 10 mg tablet Take 1 tablet (10 mg total) by mouth daily   • meloxicam (MOBIC) 15 mg tablet Take 1 tablet (15 mg total) by mouth daily   • metFORMIN (GLUCOPHAGE) 1000 MG tablet take 1 tablet by mouth twice a day with meals   • propranolol (INDERAL) 10 mg tablet    • QUEtiapine (SEROQUEL) 200 mg tablet daily as needed    • QUEtiapine (SEROquel) 300 mg tablet Take by mouth daily as needed   • Xeljanz XR 11 MG TB24 Take 1 tablet by mouth daily       Objective     /70 (BP "Location: Right arm, Patient Position: Sitting, Cuff Size: Standard)   Pulse 79   Temp 98 °F (36 7 °C) (Temporal)   Resp 18   Ht 6' 1\" (1 854 m)   Wt 79 4 kg (175 lb)   SpO2 96%   BMI 23 09 kg/m²     Physical Exam  Constitutional:       General: He is not in acute distress  Appearance: Normal appearance  He is well-developed  He is not ill-appearing, toxic-appearing or diaphoretic  HENT:      Head: Normocephalic and atraumatic  Right Ear: External ear normal       Left Ear: External ear normal       Nose: Nose normal       Mouth/Throat:      Pharynx: No oropharyngeal exudate or posterior oropharyngeal erythema  Eyes:      General: No scleral icterus  Right eye: No discharge  Left eye: No discharge  Extraocular Movements: Extraocular movements intact  Conjunctiva/sclera: Conjunctivae normal       Pupils: Pupils are equal, round, and reactive to light  Cardiovascular:      Rate and Rhythm: Normal rate and regular rhythm  Heart sounds: Normal heart sounds  No murmur heard  No friction rub  No gallop  Pulmonary:      Effort: Pulmonary effort is normal  No respiratory distress  Breath sounds: Normal breath sounds  No stridor  No wheezing or rhonchi  Abdominal:      General: Bowel sounds are normal  There is no distension  Palpations: Abdomen is soft  Tenderness: There is no abdominal tenderness  There is no guarding  Musculoskeletal:         General: Normal range of motion  Cervical back: Normal range of motion and neck supple  Right lower leg: No edema  Left lower leg: No edema  Skin:     General: Skin is warm  Capillary Refill: Capillary refill takes less than 2 seconds  Findings: No lesion or rash  Neurological:      General: No focal deficit present  Mental Status: He is alert and oriented to person, place, and time  Motor: No weakness        Gait: Gait normal    Psychiatric:         Mood and Affect: " Mood normal          Behavior: Behavior normal        Ted Silveira MD

## 2023-06-29 ENCOUNTER — TELEPHONE (OUTPATIENT)
Dept: INTERNAL MEDICINE CLINIC | Facility: OTHER | Age: 48
End: 2023-06-29

## 2023-06-29 DIAGNOSIS — E11.8 TYPE 2 DIABETES MELLITUS WITH COMPLICATION, WITHOUT LONG-TERM CURRENT USE OF INSULIN (HCC): ICD-10-CM

## 2023-07-03 RX ORDER — ROSUVASTATIN CALCIUM 10 MG/1
TABLET, COATED ORAL
Qty: 90 TABLET | Refills: 5 | Status: SHIPPED | OUTPATIENT
Start: 2023-07-03

## 2023-08-21 PROBLEM — Z00.00 HEALTHCARE MAINTENANCE: Status: RESOLVED | Noted: 2022-10-03 | Resolved: 2023-08-21

## 2023-08-22 ENCOUNTER — CLINICAL SUPPORT (OUTPATIENT)
Dept: FAMILY MEDICINE CLINIC | Facility: CLINIC | Age: 48
End: 2023-08-22

## 2023-08-22 ENCOUNTER — APPOINTMENT (OUTPATIENT)
Dept: LAB | Facility: HOSPITAL | Age: 48
End: 2023-08-22
Payer: MEDICARE

## 2023-08-22 DIAGNOSIS — Z79.899 ENCOUNTER FOR LONG-TERM (CURRENT) USE OF OTHER MEDICATIONS: Primary | ICD-10-CM

## 2023-08-22 DIAGNOSIS — Z01.00 DIABETIC EYE EXAM (HCC): Primary | ICD-10-CM

## 2023-08-22 DIAGNOSIS — E11.8 TYPE 2 DIABETES MELLITUS WITH COMPLICATION, WITHOUT LONG-TERM CURRENT USE OF INSULIN (HCC): ICD-10-CM

## 2023-08-22 DIAGNOSIS — Z51.81 ENCOUNTER FOR THERAPEUTIC DRUG MONITORING: ICD-10-CM

## 2023-08-22 DIAGNOSIS — E11.9 DIABETIC EYE EXAM (HCC): Primary | ICD-10-CM

## 2023-08-22 DIAGNOSIS — L65.0 TELOGEN EFFLUVIUM: ICD-10-CM

## 2023-08-22 DIAGNOSIS — R05.2 SUBACUTE COUGH: ICD-10-CM

## 2023-08-22 LAB
25(OH)D3 SERPL-MCNC: 11.9 NG/ML (ref 30–100)
ALBUMIN SERPL BCP-MCNC: 4.1 G/DL (ref 3.5–5)
ALP SERPL-CCNC: 104 U/L (ref 34–104)
ALT SERPL W P-5'-P-CCNC: 15 U/L (ref 7–52)
ANION GAP SERPL CALCULATED.3IONS-SCNC: 11 MMOL/L
AST SERPL W P-5'-P-CCNC: 11 U/L (ref 13–39)
BASOPHILS # BLD AUTO: 0.02 THOUSANDS/ÂΜL (ref 0–0.1)
BASOPHILS NFR BLD AUTO: 0 % (ref 0–1)
BILIRUB SERPL-MCNC: 0.37 MG/DL (ref 0.2–1)
BUN SERPL-MCNC: 13 MG/DL (ref 5–25)
CALCIUM SERPL-MCNC: 9.4 MG/DL (ref 8.4–10.2)
CHLORIDE SERPL-SCNC: 104 MMOL/L (ref 96–108)
CO2 SERPL-SCNC: 23 MMOL/L (ref 21–32)
CREAT SERPL-MCNC: 0.65 MG/DL (ref 0.6–1.3)
EOSINOPHIL # BLD AUTO: 0.12 THOUSAND/ÂΜL (ref 0–0.61)
EOSINOPHIL NFR BLD AUTO: 2 % (ref 0–6)
ERYTHROCYTE [DISTWIDTH] IN BLOOD BY AUTOMATED COUNT: 12.1 % (ref 11.6–15.1)
FERRITIN SERPL-MCNC: 39 NG/ML (ref 24–336)
GFR SERPL CREATININE-BSD FRML MDRD: 117 ML/MIN/1.73SQ M
GLUCOSE P FAST SERPL-MCNC: 227 MG/DL (ref 65–99)
HCT VFR BLD AUTO: 39.7 % (ref 36.5–49.3)
HGB BLD-MCNC: 12.9 G/DL (ref 12–17)
IMM GRANULOCYTES # BLD AUTO: 0.01 THOUSAND/UL (ref 0–0.2)
IMM GRANULOCYTES NFR BLD AUTO: 0 % (ref 0–2)
IRON SATN MFR SERPL: 18 % (ref 15–50)
IRON SERPL-MCNC: 59 UG/DL (ref 50–212)
LEFT EYE DIABETIC RETINOPATHY: ABNORMAL
LEFT EYE IMAGE QUALITY: ABNORMAL
LEFT EYE MACULAR EDEMA: ABNORMAL
LEFT EYE OTHER RETINOPATHY: ABNORMAL
LYMPHOCYTES # BLD AUTO: 0.88 THOUSANDS/ÂΜL (ref 0.6–4.47)
LYMPHOCYTES NFR BLD AUTO: 16 % (ref 14–44)
MCH RBC QN AUTO: 30.9 PG (ref 26.8–34.3)
MCHC RBC AUTO-ENTMCNC: 32.5 G/DL (ref 31.4–37.4)
MCV RBC AUTO: 95 FL (ref 82–98)
MONOCYTES # BLD AUTO: 0.36 THOUSAND/ÂΜL (ref 0.17–1.22)
MONOCYTES NFR BLD AUTO: 7 % (ref 4–12)
NEUTROPHILS # BLD AUTO: 4.16 THOUSANDS/ÂΜL (ref 1.85–7.62)
NEUTS SEG NFR BLD AUTO: 75 % (ref 43–75)
NRBC BLD AUTO-RTO: 0 /100 WBCS
PLATELET # BLD AUTO: 246 THOUSANDS/UL (ref 149–390)
PMV BLD AUTO: 10.6 FL (ref 8.9–12.7)
POTASSIUM SERPL-SCNC: 4 MMOL/L (ref 3.5–5.3)
PROT SERPL-MCNC: 7.1 G/DL (ref 6.4–8.4)
RBC # BLD AUTO: 4.17 MILLION/UL (ref 3.88–5.62)
RIGHT EYE DIABETIC RETINOPATHY: ABNORMAL
RIGHT EYE IMAGE QUALITY: ABNORMAL
RIGHT EYE MACULAR EDEMA: ABNORMAL
RIGHT EYE OTHER RETINOPATHY: ABNORMAL
SEVERITY (EYE EXAM): ABNORMAL
SODIUM SERPL-SCNC: 138 MMOL/L (ref 135–147)
T4 FREE SERPL-MCNC: 0.42 NG/DL (ref 0.61–1.12)
TIBC SERPL-MCNC: 324 UG/DL (ref 250–450)
TSH SERPL DL<=0.05 MIU/L-ACNC: 2.97 UIU/ML (ref 0.45–4.5)
UIBC SERPL-MCNC: 265 UG/DL (ref 155–355)
VIT B12 SERPL-MCNC: 342 PG/ML (ref 180–914)
WBC # BLD AUTO: 5.55 THOUSAND/UL (ref 4.31–10.16)

## 2023-08-22 PROCEDURE — 82306 VITAMIN D 25 HYDROXY: CPT

## 2023-08-22 PROCEDURE — 84439 ASSAY OF FREE THYROXINE: CPT

## 2023-08-22 PROCEDURE — 80053 COMPREHEN METABOLIC PANEL: CPT

## 2023-08-22 PROCEDURE — 84443 ASSAY THYROID STIM HORMONE: CPT

## 2023-08-22 PROCEDURE — 85025 COMPLETE CBC W/AUTO DIFF WBC: CPT

## 2023-08-22 PROCEDURE — 86480 TB TEST CELL IMMUN MEASURE: CPT

## 2023-08-22 PROCEDURE — 82728 ASSAY OF FERRITIN: CPT

## 2023-08-22 PROCEDURE — 92250 FUNDUS PHOTOGRAPHY W/I&R: CPT | Performed by: FAMILY MEDICINE

## 2023-08-22 PROCEDURE — 82607 VITAMIN B-12: CPT

## 2023-08-22 PROCEDURE — 83550 IRON BINDING TEST: CPT

## 2023-08-22 PROCEDURE — 83540 ASSAY OF IRON: CPT

## 2023-08-22 PROCEDURE — 36415 COLL VENOUS BLD VENIPUNCTURE: CPT

## 2023-08-23 ENCOUNTER — TELEPHONE (OUTPATIENT)
Dept: FAMILY MEDICINE CLINIC | Facility: CLINIC | Age: 48
End: 2023-08-23

## 2023-08-23 ENCOUNTER — APPOINTMENT (OUTPATIENT)
Dept: LAB | Facility: HOSPITAL | Age: 48
End: 2023-08-23
Payer: MEDICARE

## 2023-08-23 DIAGNOSIS — Z79.622 LONG-TERM CURRENT USE OF TOFACITINIB: ICD-10-CM

## 2023-08-23 LAB
ALBUMIN SERPL BCP-MCNC: 4.2 G/DL (ref 3.5–5)
ALP SERPL-CCNC: 109 U/L (ref 34–104)
ALT SERPL W P-5'-P-CCNC: 14 U/L (ref 7–52)
ANION GAP SERPL CALCULATED.3IONS-SCNC: 8 MMOL/L
AST SERPL W P-5'-P-CCNC: 11 U/L (ref 13–39)
BASOPHILS # BLD AUTO: 0.03 THOUSANDS/ÂΜL (ref 0–0.1)
BASOPHILS NFR BLD AUTO: 1 % (ref 0–1)
BILIRUB SERPL-MCNC: 0.33 MG/DL (ref 0.2–1)
BUN SERPL-MCNC: 11 MG/DL (ref 5–25)
CALCIUM SERPL-MCNC: 9.7 MG/DL (ref 8.4–10.2)
CHLORIDE SERPL-SCNC: 101 MMOL/L (ref 96–108)
CHOLEST SERPL-MCNC: 98 MG/DL
CO2 SERPL-SCNC: 29 MMOL/L (ref 21–32)
CREAT SERPL-MCNC: 0.67 MG/DL (ref 0.6–1.3)
EOSINOPHIL # BLD AUTO: 0.15 THOUSAND/ÂΜL (ref 0–0.61)
EOSINOPHIL NFR BLD AUTO: 3 % (ref 0–6)
ERYTHROCYTE [DISTWIDTH] IN BLOOD BY AUTOMATED COUNT: 11.9 % (ref 11.6–15.1)
GFR SERPL CREATININE-BSD FRML MDRD: 116 ML/MIN/1.73SQ M
GLUCOSE SERPL-MCNC: 158 MG/DL (ref 65–140)
HCT VFR BLD AUTO: 37.6 % (ref 36.5–49.3)
HDLC SERPL-MCNC: 34 MG/DL
HGB BLD-MCNC: 12.5 G/DL (ref 12–17)
IMM GRANULOCYTES # BLD AUTO: 0.02 THOUSAND/UL (ref 0–0.2)
IMM GRANULOCYTES NFR BLD AUTO: 0 % (ref 0–2)
LDLC SERPL CALC-MCNC: 34 MG/DL (ref 0–100)
LYMPHOCYTES # BLD AUTO: 1.31 THOUSANDS/ÂΜL (ref 0.6–4.47)
LYMPHOCYTES NFR BLD AUTO: 24 % (ref 14–44)
MCH RBC QN AUTO: 31.6 PG (ref 26.8–34.3)
MCHC RBC AUTO-ENTMCNC: 33.2 G/DL (ref 31.4–37.4)
MCV RBC AUTO: 95 FL (ref 82–98)
MONOCYTES # BLD AUTO: 0.55 THOUSAND/ÂΜL (ref 0.17–1.22)
MONOCYTES NFR BLD AUTO: 10 % (ref 4–12)
NEUTROPHILS # BLD AUTO: 3.37 THOUSANDS/ÂΜL (ref 1.85–7.62)
NEUTS SEG NFR BLD AUTO: 62 % (ref 43–75)
NONHDLC SERPL-MCNC: 64 MG/DL
NRBC BLD AUTO-RTO: 0 /100 WBCS
PLATELET # BLD AUTO: 269 THOUSANDS/UL (ref 149–390)
PMV BLD AUTO: 10.4 FL (ref 8.9–12.7)
POTASSIUM SERPL-SCNC: 3.9 MMOL/L (ref 3.5–5.3)
PROT SERPL-MCNC: 7.2 G/DL (ref 6.4–8.4)
RBC # BLD AUTO: 3.95 MILLION/UL (ref 3.88–5.62)
SODIUM SERPL-SCNC: 138 MMOL/L (ref 135–147)
TRIGL SERPL-MCNC: 151 MG/DL
WBC # BLD AUTO: 5.43 THOUSAND/UL (ref 4.31–10.16)

## 2023-08-23 PROCEDURE — 80061 LIPID PANEL: CPT

## 2023-08-23 PROCEDURE — 85025 COMPLETE CBC W/AUTO DIFF WBC: CPT

## 2023-08-23 PROCEDURE — 36415 COLL VENOUS BLD VENIPUNCTURE: CPT

## 2023-08-23 PROCEDURE — 80053 COMPREHEN METABOLIC PANEL: CPT

## 2023-08-23 NOTE — TELEPHONE ENCOUNTER
08/23/23     [facility] / Donor Form  received on 8/23/23  to be completed by PCP. Copy made and placed in PCP folder. Forms to be delivered to PCP mailbox at assigned time.

## 2023-08-24 LAB
GAMMA INTERFERON BACKGROUND BLD IA-ACNC: 0.03 IU/ML
M TB IFN-G BLD-IMP: NEGATIVE
M TB IFN-G CD4+ BCKGRND COR BLD-ACNC: -0.01 IU/ML
M TB IFN-G CD4+ BCKGRND COR BLD-ACNC: 0 IU/ML
MITOGEN IGNF BCKGRD COR BLD-ACNC: 3.59 IU/ML

## 2023-08-30 DIAGNOSIS — E55.9 VITAMIN D DEFICIENCY: Primary | ICD-10-CM

## 2023-08-30 RX ORDER — ERGOCALCIFEROL 1.25 MG/1
CAPSULE ORAL
Qty: 8 CAPSULE | Refills: 0 | Status: SHIPPED | OUTPATIENT
Start: 2023-08-30

## 2023-09-06 ENCOUNTER — TELEPHONE (OUTPATIENT)
Dept: FAMILY MEDICINE CLINIC | Facility: CLINIC | Age: 48
End: 2023-09-06

## 2023-09-06 NOTE — TELEPHONE ENCOUNTER
first attempt to contact patient.  left message to return my call on answering machine      LETTER WILL BE MAILED

## 2023-09-06 NOTE — TELEPHONE ENCOUNTER
----- Message from Tam Harper MD sent at 8/30/2023  2:58 PM EDT -----  Please call the patient regarding his abnormal result. His vitamin D level is low. I have sent vitamin D supplement to his pharmacy.

## 2023-09-28 NOTE — TELEPHONE ENCOUNTER
08/31/21 9:09 AM     See documentation in the [employer] Smart[employer]       [place] Left message to call back.

## 2023-10-05 ENCOUNTER — OFFICE VISIT (OUTPATIENT)
Dept: FAMILY MEDICINE CLINIC | Facility: CLINIC | Age: 48
End: 2023-10-05

## 2023-10-05 VITALS
RESPIRATION RATE: 16 BRPM | TEMPERATURE: 98.7 F | BODY MASS INDEX: 22.53 KG/M2 | OXYGEN SATURATION: 96 % | HEIGHT: 73 IN | DIASTOLIC BLOOD PRESSURE: 70 MMHG | SYSTOLIC BLOOD PRESSURE: 110 MMHG | WEIGHT: 170 LBS | HEART RATE: 99 BPM

## 2023-10-05 DIAGNOSIS — E11.9 TYPE 2 DIABETES MELLITUS WITHOUT COMPLICATION, WITHOUT LONG-TERM CURRENT USE OF INSULIN (HCC): Primary | ICD-10-CM

## 2023-10-05 DIAGNOSIS — M06.9 RHEUMATOID ARTHRITIS, INVOLVING UNSPECIFIED SITE, UNSPECIFIED WHETHER RHEUMATOID FACTOR PRESENT (HCC): ICD-10-CM

## 2023-10-05 DIAGNOSIS — Z12.11 ENCOUNTER FOR SCREENING COLONOSCOPY: ICD-10-CM

## 2023-10-05 DIAGNOSIS — Z23 ENCOUNTER FOR IMMUNIZATION: ICD-10-CM

## 2023-10-05 LAB — SL AMB POCT HEMOGLOBIN AIC: 7.1 (ref ?–6.5)

## 2023-10-05 PROCEDURE — 90686 IIV4 VACC NO PRSV 0.5 ML IM: CPT | Performed by: FAMILY MEDICINE

## 2023-10-05 PROCEDURE — G0008 ADMIN INFLUENZA VIRUS VAC: HCPCS | Performed by: FAMILY MEDICINE

## 2023-10-05 PROCEDURE — 83036 HEMOGLOBIN GLYCOSYLATED A1C: CPT | Performed by: FAMILY MEDICINE

## 2023-10-05 PROCEDURE — 99214 OFFICE O/P EST MOD 30 MIN: CPT | Performed by: FAMILY MEDICINE

## 2023-10-05 NOTE — ASSESSMENT & PLAN NOTE
Lab Results   Component Value Date    HGBA1C 7.1 (A) 10/05/2023   -Much improved  -Just started Jardiance 1 week ago  -Continue Januvia and metformin 1000 mg BID  -Dietary and exercise counseling provided to patient  -Continue statin

## 2023-10-05 NOTE — PROGRESS NOTES
Name: Vj Ugalde. : 1975      MRN: [id number]  Encounter Provider: Fernanda Sood MD  Encounter Date: 10/5/2023   Encounter department: [address] [facility],[address]     1. Type 2 diabetes mellitus without complication, without long-term current use of insulin (HCC)  Assessment & Plan:    Lab Results   Component Value Date    HGBA1C 7.1 (A) 10/05/2023   -Much improved  -Just started Jardiance 1 week ago  -Continue Januvia and metformin 1000 mg BID  -Dietary and exercise counseling provided to patient  -Continue statin        Orders:  -     POCT hemoglobin A1c  -     sitaGLIPtin (Januvia) 100 mg tablet; Take 1 tablet (100 mg total) by mouth daily  -     Albumin / creatinine urine ratio; Future    2. Rheumatoid arthritis, involving unspecified site, unspecified whether rheumatoid factor present [facility])  Assessment & Plan:  -Well controlled  -On Rosa Elena Ledesma with rheumatology outpatient      3. Encounter for immunization  -     influenza vaccine, quadrivalent, 0.5 mL, preservative-free, for adult and pediatric patients 6 mos+ ([facility], [facility], [facility], [address] [place] [address])    4. Encounter for screening colonoscopy  -     Ambulatory Referral to Gastroenterology; Future      Depression Screening and Follow-up Plan: Patient was screened for depression during today's encounter. They screened negative with a PHQ-2 score of 0. Subjective      40 y/o male with a history of rheumatoid arthritis and type II DM. He has no concerns today. He is at his baseline state of health. He has no concerns today. He wants his blood donation clearance form to be filled out. Review of Systems   Constitutional: Negative for chills, diaphoresis, fatigue and fever. HENT: Negative for congestion. Eyes: Negative for visual disturbance. Respiratory: Negative for cough, shortness of breath and wheezing.     Cardiovascular: Negative for chest pain, palpitations and leg swelling. Gastrointestinal: Negative for abdominal pain, anal bleeding, blood in stool, constipation, diarrhea, nausea and vomiting. Endocrine: Negative for polydipsia, polyphagia and polyuria. Musculoskeletal: Negative for back pain. Skin: Negative for rash. Neurological: Negative for dizziness, tremors, syncope, light-headedness, numbness and headaches.        Current Outpatient Medications on File Prior to Visit   Medication Sig   • Empagliflozin (Jardiance) 10 MG TABS tablet Take 1 tablet (10 mg total) by mouth in the morning   • rosuvastatin (CRESTOR) 10 MG tablet take 1 tablet by mouth once daily   • Accu-Chek FastClix Lancets MISC Use 2 (two) times a day   • albuterol (Ventolin HFA) 90 mcg/act inhaler Inhale 2 puffs every 6 (six) hours as needed for wheezing   • Blood Glucose Monitoring Suppl (Accu-Chek Guide) w/Device KIT Use 2 (two) times a day   • ergocalciferol (VITAMIN D2) 50,000 units Take 1 tablet orally once per week for 8 weeks   • fluticasone (FLONASE) 50 mcg/act nasal spray 1 spray into each nostril daily   • glucose blood (Accu-Chek Guide) test strip Use as instructed   • leflunomide (ARAVA) 20 MG tablet Take 20 mg by mouth daily   • loratadine (CLARITIN) 10 mg tablet Take 1 tablet (10 mg total) by mouth daily   • meloxicam (MOBIC) 15 mg tablet Take 1 tablet (15 mg total) by mouth daily   • metFORMIN (GLUCOPHAGE) 1000 MG tablet take 1 tablet by mouth twice a day with meals   • minoxidil (ROGAINE) 2 % external solution Apply topically 2 (two) times a day   • propranolol (INDERAL) 10 mg tablet    • QUEtiapine (SEROQUEL) 200 mg tablet daily as needed    • QUEtiapine (SEROquel) 300 mg tablet Take by mouth daily as needed   • Xeljanz XR 11 MG TB24 Take 1 tablet by mouth daily   • [DISCONTINUED] Januvia 100 MG tablet take 1 tablet by mouth once daily       Objective     /70 (BP Location: Right arm, Patient Position: Sitting, Cuff Size: Standard)   Pulse 99   Temp 98.7 °F (37.1 °C) (Temporal)   Resp 16   Ht 6' 1" (1.854 m)   Wt 77.1 kg (170 lb)   SpO2 96%   BMI 22.43 kg/m²     Physical Exam  Constitutional:       General: He is not in acute distress. Appearance: Normal appearance. He is well-developed. He is not ill-appearing, toxic-appearing or diaphoretic. HENT:      Head: Normocephalic and atraumatic. Right Ear: External ear normal.      Left Ear: External ear normal.      Nose: Nose normal.      Mouth/Throat:      Pharynx: No oropharyngeal exudate or posterior oropharyngeal erythema. Eyes:      General: No scleral icterus. Right eye: No discharge. Left eye: No discharge. Extraocular Movements: Extraocular movements intact. Conjunctiva/sclera: Conjunctivae normal.      Pupils: Pupils are equal, round, and reactive to light. Cardiovascular:      Rate and Rhythm: Normal rate and regular rhythm. Pulses: no weak pulses          Dorsalis pedis pulses are 2+ on the right side and 2+ on the left side. Posterior tibial pulses are 2+ on the right side and 2+ on the left side. Heart sounds: Normal heart sounds. No murmur heard. No friction rub. No gallop. Pulmonary:      Effort: Pulmonary effort is normal. No respiratory distress. Breath sounds: Normal breath sounds. No stridor. No wheezing or rhonchi. Abdominal:      General: Bowel sounds are normal. There is no distension. Palpations: Abdomen is soft. Tenderness: There is no abdominal tenderness. There is no guarding. Musculoskeletal:         General: Normal range of motion. Cervical back: Normal range of motion and neck supple. Right lower leg: No edema. Left lower leg: No edema. Feet:      Right foot:      Skin integrity: No ulcer, skin breakdown, erythema, warmth, callus or dry skin. Left foot:      Skin integrity: No ulcer, skin breakdown, erythema, warmth, callus or dry skin. Skin:     General: Skin is warm.       Findings: No lesion or rash. Neurological:      General: No focal deficit present. Mental Status: He is alert and oriented to person, place, and time. Cranial Nerves: No cranial nerve deficit. Motor: No weakness. Gait: Gait normal.   Psychiatric:         Mood and Affect: Mood normal.         Behavior: Behavior normal.       Diabetic Foot Exam    Patient's shoes and socks removed. Right Foot/Ankle   Right Foot Inspection  Skin Exam: skin normal and skin intact. No dry skin, no warmth, no callus, no erythema, no maceration, no abnormal color, no pre-ulcer, no ulcer and no callus. Toe Exam: ROM and strength within normal limits. No swelling, no tenderness, erythema and  no right toe deformity    Sensory   Proprioception: intact  Monofilament testing: intact    Vascular  Capillary refills: < 3 seconds  The right DP pulse is 2+. The right PT pulse is 2+. Left Foot/Ankle  Left Foot Inspection  Skin Exam: skin normal and skin intact. No dry skin, no warmth, no erythema, no maceration, normal color, no pre-ulcer, no ulcer and no callus. Toe Exam: ROM and strength within normal limits. No swelling, no tenderness, no erythema and no left toe deformity. Sensory   Proprioception: intact  Monofilament testing: intact    Vascular  Capillary refills: < 3 seconds  The left DP pulse is 2+. The left PT pulse is 2+.      Assign Risk Category  No deformity present  No loss of protective sensation  No weak pulses  Risk: 0    Janelle Jean MD

## 2023-10-25 ENCOUNTER — TELEPHONE (OUTPATIENT)
Age: 48
End: 2023-10-25

## 2023-10-25 ENCOUNTER — PREP FOR PROCEDURE (OUTPATIENT)
Age: 48
End: 2023-10-25

## 2023-10-25 DIAGNOSIS — Z12.11 SCREENING FOR COLON CANCER: Primary | ICD-10-CM

## 2023-10-25 DIAGNOSIS — E55.9 VITAMIN D DEFICIENCY: ICD-10-CM

## 2023-10-25 NOTE — TELEPHONE ENCOUNTER
Scheduled date of colonoscopy (as of today): 12/5/23  Physician performing colonoscopy: Amy   Location of colonoscopy: [address]  Bowel prep reviewed with patient: Honorio/Tami  Instructions reviewed with patient by: emailed to [email or web address]  Clearances: diabetic

## 2023-10-25 NOTE — TELEPHONE ENCOUNTER
10/25/23  Screened by: Cecily Jaeger    Referring Provider [facility]     Pre- Screening: Body mass index is 22.43 kg/m². Has patient been referred for a routine screening Colonoscopy? yes  Is the patient between 43-73 years old? yes      Previous Colonoscopy no   If yes:    Date:     Facility:     Reason:       SCHEDULING STAFF: If the patient is between 45yrs-49yrs, please advise patient to confirm benefits/coverage with their insurance company for a routine screening colonoscopy, some insurance carriers will only cover at [address] [place] [address] or older. If the patient is over 66years old, please schedule an office visit. Does the patient want to see a Gastroenterologist prior to their procedure OR are they having any GI symptoms? no    Has the patient been hospitalized or had abdominal surgery in the past 6 months? no    Does the patient use supplemental oxygen? no    Does the patient take Coumadin, Lovenox, Plavix, Elliquis, Xarelto, or other blood thinning medication? no    Has the patient had a stroke, cardiac event, or stent placed in the past year? no    SCHEDULING STAFF: If patient answers NO to above questions, then schedule procedure. If patient answers YES to above questions, then schedule office appointment. If patient is between 45yrs - 49yrs, please advise patient that we will have to confirm benefits & coverage with their insurance company for a routine screening colonoscopy.

## 2023-10-27 RX ORDER — ERGOCALCIFEROL 1.25 MG/1
CAPSULE ORAL
Qty: 8 CAPSULE | Refills: 0 | Status: SHIPPED | OUTPATIENT
Start: 2023-10-27

## 2023-11-22 ENCOUNTER — ANESTHESIA (OUTPATIENT)
Dept: ANESTHESIOLOGY | Facility: HOSPITAL | Age: 48
End: 2023-11-22

## 2023-11-22 ENCOUNTER — ANESTHESIA EVENT (OUTPATIENT)
Dept: ANESTHESIOLOGY | Facility: HOSPITAL | Age: 48
End: 2023-11-22

## 2023-11-24 ENCOUNTER — HOSPITAL ENCOUNTER (EMERGENCY)
Facility: HOSPITAL | Age: 48
Discharge: HOME | End: 2023-11-24
Attending: EMERGENCY MEDICINE
Payer: OTHER MISCELLANEOUS

## 2023-11-24 VITALS
BODY MASS INDEX: 23.04 KG/M2 | RESPIRATION RATE: 16 BRPM | SYSTOLIC BLOOD PRESSURE: 190 MMHG | OXYGEN SATURATION: 95 % | HEART RATE: 97 BPM | TEMPERATURE: 97.6 F | WEIGHT: 174.6 LBS | DIASTOLIC BLOOD PRESSURE: 113 MMHG

## 2023-11-24 DIAGNOSIS — S61.211A LACERATION OF LEFT INDEX FINGER WITHOUT FOREIGN BODY WITHOUT DAMAGE TO NAIL, INITIAL ENCOUNTER: Primary | ICD-10-CM

## 2023-11-24 DIAGNOSIS — J06.9 UPPER RESPIRATORY TRACT INFECTION, UNSPECIFIED TYPE: ICD-10-CM

## 2023-11-24 PROCEDURE — 99283 EMERGENCY DEPT VISIT LOW MDM: CPT | Performed by: EMERGENCY MEDICINE

## 2023-11-24 PROCEDURE — 99283 EMERGENCY DEPT VISIT LOW MDM: CPT

## 2023-11-24 NOTE — ED PROVIDER NOTES
History  Chief Complaint   Patient presents with    Hand Laceration     "I'm here to get my finger checked, I cut it at work 2 days ago and I'm feeling not got with chills, runny nose and I vomited this morning "     68-year-old male, presents with laceration to left index finger. States he accidentally cut it at work 2 days ago. Has mild pain to area, denies any bleeding or drainage. Patient also states since yesterday he has had chills and congested, vomited once this morning. History provided by:  Patient   used: No        Prior to Admission Medications   Prescriptions Last Dose Informant Patient Reported? Taking?    Accu-Chek FastClix Lancets MISC   No No   Sig: Use 2 (two) times a day   Blood Glucose Monitoring Suppl (Accu-Chek Guide) w/Device KIT   No No   Sig: Use 2 (two) times a day   Empagliflozin (Jardiance) 10 MG TABS tablet   No No   Sig: Take 1 tablet (10 mg total) by mouth in the morning   QUEtiapine (SEROQUEL) 200 mg tablet  Self Yes No   Sig: daily as needed    QUEtiapine (SEROquel) 300 mg tablet   Yes No   Sig: Take by mouth daily as needed   Xeljanz XR 11 MG TB24   Yes No   Sig: Take 1 tablet by mouth daily   albuterol (Ventolin HFA) 90 mcg/act inhaler   No No   Sig: Inhale 2 puffs every 6 (six) hours as needed for wheezing   ergocalciferol (VITAMIN D2) 50,000 units   No No   Sig: take 1 capsule by mouth every week   fluticasone (FLONASE) 50 mcg/act nasal spray   No No   Si spray into each nostril daily   glucose blood (Accu-Chek Guide) test strip   No No   Sig: Use as instructed   leflunomide (ARAVA) 20 MG tablet   Yes No   Sig: Take 20 mg by mouth daily   loratadine (CLARITIN) 10 mg tablet   No No   Sig: Take 1 tablet (10 mg total) by mouth daily   meloxicam (MOBIC) 15 mg tablet   No No   Sig: Take 1 tablet (15 mg total) by mouth daily   metFORMIN (GLUCOPHAGE) 1000 MG tablet   No No   Sig: take 1 tablet by mouth twice a day with meals   minoxidil (ROGAINE) 2 % external solution   No No   Sig: Apply topically 2 (two) times a day   propranolol (INDERAL) 10 mg tablet   Yes No   rosuvastatin (CRESTOR) 10 MG tablet   No No   Sig: take 1 tablet by mouth once daily   sitaGLIPtin (Januvia) 100 mg tablet   No No   Sig: Take 1 tablet (100 mg total) by mouth daily      Facility-Administered Medications: None       Past Medical History:   Diagnosis Date    Abscess of lower leg 8/23/2020    Management per cellulitis above. Back muscle spasm 10/21/2021    Dental abscess 3/14/2022    Elbow laceration, left, initial encounter 7/7/2021    Foot pain, bilateral 11/21/2018    GERD (gastroesophageal reflux disease) 2/5/2019    Presentation suspicious for GERD Will admister 8 week trial of PPi and workup as needed. Venous stasis dermatitis of left lower extremity 11/6/2019       History reviewed. No pertinent surgical history. History reviewed. No pertinent family history. I have reviewed and agree with the history as documented. E-Cigarette/Vaping     E-Cigarette/Vaping Substances     Social History     Tobacco Use    Smoking status: Former     Types: Cigarettes     Quit date: 8/29/2013     Years since quitting: 10.2    Smokeless tobacco: Never   Substance Use Topics    Alcohol use: Not Currently    Drug use: Not Currently     Types: Marijuana       Review of Systems   Constitutional:  Positive for chills. HENT:  Positive for congestion. Neurological: Negative. Physical Exam  Physical Exam  Vitals and nursing note reviewed. Constitutional:       General: He is not in acute distress. HENT:      Head: Normocephalic and atraumatic. Mouth/Throat:      Mouth: Mucous membranes are moist.      Pharynx: Oropharynx is clear. Eyes:      Extraocular Movements: Extraocular movements intact. Pupils: Pupils are equal, round, and reactive to light. Cardiovascular:      Rate and Rhythm: Normal rate and regular rhythm.    Pulmonary:      Effort: Pulmonary effort is normal.      Breath sounds: Normal breath sounds. Musculoskeletal:         General: Normal range of motion. Skin:     General: Skin is warm and dry. Comments: Healing laceration to left distal index finger, nail intact. No bleeding or drainage, no surrounding erythema. Neurological:      General: No focal deficit present. Mental Status: He is alert and oriented to person, place, and time. Motor: No weakness. Gait: Gait normal.         Vital Signs  ED Triage Vitals [11/24/23 1041]   Temperature Pulse Respirations Blood Pressure SpO2   97.6 °F (36.4 °C) 97 16 (!) 190/113 95 %      Temp Source Heart Rate Source Patient Position - Orthostatic VS BP Location FiO2 (%)   Tympanic Monitor Sitting Left arm --      Pain Score       --           Vitals:    11/24/23 1041   BP: (!) 190/113   Pulse: 97   Patient Position - Orthostatic VS: Sitting         Visual Acuity      ED Medications  Medications - No data to display    Diagnostic Studies  Results Reviewed       None                   No orders to display              Procedures  Procedures         ED Course  ED Course as of 11/24/23 1106   Fri Nov 24, 2023   1053 Previous medical records reviewed, patient received tetanus immunization in 2021. SBIRT 20yo+      Flowsheet Row Most Recent Value   Initial Alcohol Screen: US AUDIT-C     1. How often do you have a drink containing alcohol? 0 Filed at: 11/24/2023 1045   2. How many drinks containing alcohol do you have on a typical day you are drinking? 0 Filed at: 11/24/2023 1045   3a. Male UNDER 65: How often do you have five or more drinks on one occasion? 0 Filed at: 11/24/2023 1045   Audit-C Score 0 Filed at: 11/24/2023 1045   MINA: How many times in the past year have you. .. Used an illegal drug or used a prescription medication for non-medical reasons?  Never Filed at: 11/24/2023 1045                      Medical Decision Making  53-year-old male, presenting with complaints of finger laceration as well as general malaise. Differential diagnosis includes wound infection, viral illness, laceration among other diagnoses. On exam, patient has healing superficial laceration to left index finger, no signs of acute infection. Discussed with patient no closure needed at this time, keep area clean and watch for infections. Patient other symptoms likely related to acute viral illness. Looks well and in no distress, normal respiratory effort with clear lungs. Patient states he is supposed to work today but does not feel well enough to, will give work note, instructed to rest at home, use over-the-counter medications such as acetaminophen as needed. Instructed to follow-up or return for any worsening or new concerning symptoms. Amount and/or Complexity of Data Reviewed  External Data Reviewed: notes. Disposition  Final diagnoses:   Laceration of left index finger without foreign body without damage to nail, initial encounter   Upper respiratory tract infection, unspecified type     Time reflects when diagnosis was documented in both MDM as applicable and the Disposition within this note       Time User Action Codes Description Comment    11/24/2023 10:52 AM Miguel A Perez Add [L17.433O] Laceration of left index finger without foreign body without damage to nail, initial encounter     11/24/2023 10:52 AM Miguel A Perez Add [J06.9] Upper respiratory tract infection, unspecified type           ED Disposition       ED Disposition   Discharge    Condition   Stable    Date/Time   Fri Nov 24, 2023 1051    Comment   Jamie Lopez. discharge to home/self care.                    Follow-up Information       Follow up With Specialties Details Why [address] [facility], [address] [facility]  [address]  [phone]              Patient's Medications   Discharge Prescriptions    No medications on file       No discharge procedures on file.     PDMP Review       None            ED Provider  Electronically Signed by             Brad Preston MD  11/24/23 3080

## 2023-11-24 NOTE — Clinical Note
Leonor Robertson was seen and treated in our emergency department on 11/24/2023. Diagnosis:     Jamie  may return to work on return date. He may return on this date: 11/27/2023         If you have any questions or concerns, please don't hesitate to call.       Brenda Moyer MD    ______________________________           _______________          _______________  Hospital Representative                              Date                                Time

## 2023-11-27 ENCOUNTER — TELEPHONE (OUTPATIENT)
Dept: GASTROENTEROLOGY | Facility: MEDICAL CENTER | Age: 48
End: 2023-11-27

## 2023-12-12 DIAGNOSIS — E11.8 TYPE 2 DIABETES MELLITUS WITH COMPLICATION, WITHOUT LONG-TERM CURRENT USE OF INSULIN (HCC): ICD-10-CM

## 2023-12-26 ENCOUNTER — APPOINTMENT (OUTPATIENT)
Dept: LAB | Facility: HOSPITAL | Age: 48
End: 2023-12-26
Payer: MEDICARE

## 2023-12-26 DIAGNOSIS — Z51.81 ENCOUNTER FOR THERAPEUTIC DRUG MONITORING: ICD-10-CM

## 2023-12-26 DIAGNOSIS — Z79.899 ENCOUNTER FOR LONG-TERM (CURRENT) USE OF OTHER MEDICATIONS: ICD-10-CM

## 2023-12-26 LAB
ALBUMIN SERPL BCP-MCNC: 4.3 G/DL (ref 3.5–5)
ALP SERPL-CCNC: 81 U/L (ref 34–104)
ALT SERPL W P-5'-P-CCNC: 26 U/L (ref 7–52)
ANION GAP SERPL CALCULATED.3IONS-SCNC: 7 MMOL/L
AST SERPL W P-5'-P-CCNC: 13 U/L (ref 13–39)
BASOPHILS # BLD AUTO: 0.03 THOUSANDS/ÂΜL (ref 0–0.1)
BASOPHILS NFR BLD AUTO: 1 % (ref 0–1)
BILIRUB SERPL-MCNC: 0.38 MG/DL (ref 0.2–1)
BUN SERPL-MCNC: 16 MG/DL (ref 5–25)
CALCIUM SERPL-MCNC: 9.5 MG/DL (ref 8.4–10.2)
CHLORIDE SERPL-SCNC: 100 MMOL/L (ref 96–108)
CO2 SERPL-SCNC: 26 MMOL/L (ref 21–32)
CREAT SERPL-MCNC: 0.67 MG/DL (ref 0.6–1.3)
CRP SERPL QL: <1 MG/L
EOSINOPHIL # BLD AUTO: 0.09 THOUSAND/ÂΜL (ref 0–0.61)
EOSINOPHIL NFR BLD AUTO: 2 % (ref 0–6)
ERYTHROCYTE [DISTWIDTH] IN BLOOD BY AUTOMATED COUNT: 12.3 % (ref 11.6–15.1)
ERYTHROCYTE [SEDIMENTATION RATE] IN BLOOD: 22 MM/HOUR (ref 0–14)
GFR SERPL CREATININE-BSD FRML MDRD: 115 ML/MIN/1.73SQ M
GLUCOSE SERPL-MCNC: 258 MG/DL (ref 65–140)
HCT VFR BLD AUTO: 43.9 % (ref 36.5–49.3)
HGB BLD-MCNC: 14.5 G/DL (ref 12–17)
IMM GRANULOCYTES # BLD AUTO: 0.03 THOUSAND/UL (ref 0–0.2)
IMM GRANULOCYTES NFR BLD AUTO: 1 % (ref 0–2)
LYMPHOCYTES # BLD AUTO: 0.62 THOUSANDS/ÂΜL (ref 0.6–4.47)
LYMPHOCYTES NFR BLD AUTO: 11 % (ref 14–44)
MCH RBC QN AUTO: 31.9 PG (ref 26.8–34.3)
MCHC RBC AUTO-ENTMCNC: 33 G/DL (ref 31.4–37.4)
MCV RBC AUTO: 97 FL (ref 82–98)
MONOCYTES # BLD AUTO: 0.63 THOUSAND/ÂΜL (ref 0.17–1.22)
MONOCYTES NFR BLD AUTO: 11 % (ref 4–12)
NEUTROPHILS # BLD AUTO: 4.19 THOUSANDS/ÂΜL (ref 1.85–7.62)
NEUTS SEG NFR BLD AUTO: 74 % (ref 43–75)
NRBC BLD AUTO-RTO: 0 /100 WBCS
PLATELET # BLD AUTO: 296 THOUSANDS/UL (ref 149–390)
PMV BLD AUTO: 10.7 FL (ref 8.9–12.7)
POTASSIUM SERPL-SCNC: 4 MMOL/L (ref 3.5–5.3)
PROT SERPL-MCNC: 6.5 G/DL (ref 6.4–8.4)
RBC # BLD AUTO: 4.54 MILLION/UL (ref 3.88–5.62)
SODIUM SERPL-SCNC: 133 MMOL/L (ref 135–147)
WBC # BLD AUTO: 5.59 THOUSAND/UL (ref 4.31–10.16)

## 2023-12-26 PROCEDURE — 80053 COMPREHEN METABOLIC PANEL: CPT

## 2023-12-26 PROCEDURE — 36415 COLL VENOUS BLD VENIPUNCTURE: CPT

## 2023-12-26 PROCEDURE — 86140 C-REACTIVE PROTEIN: CPT

## 2023-12-26 PROCEDURE — 85025 COMPLETE CBC W/AUTO DIFF WBC: CPT

## 2023-12-26 PROCEDURE — 85652 RBC SED RATE AUTOMATED: CPT

## 2023-12-28 DIAGNOSIS — E11.8 TYPE 2 DIABETES MELLITUS WITH COMPLICATION, WITHOUT LONG-TERM CURRENT USE OF INSULIN (HCC): ICD-10-CM

## 2023-12-28 RX ORDER — EMPAGLIFLOZIN 10 MG/1
10 TABLET, FILM COATED ORAL EVERY MORNING
Qty: 30 TABLET | Refills: 0 | Status: SHIPPED | OUTPATIENT
Start: 2023-12-28 | End: 2024-01-03 | Stop reason: SDUPTHER

## 2023-12-29 ENCOUNTER — APPOINTMENT (EMERGENCY)
Dept: RADIOLOGY | Facility: HOSPITAL | Age: 48
End: 2023-12-29
Payer: MEDICARE

## 2023-12-29 ENCOUNTER — HOSPITAL ENCOUNTER (EMERGENCY)
Facility: HOSPITAL | Age: 48
Discharge: HOME | End: 2023-12-29
Attending: EMERGENCY MEDICINE
Payer: MEDICARE

## 2023-12-29 VITALS
OXYGEN SATURATION: 96 % | WEIGHT: 170.3 LBS | BODY MASS INDEX: 22.47 KG/M2 | SYSTOLIC BLOOD PRESSURE: 141 MMHG | HEART RATE: 90 BPM | DIASTOLIC BLOOD PRESSURE: 82 MMHG | TEMPERATURE: 97.8 F | RESPIRATION RATE: 18 BRPM

## 2023-12-29 DIAGNOSIS — J06.9 UPPER RESPIRATORY TRACT INFECTION, UNSPECIFIED TYPE: Primary | ICD-10-CM

## 2023-12-29 LAB — SARS-COV-2 RNA RESP QL NAA+PROBE: NEGATIVE

## 2023-12-29 PROCEDURE — 99283 EMERGENCY DEPT VISIT LOW MDM: CPT

## 2023-12-29 PROCEDURE — 87635 SARS-COV-2 COVID-19 AMP PRB: CPT | Performed by: EMERGENCY MEDICINE

## 2023-12-29 PROCEDURE — 99284 EMERGENCY DEPT VISIT MOD MDM: CPT | Performed by: EMERGENCY MEDICINE

## 2023-12-29 PROCEDURE — 71046 X-RAY EXAM CHEST 2 VIEWS: CPT

## 2023-12-29 NOTE — Clinical Note
Jamie Mujica was seen and treated in our emergency department on 12/29/2023.                Diagnosis:     Jamie  .    He may return on this date: 01/01/2024         If you have any questions or concerns, please don't hesitate to call.      Nikhil Price MD    ______________________________           _______________          _______________  Hospital Representative                              Date                                Time

## 2023-12-29 NOTE — ED PROVIDER NOTES
History  Chief Complaint   Patient presents with    Cough     Cough, nasal congestion and chest congestion for one month - taking Nyquil      48-year-old male, presents with 2 weeks of cough and congestion.  Patient states he has frequent coughing, feels congested in the nose and chest.  Has chills, denies any known fevers.      History provided by:  Patient   used: No    Cough  Associated symptoms: chills        Prior to Admission Medications   Prescriptions Last Dose Informant Patient Reported? Taking?   Accu-Chek FastClix Lancets MISC   No No   Sig: Use 2 (two) times a day   Blood Glucose Monitoring Suppl (Accu-Chek Guide) w/Device KIT   No No   Sig: Use 2 (two) times a day   Empagliflozin (Jardiance) 10 MG TABS tablet   No No   Sig: take 1 tablet by mouth every morning   QUEtiapine (SEROQUEL) 200 mg tablet  Self Yes No   Sig: daily as needed    QUEtiapine (SEROquel) 300 mg tablet   Yes No   Sig: Take by mouth daily as needed   Xeljanz XR 11 MG TB24   Yes No   Sig: Take 1 tablet by mouth daily   albuterol (Ventolin HFA) 90 mcg/act inhaler   No No   Sig: Inhale 2 puffs every 6 (six) hours as needed for wheezing   ergocalciferol (VITAMIN D2) 50,000 units   No No   Sig: take 1 capsule by mouth every week   fluticasone (FLONASE) 50 mcg/act nasal spray   No No   Si spray into each nostril daily   glucose blood (Accu-Chek Guide) test strip   No No   Sig: Use as instructed   leflunomide (ARAVA) 20 MG tablet   Yes No   Sig: Take 20 mg by mouth daily   loratadine (CLARITIN) 10 mg tablet   No No   Sig: Take 1 tablet (10 mg total) by mouth daily   meloxicam (MOBIC) 15 mg tablet   No No   Sig: Take 1 tablet (15 mg total) by mouth daily   metFORMIN (GLUCOPHAGE) 1000 MG tablet   No No   Sig: take 1 tablet by mouth twice a day with meals   minoxidil (ROGAINE) 2 % external solution   No No   Sig: Apply topically 2 (two) times a day   propranolol (INDERAL) 10 mg tablet   Yes No   rosuvastatin (CRESTOR) 10  MG tablet   No No   Sig: take 1 tablet by mouth once daily   sitaGLIPtin (Januvia) 100 mg tablet   No No   Sig: Take 1 tablet (100 mg total) by mouth daily      Facility-Administered Medications: None       Past Medical History:   Diagnosis Date    Abscess of lower leg 8/23/2020    Management per cellulitis above.    Back muscle spasm 10/21/2021    Dental abscess 3/14/2022    Elbow laceration, left, initial encounter 7/7/2021    Foot pain, bilateral 11/21/2018    GERD (gastroesophageal reflux disease) 2/5/2019    Presentation suspicious for GERD Will admister 8 week trial of PPi and workup as needed.    Venous stasis dermatitis of left lower extremity 11/6/2019       History reviewed. No pertinent surgical history.    History reviewed. No pertinent family history.  I have reviewed and agree with the history as documented.    E-Cigarette/Vaping     E-Cigarette/Vaping Substances     Social History     Tobacco Use    Smoking status: Former     Current packs/day: 0.00     Types: Cigarettes     Quit date: 8/29/2013     Years since quitting: 10.3    Smokeless tobacco: Never   Substance Use Topics    Alcohol use: Not Currently    Drug use: Not Currently     Types: Marijuana       Review of Systems   Constitutional:  Positive for chills.   HENT:  Positive for congestion.    Respiratory:  Positive for cough.    Gastrointestinal: Negative.        Physical Exam  Physical Exam  Vitals and nursing note reviewed.   Constitutional:       General: He is not in acute distress.  HENT:      Head: Normocephalic and atraumatic.      Mouth/Throat:      Mouth: Mucous membranes are moist.      Pharynx: Oropharynx is clear. No oropharyngeal exudate or posterior oropharyngeal erythema.   Eyes:      Extraocular Movements: Extraocular movements intact.      Conjunctiva/sclera: Conjunctivae normal.      Pupils: Pupils are equal, round, and reactive to light.   Cardiovascular:      Rate and Rhythm: Normal rate and regular rhythm.   Pulmonary:       Effort: Pulmonary effort is normal.      Breath sounds: Normal breath sounds.   Abdominal:      Palpations: Abdomen is soft.      Tenderness: There is no abdominal tenderness.   Musculoskeletal:         General: Normal range of motion.   Skin:     General: Skin is warm and dry.   Neurological:      General: No focal deficit present.      Mental Status: He is alert and oriented to person, place, and time.      Motor: No weakness.      Gait: Gait normal.         Vital Signs  ED Triage Vitals [12/29/23 1305]   Temperature Pulse Respirations Blood Pressure SpO2   97.8 °F (36.6 °C) 90 18 141/82 96 %      Temp Source Heart Rate Source Patient Position - Orthostatic VS BP Location FiO2 (%)   Tympanic Monitor Sitting Left arm --      Pain Score       --           Vitals:    12/29/23 1305   BP: 141/82   Pulse: 90   Patient Position - Orthostatic VS: Sitting         Visual Acuity      ED Medications  Medications - No data to display    Diagnostic Studies  Results Reviewed       Procedure Component Value Units Date/Time    COVID only [[id number]] Collected: 12/29/23 1426    Lab Status: In process Specimen: Nares from Nose Updated: 12/29/23 1431                   XR chest 2 views    (Results Pending)              Procedures  Procedures         ED Course  ED Course as of 12/29/23 1432   Fri Dec 29, 2023   1423 Chest x-ray dependently reviewed by myself, no infiltrate or effusion, no acute findings.   1431 Patient comfortable, repeat exam normal respiratory effort, lungs clear.  Patient requesting COVID testing which has been performed.  Patient with likely URI, instructed to continue over-the-counter medications as needed, follow-up with primary doctor.                               SBIRT 20yo+      Flowsheet Row Most Recent Value   Initial Alcohol Screen: US AUDIT-C     1. How often do you have a drink containing alcohol? 0 Filed at: 12/29/2023 1410   2. How many drinks containing alcohol do you have on a typical day you are  drinking?  0 Filed at: 12/29/2023 1410   3a. Male UNDER 65: How often do you have five or more drinks on one occasion? 0 Filed at: 12/29/2023 1410   3b. FEMALE Any Age, or MALE 65+: How often do you have 4 or more drinks on one occassion? 0 Filed at: 12/29/2023 1410   Audit-C Score 0 Filed at: 12/29/2023 1410   MINA: How many times in the past year have you...    Used an illegal drug or used a prescription medication for non-medical reasons? Never Filed at: 12/29/2023 1410                      Medical Decision Making  48-year-old male, presents with 2 weeks of cough and congestion.  Differential diagnosis includes pneumonia, bronchitis, URI among other diagnoses.  Patient states he is concerned because he is not getting better for 2 weeks.  Using over-the-counter medications with minimal relief.  Patient appears comfortable in no distress, lungs clear with no wheezing.  Chest x-ray ordered.    Amount and/or Complexity of Data Reviewed  Labs: ordered. Decision-making details documented in ED Course.  Radiology: ordered and independent interpretation performed. Decision-making details documented in ED Course.             Disposition  Final diagnoses:   Upper respiratory tract infection, unspecified type     Time reflects when diagnosis was documented in both MDM as applicable and the Disposition within this note       Time User Action Codes Description Comment    12/29/2023  2:23 PM Nikhil Price Add [J06.9] Upper respiratory tract infection, unspecified type           ED Disposition       ED Disposition   Discharge    Condition   Stable    Date/Time   Fri Dec 29, 2023 1423    Comment   Jamie Mujica Jr. discharge to home/self care.                   Follow-up Information       Follow up With Specialties Details Why Contact Info    Thom Vargas MD Family Medicine   [address] [place] [id number]  [phone]              Discharge Medication List as of 12/29/2023  2:23 PM         CONTINUE these medications which have NOT CHANGED    Details   Accu-Chek FastClix Lancets MISC Use 2 (two) times a day, Starting Tue 6/7/2022, Normal      albuterol (Ventolin HFA) 90 mcg/act inhaler Inhale 2 puffs every 6 (six) hours as needed for wheezing, Starting Thu 2/16/2023, Normal      Blood Glucose Monitoring Suppl (Accu-Chek Guide) w/Device KIT Use 2 (two) times a day, Starting Tue 6/7/2022, Normal      Empagliflozin (Jardiance) 10 MG TABS tablet take 1 tablet by mouth every morning, Starting Thu 12/28/2023, Normal      ergocalciferol (VITAMIN D2) 50,000 units take 1 capsule by mouth every week, Normal      fluticasone (FLONASE) 50 mcg/act nasal spray 1 spray into each nostril daily, Starting Wed 12/7/2022, Normal      glucose blood (Accu-Chek Guide) test strip Use as instructed, Normal      leflunomide (ARAVA) 20 MG tablet Take 20 mg by mouth daily, Starting Mon 8/27/2018, Historical Med      loratadine (CLARITIN) 10 mg tablet Take 1 tablet (10 mg total) by mouth daily, Starting Thu 2/16/2023, Normal      meloxicam (MOBIC) 15 mg tablet Take 1 tablet (15 mg total) by mouth daily, Starting Mon 10/3/2022, No Print      metFORMIN (GLUCOPHAGE) 1000 MG tablet take 1 tablet by mouth twice a day with meals, Normal      minoxidil (ROGAINE) 2 % external solution Apply topically 2 (two) times a day, Starting Wed 6/28/2023, Normal      propranolol (INDERAL) 10 mg tablet Starting Thu 7/2/2020, Historical Med      !! QUEtiapine (SEROQUEL) 200 mg tablet daily as needed , Historical Med      !! QUEtiapine (SEROquel) 300 mg tablet Take by mouth daily as needed, Starting Wed 8/21/2019, Historical Med      rosuvastatin (CRESTOR) 10 MG tablet take 1 tablet by mouth once daily, Normal      sitaGLIPtin (Januvia) 100 mg tablet Take 1 tablet (100 mg total) by mouth daily, Starting Thu 10/5/2023, Normal      Xeljanz XR 11 MG TB24 Take 1 tablet by mouth daily, Starting Wed 9/7/2022, Historical Med       !! - Potential duplicate  medications found. Please discuss with provider.          No discharge procedures on file.    PDMP Review       None            ED Provider  Electronically Signed by             Nikhil Price MD  12/29/23 1297

## 2024-01-03 ENCOUNTER — OFFICE VISIT (OUTPATIENT)
Dept: FAMILY MEDICINE CLINIC | Facility: CLINIC | Age: 49
End: 2024-01-03

## 2024-01-03 VITALS
DIASTOLIC BLOOD PRESSURE: 80 MMHG | HEIGHT: 72 IN | RESPIRATION RATE: 16 BRPM | WEIGHT: 167 LBS | HEART RATE: 100 BPM | SYSTOLIC BLOOD PRESSURE: 118 MMHG | BODY MASS INDEX: 22.62 KG/M2 | TEMPERATURE: 98 F | OXYGEN SATURATION: 96 %

## 2024-01-03 DIAGNOSIS — Z00.00 MEDICARE ANNUAL WELLNESS VISIT, SUBSEQUENT: ICD-10-CM

## 2024-01-03 DIAGNOSIS — M06.9 RHEUMATOID ARTHRITIS INVOLVING MULTIPLE SITES, UNSPECIFIED WHETHER RHEUMATOID FACTOR PRESENT (HCC): ICD-10-CM

## 2024-01-03 DIAGNOSIS — E55.9 VITAMIN D DEFICIENCY: ICD-10-CM

## 2024-01-03 DIAGNOSIS — L65.0 TELOGEN EFFLUVIUM: ICD-10-CM

## 2024-01-03 DIAGNOSIS — E11.8 TYPE 2 DIABETES MELLITUS WITH COMPLICATION, WITHOUT LONG-TERM CURRENT USE OF INSULIN (HCC): ICD-10-CM

## 2024-01-03 DIAGNOSIS — R05.8 POST-VIRAL COUGH SYNDROME: Primary | ICD-10-CM

## 2024-01-03 LAB — SL AMB POCT HEMOGLOBIN AIC: 8.2 (ref ?–6.5)

## 2024-01-03 PROCEDURE — 83036 HEMOGLOBIN GLYCOSYLATED A1C: CPT | Performed by: FAMILY MEDICINE

## 2024-01-03 PROCEDURE — 99214 OFFICE O/P EST MOD 30 MIN: CPT | Performed by: FAMILY MEDICINE

## 2024-01-03 PROCEDURE — G0439 PPPS, SUBSEQ VISIT: HCPCS | Performed by: FAMILY MEDICINE

## 2024-01-03 RX ORDER — GUAIFENESIN, PSEUDOEPHEDRINE HYDROCHLORIDE 600; 60 MG/1; MG/1
1 TABLET, EXTENDED RELEASE ORAL EVERY 12 HOURS
Qty: 30 TABLET | Refills: 1 | Status: SHIPPED | OUTPATIENT
Start: 2024-01-03

## 2024-01-03 RX ORDER — MELATONIN
2000 DAILY
Qty: 60 TABLET | Refills: 1 | Status: SHIPPED | OUTPATIENT
Start: 2024-01-03

## 2024-01-03 NOTE — PROGRESS NOTES
Assessment and Plan:     Problem List Items Addressed This Visit       Rheumatoid arthritis (HCC)     -Well controlled  -On Xeljanz  -Follows with rheumatology outpatient         Type 2 diabetes mellitus with complication, without long-term current use of insulin ([facility])       Lab Results   Component Value Date    HGBA1C 8.2 (A) 01/03/2024   Not at goal  Hemoglobin A1c goal of less than 7  Continue Jardiance, Januvia and metformin  Low carbohydrate meal plan discussed with patient  Continue statin         Relevant Medications    Empagliflozin (Jardiance) 10 MG TABS tablet    Other Relevant Orders    POCT hemoglobin A1c (Completed)    Telogen effluvium     Reviewed prior labs including B12, thyroid function, electrolytes, and iron panel which was unremarkable  Vitamin D deficiency noted on labs and he is receiving supplements  Recommend minoxidil  Recommend trial of biotin supplement for hair growth         Relevant Medications    minoxidil (ROGAINE) 2 % external solution    Vitamin D deficiency    Relevant Medications    cholecalciferol (VITAMIN D3) 1,000 units tablet     Other Visit Diagnoses       Post-viral cough syndrome    -  Primary    Relevant Medications    pseudoephedrine-guaifenesin (MUCINEX D)  MG per tablet    Medicare annual wellness visit, subsequent                Depression Screening and Follow-up Plan: Patient was screened for depression during today's encounter. They screened negative with a PHQ-2 score of 0.      Preventive health issues were discussed with patient, and age appropriate screening tests were ordered as noted in patient's After Visit Summary.  Personalized health advice and appropriate referrals for health education or preventive services given if needed, as noted in patient's After Visit Summary.     History of Present Illness:     Patient presents for a Medicare Wellness Visit    48-year-old male with a history of rheumatoid arthritis, type 2 diabetes and hyperlipidemia who  presents today for Medicare annual wellness and general follow-up.  He reports that he is doing okay.  He reports that he had upper respiratory infection for the past couple of weeks.  He continues to have dry cough and chest congestion.  He had chest x-ray done in the ED which was negative.  He is taking over-the-counter cough medicine without much improvement.  He continues to experience hair loss.  He was prescribed topical Rogaine but he reports that he did not receive this at his pharmacy.  He reports that he has been eating a lot of carbohydrate the past couple of weeks with the holidays.  He is expecting his A1c to be high today.  He has been taking his medications regularly.       Patient Care Team:  Thom Vargas MD as PCP - General (Family Medicine)     Review of Systems:     Review of Systems   Constitutional:  Negative for chills, diaphoresis, fatigue and fever.   HENT:  Positive for congestion.    Eyes:  Negative for visual disturbance.   Respiratory:  Positive for cough. Negative for shortness of breath and wheezing.    Cardiovascular:  Negative for chest pain and palpitations.   Gastrointestinal:  Negative for abdominal pain, constipation, diarrhea, nausea and vomiting.   Musculoskeletal:  Negative for back pain.   Skin:  Negative for rash.   Neurological:  Negative for dizziness, syncope, numbness and headaches.   Psychiatric/Behavioral:  Negative for agitation.         Problem List:     Patient Active Problem List   Diagnosis    Rheumatoid arthritis (HCC)    Type 2 diabetes mellitus with complication, without long-term current use of insulin (HCC)    Hypertriglyceridemia    Telogen effluvium    Vitamin D deficiency      Past Medical and Surgical History:     Past Medical History:   Diagnosis Date    Abscess of lower leg 8/23/2020    Management per cellulitis above.    Back muscle spasm 10/21/2021    Dental abscess 3/14/2022    Elbow laceration, left, initial encounter 7/7/2021    Foot  pain, bilateral 11/21/2018    GERD (gastroesophageal reflux disease) 2/5/2019    Presentation suspicious for GERD Will admister 8 week trial of PPi and workup as needed.    Venous stasis dermatitis of left lower extremity 11/6/2019     History reviewed. No pertinent surgical history.   Family History:     History reviewed. No pertinent family history.   Social History:     Social History     Socioeconomic History    Marital status: Single     Spouse name: None    Number of children: None    Years of education: None    Highest education level: None   Occupational History    None   Tobacco Use    Smoking status: Former     Current packs/day: 0.00     Types: Cigarettes     Quit date: 8/29/2013     Years since quitting: 10.3    Smokeless tobacco: Never   Substance and Sexual Activity    Alcohol use: Not Currently    Drug use: Not Currently     Types: Marijuana    Sexual activity: None   Other Topics Concern    None   Social History Narrative    None     Social Determinants of Health     Financial Resource Strain: Low Risk  (1/3/2024)    Overall Financial Resource Strain (CARDIA)     Difficulty of Paying Living Expenses: Not hard at all   Food Insecurity: No Food Insecurity (1/3/2024)    Hunger Vital Sign     Worried About Running Out of Food in the Last Year: Never true     Ran Out of Food in the Last Year: Never true   Transportation Needs: No Transportation Needs (1/3/2024)    PRAPARE - Transportation     Lack of Transportation (Medical): No     Lack of Transportation (Non-Medical): No   Physical Activity: Not on file   Stress: Not on file   Social Connections: Not on file   Intimate Partner Violence: Not on file   Housing Stability: Not on file      Medications and Allergies:     Current Outpatient Medications   Medication Sig Dispense Refill    cholecalciferol (VITAMIN D3) 1,000 units tablet Take 2 tablets (2,000 Units total) by mouth daily 60 tablet 1    Empagliflozin (Jardiance) 10 MG TABS tablet Take 1 tablet (10  mg total) by mouth every morning 90 tablet 1    minoxidil (ROGAINE) 2 % external solution Apply topically 2 (two) times a day 60 mL 1    pseudoephedrine-guaifenesin (MUCINEX D)  MG per tablet Take 1 tablet by mouth every 12 (twelve) hours 30 tablet 1    Accu-Chek FastClix Lancets MISC Use 2 (two) times a day 100 each 5    albuterol (Ventolin HFA) 90 mcg/act inhaler Inhale 2 puffs every 6 (six) hours as needed for wheezing 18 g 1    Blood Glucose Monitoring Suppl (Accu-Chek Guide) w/Device KIT Use 2 (two) times a day 1 kit 0    ergocalciferol (VITAMIN D2) 50,000 units take 1 capsule by mouth every week 8 capsule 0    fluticasone (FLONASE) 50 mcg/act nasal spray 1 spray into each nostril daily 11.1 mL 0    glucose blood (Accu-Chek Guide) test strip Use as instructed 100 strip 5    leflunomide (ARAVA) 20 MG tablet Take 20 mg by mouth daily  0    loratadine (CLARITIN) 10 mg tablet Take 1 tablet (10 mg total) by mouth daily 100 tablet 0    meloxicam (MOBIC) 15 mg tablet Take 1 tablet (15 mg total) by mouth daily 30 tablet 2    metFORMIN (GLUCOPHAGE) 1000 MG tablet take 1 tablet by mouth twice a day with meals 180 tablet 1    propranolol (INDERAL) 10 mg tablet       QUEtiapine (SEROQUEL) 200 mg tablet daily as needed       QUEtiapine (SEROquel) 300 mg tablet Take by mouth daily as needed      rosuvastatin (CRESTOR) 10 MG tablet take 1 tablet by mouth once daily 90 tablet 5    sitaGLIPtin (Januvia) 100 mg tablet Take 1 tablet (100 mg total) by mouth daily 90 tablet 1    Xeljanz XR 11 MG TB24 Take 1 tablet by mouth daily       No current facility-administered medications for this visit.     No Known Allergies   Immunizations:     Immunization History   Administered Date(s) Administered    COVID-19 MODERNA VACC 0.25 ML IM BOOSTER 09/20/2022    COVID-19 MODERNA VACC 0.5 ML IM 06/28/2021, 07/07/2021    COVID-19 Pfizer Vac BIVALENT [place]-sucrose 12 Yr+ IM 01/05/2023    INFLUENZA 10/29/2013, 10/27/2014, 10/27/2014,  10/20/2016, 10/20/2016, 10/18/2017, 10/29/2019    Influenza Split 10/29/2013    Influenza, injectable, quadrivalent, preservative free 0.5 mL 02/05/2019, 10/05/2023    Influenza, recombinant, quadrivalent,injectable, preservative free 10/03/2022    Pneumococcal Conjugate 13-Valent 02/05/2019    Pneumococcal Conjugate Vaccine 20-valent (Pcv20), Polysace 10/03/2022    Pneumococcal Polysaccharide PPV23 05/20/2014    Tdap 03/28/2014, 07/07/2021      Health Maintenance:         Topic Date Due    Colorectal Cancer Screening  Never done    HIV Screening  Completed    Hepatitis C Screening  Completed         Topic Date Due    COVID-19 Vaccine (4 - 2023-24 season) 09/01/2023      Medicare Screening Tests and Risk Assessments:     Jamie is here for his Subsequent Wellness visit.     Health Risk Assessment:   Patient rates overall health as fair. Patient feels that their physical health rating is slightly better. Patient is satisfied with their life. Eyesight was rated as same. Hearing was rated as same. Patient feels that their emotional and mental health rating is much better. Patients states they are never, rarely angry. Patient states they are sometimes unusually tired/fatigued. Pain experienced in the last 7 days has been some. Patient's pain rating has been 6/10. Patient states that he has experienced no weight loss or gain in last 6 months.     Depression Screening:   PHQ-2 Score: 0      Fall Risk Screening:   In the past year, patient has experienced: no history of falling in past year      Home Safety:  Patient does not have trouble with stairs inside or outside of their home. Patient has working smoke alarms and has working carbon monoxide detector. Home safety hazards include: none.     Nutrition:   Current diet is Regular.     Medications:   Patient is not currently taking any over-the-counter supplements. Patient is able to manage medications.     Activities of Daily Living (ADLs)/Instrumental Activities of Daily  Living (IADLs):   Walk and transfer into and out of bed and chair?: Yes  Dress and groom yourself?: Yes    Bathe or shower yourself?: Yes    Feed yourself? Yes  Do your laundry/housekeeping?: Yes  Manage your money, pay your bills and track your expenses?: Yes  Make your own meals?: Yes    Do your own shopping?: Yes    Previous Hospitalizations:   Any hospitalizations or ED visits within the last 12 months?: Yes    How many hospitalizations have you had in the last year?: 1-2    Advance Care Planning:   Living will: Yes    Advanced directive: Yes      Comments: Five wishes Form given    Cognitive Screening:   Provider or family/friend/caregiver concerned regarding cognition?: No    PREVENTIVE SCREENINGS      Cardiovascular Screening:    General: Screening Not Indicated and History Lipid Disorder      Diabetes Screening:     General: Screening Not Indicated and History Diabetes      Colorectal Cancer Screening:     General: Risks and Benefits Discussed    Due for: Colonoscopy - Low Risk      Prostate Cancer Screening:    General: Screening Not Indicated      Osteoporosis Screening:    General: Screening Not Indicated      Abdominal Aortic Aneurysm (AAA) Screening:    Risk factors include: tobacco use        General: Screening Not Indicated      Lung Cancer Screening:     General: Screening Not Indicated      Hepatitis C Screening:    General: Screening Current    Screening, Brief Intervention, and Referral to Treatment (SBIRT)    Screening  Typical number of drinks in a day: 0  Typical number of drinks in a week: 0  Interpretation: Low risk drinking behavior.    Single Item Drug Screening:  How often have you used an illegal drug (including marijuana) or a prescription medication for non-medical reasons in the past year? never    Single Item Drug Screen Score: 0  Interpretation: Negative screen for possible drug use disorder    Other Counseling Topics:   Car/seat belt/driving safety, sunscreen and regular  weightbearing exercise.     No results found.     Physical Exam:     /80 (BP Location: Left arm, Patient Position: Sitting, Cuff Size: Large)   Pulse 100   Temp 98 °F (36.7 °C) (Temporal)   Resp 16   Ht 6' (1.829 m)   Wt 75.8 kg (167 lb)   SpO2 96%   BMI 22.65 kg/m²     Physical Exam  Vitals reviewed.   Constitutional:       General: He is not in acute distress.     Appearance: Normal appearance. He is well-developed. He is not ill-appearing, toxic-appearing or diaphoretic.   HENT:      Head: Normocephalic and atraumatic.      Right Ear: Tympanic membrane, ear canal and external ear normal. There is no impacted cerumen.      Left Ear: Tympanic membrane, ear canal and external ear normal. There is no impacted cerumen.      Nose: Congestion present.      Mouth/Throat:      Mouth: Mucous membranes are moist.   Eyes:      General: No scleral icterus.        Right eye: No discharge.         Left eye: No discharge.      Extraocular Movements: Extraocular movements intact.      Conjunctiva/sclera: Conjunctivae normal.      Pupils: Pupils are equal, round, and reactive to light.   Cardiovascular:      Rate and Rhythm: Normal rate and regular rhythm.      Heart sounds: Normal heart sounds. No murmur heard.     No friction rub. No gallop.   Pulmonary:      Effort: Pulmonary effort is normal. No respiratory distress.      Breath sounds: Normal breath sounds. No stridor. No wheezing or rhonchi.   Abdominal:      General: Bowel sounds are normal. There is no distension.      Palpations: Abdomen is soft.      Tenderness: There is no abdominal tenderness. There is no guarding.   Musculoskeletal:         General: Normal range of motion.      Cervical back: Normal range of motion.      Right lower leg: No edema.   Skin:     General: Skin is warm.      Capillary Refill: Capillary refill takes less than 2 seconds.   Neurological:      General: No focal deficit present.      Mental Status: He is alert and oriented to  person, place, and time.      Motor: No weakness.      Gait: Gait normal.   Psychiatric:         Mood and Affect: Mood normal.         Behavior: Behavior normal.          Thom Vargas MD

## 2024-01-03 NOTE — ASSESSMENT & PLAN NOTE
Lab Results   Component Value Date    HGBA1C 8.2 (A) 01/03/2024   Not at goal  Hemoglobin A1c goal of less than 7  Continue Jardiance, Januvia and metformin  Low carbohydrate meal plan discussed with patient  Continue statin

## 2024-01-03 NOTE — ASSESSMENT & PLAN NOTE
Reviewed prior labs including B12, thyroid function, electrolytes, and iron panel which was unremarkable  Vitamin D deficiency noted on labs and he is receiving supplements  Recommend minoxidil  Recommend trial of biotin supplement for hair growth

## 2024-01-03 NOTE — PATIENT INSTRUCTIONS
Medicare Preventive Visit Patient Instructions  Thank you for completing your Welcome to Medicare Visit or Medicare Annual Wellness Visit today. Your next wellness visit will be due in one year (1/3/2025).  The screening/preventive services that you may require over the next 5-10 years are detailed below. Some tests may not apply to you based off risk factors and/or age. Screening tests ordered at today's visit but not completed yet may show as past due. Also, please note that scanned in results may not display below.  Preventive Screenings:  Service Recommendations Previous Testing/Comments   Colorectal Cancer Screening  Colonoscopy    Fecal Occult Blood Test (FOBT)/Fecal Immunochemical Test (FIT)  Fecal DNA/Cologuard Test  Flexible Sigmoidoscopy Age: 45-75 years old   Colonoscopy: every 10 years (May be performed more frequently if at higher risk)  OR  FOBT/FIT: every 1 year  OR  Cologuard: every 3 years  OR  Sigmoidoscopy: every 5 years  Screening may be recommended earlier than age 45 if at higher risk for colorectal cancer. Also, an individualized decision between you and your healthcare provider will decide whether screening between the ages of 76-85 would be appropriate. Colonoscopy: Not on file  FOBT/FIT: Not on file  Cologuard: Not on file  Sigmoidoscopy: Not on file          Prostate Cancer Screening Individualized decision between patient and health care provider in men between ages of 55-69   Medicare will cover every 12 months beginning on the day after your 50th birthday PSA: No results in last 5 years     Screening Not Indicated     Hepatitis C Screening Once for adults born between 1945 and 1965  More frequently in patients at high risk for Hepatitis C Hep C Antibody: 10/31/2022    Screening Current   Diabetes Screening 1-2 times per year if you're at risk for diabetes or have pre-diabetes Fasting glucose: 227 mg/dL (8/22/2023)  A1C: 7.1 (10/5/2023)  Screening Not Indicated  History Diabetes    Cholesterol Screening Once every 5 years if you don't have a lipid disorder. May order more often based on risk factors. Lipid panel: 08/23/2023  Screening Not Indicated  History Lipid Disorder      Other Preventive Screenings Covered by Medicare:  Abdominal Aortic Aneurysm (AAA) Screening: covered once if your at risk. You're considered to be at risk if you have a family history of AAA or a male between the age of 65-75 who smoking at least 100 cigarettes in your lifetime.  Lung Cancer Screening: covers low dose CT scan once per year if you meet all of the following conditions: (1) Age 55-77; (2) No signs or symptoms of lung cancer; (3) Current smoker or have quit smoking within the last 15 years; (4) You have a tobacco smoking history of at least 20 pack years (packs per day x number of years you smoked); (5) You get a written order from a healthcare provider.  Glaucoma Screening: covered annually if you're considered high risk: (1) You have diabetes OR (2) Family history of glaucoma OR (3)  aged 50 and older OR (4)  American aged 65 and older  Osteoporosis Screening: covered every 2 years if you meet one of the following conditions: (1) Have a vertebral abnormality; (2) On glucocorticoid therapy for more than 3 months; (3) Have primary hyperparathyroidism; (4) On osteoporosis medications and need to assess response to drug therapy.  HIV Screening: covered annually if you're between the age of 15-65. Also covered annually if you are younger than 15 and older than 65 with risk factors for HIV infection. For pregnant patients, it is covered up to 3 times per pregnancy.    Immunizations:  Immunization Recommendations   Influenza Vaccine Annual influenza vaccination during flu season is recommended for all persons aged >= 6 months who do not have contraindications   Pneumococcal Vaccine   * Pneumococcal conjugate vaccine = PCV13 (Prevnar 13), PCV15 (Vaxneuvance), PCV20 (Prevnar 20)  *  Pneumococcal polysaccharide vaccine = PPSV23 (Pneumovax) Adults 19-65 yo with certain risk factors or if 65+ yo  If never received any pneumonia vaccine: recommend Prevnar 20 (PCV20)  Give PCV20 if previously received 1 dose of PCV13 or PPSV23   Hepatitis B Vaccine 3 dose series if at intermediate or high risk (ex: diabetes, end stage renal disease, liver disease)   Respiratory syncytial virus (RSV) Vaccine - COVERED BY MEDICARE PART D  * RSVPreF3 (Arexvy) CDC recommends that adults 60 years of age and older may receive a single dose of RSV vaccine using shared clinical decision-making (SCDM)   Tetanus (Td) Vaccine - COST NOT COVERED BY MEDICARE PART B Following completion of primary series, a booster dose should be given every 10 years to maintain immunity against tetanus. Td may also be given as tetanus wound prophylaxis.   Tdap Vaccine - COST NOT COVERED BY MEDICARE PART B Recommended at least once for all adults. For pregnant patients, recommended with each pregnancy.   Shingles Vaccine (Shingrix) - COST NOT COVERED BY MEDICARE PART B  2 shot series recommended in those 19 years and older who have or will have weakened immune systems or those 50 years and older     Health Maintenance Due:      Topic Date Due   • Colorectal Cancer Screening  Never done   • HIV Screening  Completed   • Hepatitis C Screening  Completed     Immunizations Due:      Topic Date Due   • COVID-19 Vaccine (4 - 2023-24 season) 09/01/2023     Advance Directives   What are advance directives?  Advance directives are legal documents that state your wishes and plans for medical care. These plans are made ahead of time in case you lose your ability to make decisions for yourself. Advance directives can apply to any medical decision, such as the treatments you want, and if you want to donate organs.   What are the types of advance directives?  There are many types of advance directives, and each state has rules about how to use them. You may  choose a combination of any of the following:  Living will:  This is a written record of the treatment you want. You can also choose which treatments you do not want, which to limit, and which to stop at a certain time. This includes surgery, medicine, IV fluid, and tube feedings.   Durable power of  for healthcare (DPAHC):  This is a written record that states who you want to make healthcare choices for you when you are unable to make them for yourself. This person, called a proxy, is usually a family member or a friend. You may choose more than 1 proxy.  Do not resuscitate (DNR) order:  A DNR order is used in case your heart stops beating or you stop breathing. It is a request not to have certain forms of treatment, such as CPR. A DNR order may be included in other types of advance directives.  Medical directive:  This covers the care that you want if you are in a coma, near death, or unable to make decisions for yourself. You can list the treatments you want for each condition. Treatment may include pain medicine, surgery, blood transfusions, dialysis, IV or tube feedings, and a ventilator (breathing machine).  Values history:  This document has questions about your views, beliefs, and how you feel and think about life. This information can help others choose the care that you would choose.  Why are advance directives important?  An advance directive helps you control your care. Although spoken wishes may be used, it is better to have your wishes written down. Spoken wishes can be misunderstood, or not followed. Treatments may be given even if you do not want them. An advance directive may make it easier for your family to make difficult choices about your care.       © Copyright [employer] 2018 Information is for End User's use only and may not be sold, redistributed or otherwise used for commercial purposes. All illustrations and images included in CareNotes® are the copyrighted property of  Krysten OLIVO. or [employer]

## 2024-01-05 ENCOUNTER — ANESTHESIA (OUTPATIENT)
Dept: ANESTHESIOLOGY | Facility: HOSPITAL | Age: 49
End: 2024-01-05

## 2024-01-05 ENCOUNTER — ANESTHESIA EVENT (OUTPATIENT)
Dept: ANESTHESIOLOGY | Facility: HOSPITAL | Age: 49
End: 2024-01-05

## 2024-01-08 ENCOUNTER — TELEPHONE (OUTPATIENT)
Dept: GASTROENTEROLOGY | Facility: CLINIC | Age: 49
End: 2024-01-08

## 2024-01-12 RX ORDER — SODIUM CHLORIDE 9 MG/ML
125 INJECTION, SOLUTION INTRAVENOUS CONTINUOUS
OUTPATIENT
Start: 2024-01-12

## 2024-01-23 ENCOUNTER — TELEPHONE (OUTPATIENT)
Dept: GASTROENTEROLOGY | Facility: MEDICAL CENTER | Age: 49
End: 2024-01-23

## 2024-02-23 ENCOUNTER — HOSPITAL ENCOUNTER (OUTPATIENT)
Dept: RADIOLOGY | Facility: HOSPITAL | Age: 49
End: 2024-02-23
Payer: MEDICARE

## 2024-02-23 DIAGNOSIS — M79.671 RIGHT FOOT PAIN: ICD-10-CM

## 2024-02-23 PROCEDURE — 73630 X-RAY EXAM OF FOOT: CPT

## 2024-03-25 DIAGNOSIS — E11.9 TYPE 2 DIABETES MELLITUS WITHOUT COMPLICATION, WITHOUT LONG-TERM CURRENT USE OF INSULIN (HCC): ICD-10-CM

## 2024-03-25 RX ORDER — SITAGLIPTIN 100 MG/1
100 TABLET, FILM COATED ORAL DAILY
Qty: 90 TABLET | Refills: 1 | Status: SHIPPED | OUTPATIENT
Start: 2024-03-25

## 2024-04-03 ENCOUNTER — OFFICE VISIT (OUTPATIENT)
Dept: FAMILY MEDICINE CLINIC | Facility: CLINIC | Age: 49
End: 2024-04-03

## 2024-04-03 VITALS
BODY MASS INDEX: 23.43 KG/M2 | RESPIRATION RATE: 18 BRPM | HEIGHT: 72 IN | HEART RATE: 96 BPM | OXYGEN SATURATION: 99 % | DIASTOLIC BLOOD PRESSURE: 96 MMHG | TEMPERATURE: 98.2 F | SYSTOLIC BLOOD PRESSURE: 152 MMHG | WEIGHT: 173 LBS

## 2024-04-03 DIAGNOSIS — M79.672 PAIN IN BOTH FEET: ICD-10-CM

## 2024-04-03 DIAGNOSIS — M21.941 ACQUIRED BILATERAL HAND DEFORMITY: ICD-10-CM

## 2024-04-03 DIAGNOSIS — M21.942 ACQUIRED BILATERAL HAND DEFORMITY: ICD-10-CM

## 2024-04-03 DIAGNOSIS — M79.671 PAIN IN BOTH FEET: ICD-10-CM

## 2024-04-03 DIAGNOSIS — E11.8 TYPE 2 DIABETES MELLITUS WITH COMPLICATION, WITHOUT LONG-TERM CURRENT USE OF INSULIN (HCC): Primary | ICD-10-CM

## 2024-04-03 DIAGNOSIS — M06.9 RHEUMATOID ARTHRITIS, INVOLVING UNSPECIFIED SITE, UNSPECIFIED WHETHER RHEUMATOID FACTOR PRESENT (HCC): ICD-10-CM

## 2024-04-03 DIAGNOSIS — Z23 NEED FOR COVID-19 VACCINE: ICD-10-CM

## 2024-04-03 LAB — SL AMB POCT HEMOGLOBIN AIC: 7.1 (ref ?–6.5)

## 2024-04-03 PROCEDURE — 90480 ADMN SARSCOV2 VAC 1/ONLY CMP: CPT | Performed by: FAMILY MEDICINE

## 2024-04-03 PROCEDURE — 83036 HEMOGLOBIN GLYCOSYLATED A1C: CPT | Performed by: FAMILY MEDICINE

## 2024-04-03 PROCEDURE — 99214 OFFICE O/P EST MOD 30 MIN: CPT | Performed by: FAMILY MEDICINE

## 2024-04-03 PROCEDURE — 91320 SARSCV2 VAC 30MCG TRS-SUC IM: CPT | Performed by: FAMILY MEDICINE

## 2024-04-03 RX ORDER — TOCILIZUMAB 180 MG/ML
162 INJECTION, SOLUTION SUBCUTANEOUS
COMMUNITY
Start: 2024-03-14

## 2024-04-03 RX ORDER — ESCITALOPRAM OXALATE 10 MG/1
10 TABLET ORAL DAILY
COMMUNITY
Start: 2024-03-06

## 2024-04-03 RX ORDER — CELECOXIB 200 MG/1
200 CAPSULE ORAL 2 TIMES DAILY
COMMUNITY
Start: 2024-01-08 | End: 2024-04-03 | Stop reason: SDUPTHER

## 2024-04-03 RX ORDER — CELECOXIB 200 MG/1
200 CAPSULE ORAL 2 TIMES DAILY
Qty: 60 CAPSULE | Refills: 1 | Status: SHIPPED | OUTPATIENT
Start: 2024-04-03

## 2024-04-03 NOTE — PROGRESS NOTES
Name: Jamie Mujica Jr.      : 1975      MRN: [id number]  Encounter Provider: Thom Vargas MD  Encounter Date: 4/3/2024   Encounter department: [facility]    Assessment & Plan     1. Type 2 diabetes mellitus with complication, without long-term current use of insulin (HCC)  Assessment & Plan:    Lab Results   Component Value Date    HGBA1C 7.1 (A) 2024   At goal  Hemoglobin A1c goal of less than 7  Continue Jardiance, Januvia and metformin  Low carbohydrate meal plan discussed with patient  Continue statin    Orders:  -     POCT hemoglobin A1c    2. Rheumatoid arthritis, involving unspecified site, unspecified whether rheumatoid factor present (HCC)  Assessment & Plan:  RA has been flaring up lately  Recommend celecoxib twice daily for 10 to 14 days  Continue Actemra biweekly injections  Recommend outpatient follow-up with rheumatology  Will send to patient for PT/OT for significant ulnar deviation of his bilateral hands          Orders:  -     Ambulatory Referral to Physical Therapy; Future  -     celecoxib (CeleBREX) 200 mg capsule; Take 1 capsule (200 mg total) by mouth 2 (two) times a day    3. Pain in both feet  -     Diclofenac Sodium (VOLTAREN) 1 %; Apply 2 g topically 3 (three) times a day as needed (foot pain)  -     Ambulatory Referral to Physical Therapy; Future  -     celecoxib (CeleBREX) 200 mg capsule; Take 1 capsule (200 mg total) by mouth 2 (two) times a day    4. Acquired bilateral hand deformity  -     Ambulatory Referral to PT/OT Hand Therapy; Future    5. Need for COVID-19 vaccine  -     COVID-19 Pfizer mRNA vaccine 12 yr and older (GRAY cap vial or pre-filled syringe)           Subjective      48-year-old male with a history of rheumatoid arthritis and type 2 diabetes who presents today for follow-up.  Patient reports that he is doing okay overall.  He has been taking his diabetes medications regularly.  He has been following a low  carbohydrate diet.  Patient reports that he has been experiencing more pain in his joints from his rheumatoid arthritis.  He was recently started on a new disease modifying treatment for rheumatoid arthritis.  He is following with rheumatology outpatient.  He is requesting medication for pain relief.  He has pain in his bilateral hands with deformity.  He also have pain in his bilateral feet.  He reports that the pain in his feet is affecting his ambulation.  His deformities are affecting his job performance.  He is requesting referral for PT OT evaluation      Review of Systems   Constitutional:  Negative for chills, diaphoresis, fatigue and fever.   Respiratory:  Negative for shortness of breath and wheezing.    Cardiovascular:  Negative for chest pain and palpitations.   Gastrointestinal:  Negative for abdominal pain, nausea and vomiting.   Musculoskeletal:  Positive for arthralgias, back pain and gait problem.   Skin:  Negative for rash.   Neurological:  Negative for syncope, facial asymmetry and numbness.   Psychiatric/Behavioral:  Negative for confusion.        Current Outpatient Medications on File Prior to Visit   Medication Sig    Actemra ACTPen 162 MG/0.9ML SOAJ Inject 162 mg as directed every 14 (fourteen) days    escitalopram (LEXAPRO) 10 mg tablet Take 10 mg by mouth daily    [DISCONTINUED] celecoxib (CeleBREX) 200 mg capsule Take 200 mg by mouth 2 (two) times a day    Accu-Chek FastClix Lancets MISC Use 2 (two) times a day    albuterol (Ventolin HFA) 90 mcg/act inhaler Inhale 2 puffs every 6 (six) hours as needed for wheezing    Blood Glucose Monitoring Suppl (Accu-Chek Guide) w/Device KIT Use 2 (two) times a day    cholecalciferol (VITAMIN D3) 1,000 units tablet Take 2 tablets (2,000 Units total) by mouth daily    Empagliflozin (Jardiance) 10 MG TABS tablet Take 1 tablet (10 mg total) by mouth every morning    ergocalciferol (VITAMIN D2) 50,000 units take 1 capsule by mouth every week    fluticasone  (FLONASE) 50 mcg/act nasal spray 1 spray into each nostril daily    glucose blood (Accu-Chek Guide) test strip Use as instructed    Januvia 100 MG tablet take 1 tablet by mouth once daily    loratadine (CLARITIN) 10 mg tablet Take 1 tablet (10 mg total) by mouth daily    metFORMIN (GLUCOPHAGE) 1000 MG tablet take 1 tablet by mouth twice a day with meals    minoxidil (ROGAINE) 2 % external solution Apply topically 2 (two) times a day    propranolol (INDERAL) 10 mg tablet     pseudoephedrine-guaifenesin (MUCINEX D)  MG per tablet Take 1 tablet by mouth every 12 (twelve) hours    QUEtiapine (SEROQUEL) 200 mg tablet daily as needed     QUEtiapine (SEROquel) 300 mg tablet Take by mouth daily as needed    rosuvastatin (CRESTOR) 10 MG tablet take 1 tablet by mouth once daily    [DISCONTINUED] leflunomide (ARAVA) 20 MG tablet Take 20 mg by mouth daily    [DISCONTINUED] meloxicam (MOBIC) 15 mg tablet Take 1 tablet (15 mg total) by mouth daily    [DISCONTINUED] Xeljanz XR 11 MG TB24 Take 1 tablet by mouth daily       Objective     /96 (BP Location: Left arm, Patient Position: Sitting, Cuff Size: Standard)   Pulse 96   Temp 98.2 °F (36.8 °C) (Temporal)   Resp 18   Ht 6' (1.829 m)   Wt 78.5 kg (173 lb)   SpO2 99%   BMI 23.46 kg/m²     Physical Exam  Constitutional:       General: He is not in acute distress.     Appearance: Normal appearance. He is well-developed. He is not ill-appearing, toxic-appearing or diaphoretic.   HENT:      Head: Normocephalic and atraumatic.      Right Ear: External ear normal.      Left Ear: External ear normal.      Nose: Nose normal.      Mouth/Throat:      Pharynx: No oropharyngeal exudate.   Eyes:      General: No scleral icterus.        Right eye: No discharge.         Left eye: No discharge.      Conjunctiva/sclera: Conjunctivae normal.      Pupils: Pupils are equal, round, and reactive to light.   Cardiovascular:      Rate and Rhythm: Normal rate and regular rhythm.       Heart sounds: Normal heart sounds. No murmur heard.     No friction rub. No gallop.   Pulmonary:      Effort: Pulmonary effort is normal. No respiratory distress.      Breath sounds: Normal breath sounds. No stridor. No wheezing.   Abdominal:      General: Bowel sounds are normal. There is no distension.      Palpations: Abdomen is soft. There is no mass.      Tenderness: There is no abdominal tenderness. There is no guarding.   Musculoskeletal:         General: Deformity present. Normal range of motion.      Right hand: Deformity, tenderness and bony tenderness present. Normal capillary refill.      Left hand: Deformity, tenderness and bony tenderness present. Normal capillary refill.      Cervical back: Normal range of motion and neck supple.      Right foot: Normal range of motion. Deformity and bunion present.      Left foot: Normal range of motion. Deformity and bunion present.      Comments: Bilateral ulnar deviation  Bilateral swelling and tenderness in the MCP joint   Feet:      Right foot:      Skin integrity: No ulcer or blister.      Left foot:      Skin integrity: No ulcer or blister.   Skin:     General: Skin is warm.      Capillary Refill: Capillary refill takes less than 2 seconds.   Neurological:      Mental Status: He is alert and oriented to person, place, and time.      Cranial Nerves: No cranial nerve deficit.       Thom Vargas MD

## 2024-04-03 NOTE — ASSESSMENT & PLAN NOTE
RA has been flaring up lately  Recommend celecoxib twice daily for 10 to 14 days  Continue Actemra biweekly injections  Recommend outpatient follow-up with rheumatology  Will send to patient for PT/OT for significant ulnar deviation of his bilateral hands

## 2024-04-03 NOTE — ASSESSMENT & PLAN NOTE
Lab Results   Component Value Date    HGBA1C 7.1 (A) 04/03/2024   At goal  Hemoglobin A1c goal of less than 7  Continue Jardiance, Januvia and metformin  Low carbohydrate meal plan discussed with patient  Continue statin

## 2024-05-22 ENCOUNTER — APPOINTMENT (OUTPATIENT)
Dept: LAB | Facility: HOSPITAL | Age: 49
End: 2024-05-22
Payer: MEDICARE

## 2024-05-22 DIAGNOSIS — M05.79 SEROPOSITIVE RHEUMATOID ARTHRITIS OF MULTIPLE SITES (HCC): ICD-10-CM

## 2024-05-22 DIAGNOSIS — Z51.81 ENCOUNTER FOR THERAPEUTIC DRUG MONITORING: ICD-10-CM

## 2024-05-22 LAB
ALBUMIN SERPL BCP-MCNC: 4.3 G/DL (ref 3.5–5)
ALP SERPL-CCNC: 57 U/L (ref 34–104)
ALT SERPL W P-5'-P-CCNC: 24 U/L (ref 7–52)
ANION GAP SERPL CALCULATED.3IONS-SCNC: 9 MMOL/L (ref 4–13)
AST SERPL W P-5'-P-CCNC: 13 U/L (ref 13–39)
BASOPHILS # BLD AUTO: 0.01 THOUSANDS/ÂΜL (ref 0–0.1)
BASOPHILS NFR BLD AUTO: 0 % (ref 0–1)
BILIRUB SERPL-MCNC: 0.73 MG/DL (ref 0.2–1)
BUN SERPL-MCNC: 15 MG/DL (ref 5–25)
CALCIUM SERPL-MCNC: 9.6 MG/DL (ref 8.4–10.2)
CHLORIDE SERPL-SCNC: 102 MMOL/L (ref 96–108)
CHOLEST SERPL-MCNC: 164 MG/DL
CO2 SERPL-SCNC: 25 MMOL/L (ref 21–32)
CREAT SERPL-MCNC: 0.71 MG/DL (ref 0.6–1.3)
CRP SERPL QL: <1 MG/L
EOSINOPHIL # BLD AUTO: 0.07 THOUSAND/ÂΜL (ref 0–0.61)
EOSINOPHIL NFR BLD AUTO: 2 % (ref 0–6)
ERYTHROCYTE [DISTWIDTH] IN BLOOD BY AUTOMATED COUNT: 11.7 % (ref 11.6–15.1)
ERYTHROCYTE [SEDIMENTATION RATE] IN BLOOD: 3 MM/HOUR (ref 0–14)
GFR SERPL CREATININE-BSD FRML MDRD: 112 ML/MIN/1.73SQ M
GLUCOSE P FAST SERPL-MCNC: 182 MG/DL (ref 65–99)
HCT VFR BLD AUTO: 41.7 % (ref 36.5–49.3)
HDLC SERPL-MCNC: 40 MG/DL
HGB BLD-MCNC: 13.8 G/DL (ref 12–17)
IMM GRANULOCYTES # BLD AUTO: 0.02 THOUSAND/UL (ref 0–0.2)
IMM GRANULOCYTES NFR BLD AUTO: 0 % (ref 0–2)
LDLC SERPL CALC-MCNC: 77 MG/DL (ref 0–100)
LYMPHOCYTES # BLD AUTO: 1.42 THOUSANDS/ÂΜL (ref 0.6–4.47)
LYMPHOCYTES NFR BLD AUTO: 30 % (ref 14–44)
MCH RBC QN AUTO: 32.9 PG (ref 26.8–34.3)
MCHC RBC AUTO-ENTMCNC: 33.1 G/DL (ref 31.4–37.4)
MCV RBC AUTO: 100 FL (ref 82–98)
MONOCYTES # BLD AUTO: 0.65 THOUSAND/ÂΜL (ref 0.17–1.22)
MONOCYTES NFR BLD AUTO: 14 % (ref 4–12)
NEUTROPHILS # BLD AUTO: 2.6 THOUSANDS/ÂΜL (ref 1.85–7.62)
NEUTS SEG NFR BLD AUTO: 54 % (ref 43–75)
NONHDLC SERPL-MCNC: 124 MG/DL
NRBC BLD AUTO-RTO: 0 /100 WBCS
PLATELET # BLD AUTO: 234 THOUSANDS/UL (ref 149–390)
PMV BLD AUTO: 10.5 FL (ref 8.9–12.7)
POTASSIUM SERPL-SCNC: 4.3 MMOL/L (ref 3.5–5.3)
PROT SERPL-MCNC: 6.5 G/DL (ref 6.4–8.4)
RBC # BLD AUTO: 4.19 MILLION/UL (ref 3.88–5.62)
SODIUM SERPL-SCNC: 136 MMOL/L (ref 135–147)
TRIGL SERPL-MCNC: 237 MG/DL
WBC # BLD AUTO: 4.77 THOUSAND/UL (ref 4.31–10.16)

## 2024-05-22 PROCEDURE — 36415 COLL VENOUS BLD VENIPUNCTURE: CPT

## 2024-05-22 PROCEDURE — 85025 COMPLETE CBC W/AUTO DIFF WBC: CPT

## 2024-05-22 PROCEDURE — 86140 C-REACTIVE PROTEIN: CPT

## 2024-05-22 PROCEDURE — 80053 COMPREHEN METABOLIC PANEL: CPT

## 2024-05-22 PROCEDURE — 85652 RBC SED RATE AUTOMATED: CPT

## 2024-05-22 PROCEDURE — 80061 LIPID PANEL: CPT

## 2024-06-16 DIAGNOSIS — E11.8 TYPE 2 DIABETES MELLITUS WITH COMPLICATION, WITHOUT LONG-TERM CURRENT USE OF INSULIN (HCC): ICD-10-CM

## 2024-06-25 ENCOUNTER — RA CDI HCC (OUTPATIENT)
Dept: OTHER | Facility: HOSPITAL | Age: 49
End: 2024-06-25

## 2024-06-25 DIAGNOSIS — E11.8 TYPE 2 DIABETES MELLITUS WITH COMPLICATION, WITHOUT LONG-TERM CURRENT USE OF INSULIN (HCC): ICD-10-CM

## 2024-06-25 RX ORDER — EMPAGLIFLOZIN 10 MG/1
10 TABLET, FILM COATED ORAL EVERY MORNING
Qty: 90 TABLET | Refills: 1 | Status: SHIPPED | OUTPATIENT
Start: 2024-06-25

## 2024-06-25 NOTE — PROGRESS NOTES
HCC coding opportunities       Chart Reviewed number of suggestions sent to Provider: 1     Patients Insurance     Medicare Insurance: [employer]          Type 2 diabetes mellitus with mild nonproliferative diabetic retinopathy without macular edema, right eye [E11.9381]     Refer to Ophthalmology  note from 8/22/2023 - please review and assess and document per MEAT if applicable for 2024

## 2024-07-09 ENCOUNTER — OFFICE VISIT (OUTPATIENT)
Dept: FAMILY MEDICINE CLINIC | Facility: CLINIC | Age: 49
End: 2024-07-09

## 2024-07-09 VITALS
WEIGHT: 176.8 LBS | DIASTOLIC BLOOD PRESSURE: 80 MMHG | HEART RATE: 108 BPM | HEIGHT: 72 IN | SYSTOLIC BLOOD PRESSURE: 134 MMHG | RESPIRATION RATE: 18 BRPM | OXYGEN SATURATION: 99 % | BODY MASS INDEX: 23.95 KG/M2 | TEMPERATURE: 98 F

## 2024-07-09 DIAGNOSIS — E78.1 HYPERTRIGLYCERIDEMIA: ICD-10-CM

## 2024-07-09 DIAGNOSIS — M06.9 RHEUMATOID ARTHRITIS, INVOLVING UNSPECIFIED SITE, UNSPECIFIED WHETHER RHEUMATOID FACTOR PRESENT (HCC): ICD-10-CM

## 2024-07-09 DIAGNOSIS — E11.8 TYPE 2 DIABETES MELLITUS WITH COMPLICATION, WITHOUT LONG-TERM CURRENT USE OF INSULIN (HCC): Primary | ICD-10-CM

## 2024-07-09 PROCEDURE — 82043 UR ALBUMIN QUANTITATIVE: CPT | Performed by: FAMILY MEDICINE

## 2024-07-09 PROCEDURE — 99214 OFFICE O/P EST MOD 30 MIN: CPT | Performed by: FAMILY MEDICINE

## 2024-07-09 PROCEDURE — G2211 COMPLEX E/M VISIT ADD ON: HCPCS | Performed by: FAMILY MEDICINE

## 2024-07-09 PROCEDURE — 82570 ASSAY OF URINE CREATININE: CPT | Performed by: FAMILY MEDICINE

## 2024-07-09 NOTE — ASSESSMENT & PLAN NOTE
Intermittent flareup of RA involving multiple joints  Recommend celecoxib twice daily as needed  Continue Actemra weekly injections  Recommend outpatient follow-up with rheumatology

## 2024-07-09 NOTE — ASSESSMENT & PLAN NOTE
Reviewed labs with patient  Most recent triglyceride 237  LDL less than 70  Recommend low-fat diet  Continue statin

## 2024-07-09 NOTE — ASSESSMENT & PLAN NOTE
Lab Results   Component Value Date    HGBA1C 7.1 (A) 04/03/2024   Stable  Continue Jardiance, Januvia and metformin  Continue low carbohydrate diet  Continue statin

## 2024-07-09 NOTE — PROGRESS NOTES
Ambulatory Visit  Name: Jamie Mujica Jr.      : 1975      MRN: [id number]  Encounter Provider: Thom Vargas MD  Encounter Date: 2024   Encounter department: [facility]    Assessment & Plan   1. Type 2 diabetes mellitus with complication, without long-term current use of insulin (HCC)  Assessment & Plan:    Lab Results   Component Value Date    HGBA1C 7.1 (A) 2024   Stable  Continue Jardiance, Januvia and metformin  Continue low carbohydrate diet  Continue statin  Orders:  -     Albumin / creatinine urine ratio; Future  2. Rheumatoid arthritis, involving unspecified site, unspecified whether rheumatoid factor present (HCC)  Assessment & Plan:  Intermittent flareup of RA involving multiple joints  Recommend celecoxib twice daily as needed  Continue Actemra weekly injections  Recommend outpatient follow-up with rheumatology          3. Hypertriglyceridemia  Assessment & Plan:  Reviewed labs with patient  Most recent triglyceride 237  LDL less than 70  Recommend low-fat diet  Continue statin       History of Present Illness     48-year-old male with a history of rheumatoid arthritis, hypertriglyceridemia, and type 2 diabetes who presents today for follow-up.  His main concern is diffuse joint pain involving his ankles.  This is from his RA.  He recently had medication adjustment for his RA by his rheumatologist.  Patient reports that he is compliant with his diabetic medication.  He is also following a low carbohydrate diet.  He has not been exercising much lately due to his joint pain.        Review of Systems   Constitutional:  Negative for chills, diaphoresis, fatigue and fever.   Respiratory:  Negative for shortness of breath and wheezing.    Cardiovascular:  Negative for chest pain and palpitations.   Gastrointestinal:  Negative for abdominal pain, nausea and vomiting.   Musculoskeletal:  Positive for arthralgias.   Skin:  Negative for rash.    Neurological:  Negative for syncope, facial asymmetry and numbness.   Psychiatric/Behavioral:  Negative for confusion.        Objective     /80 (BP Location: Left arm, Patient Position: Sitting, Cuff Size: Standard)   Pulse (!) 108   Temp 98 °F (36.7 °C) (Temporal)   Resp 18   Ht 6' (1.829 m)   Wt 80.2 kg (176 lb 12.8 oz)   SpO2 99%   BMI 23.98 kg/m²     Physical Exam  Vitals reviewed.   Constitutional:       General: He is not in acute distress.     Appearance: Normal appearance. He is well-developed. He is not ill-appearing, toxic-appearing or diaphoretic.   HENT:      Head: Normocephalic and atraumatic.      Right Ear: External ear normal.      Left Ear: External ear normal.      Nose: No congestion or rhinorrhea.      Mouth/Throat:      Mouth: Mucous membranes are moist.   Eyes:      General: No scleral icterus.        Right eye: No discharge.         Left eye: No discharge.      Extraocular Movements: Extraocular movements intact.      Conjunctiva/sclera: Conjunctivae normal.      Pupils: Pupils are equal, round, and reactive to light.   Cardiovascular:      Rate and Rhythm: Normal rate and regular rhythm.      Heart sounds: Normal heart sounds. No murmur heard.     No friction rub. No gallop.   Pulmonary:      Effort: Pulmonary effort is normal. No respiratory distress.      Breath sounds: Normal breath sounds. No stridor. No wheezing or rhonchi.   Abdominal:      General: Bowel sounds are normal. There is no distension.      Palpations: Abdomen is soft.      Tenderness: There is no abdominal tenderness. There is no guarding.   Musculoskeletal:         General: Normal range of motion.      Cervical back: Normal range of motion.      Right ankle: No swelling. No tenderness. No lateral malleolus tenderness. Normal range of motion.      Left ankle: Swelling present. Tenderness present over the lateral malleolus. Normal range of motion.   Skin:     General: Skin is warm.      Capillary Refill:  Capillary refill takes less than 2 seconds.   Neurological:      General: No focal deficit present.      Mental Status: He is alert and oriented to person, place, and time.      Motor: No weakness.      Gait: Gait normal.   Psychiatric:         Mood and Affect: Mood normal.         Behavior: Behavior normal.       Administrative Statements

## 2024-07-11 LAB
CREAT UR-MCNC: 191.1 MG/DL
MICROALBUMIN UR-MCNC: 18.1 MG/L
MICROALBUMIN/CREAT 24H UR: 9 MG/G CREATININE (ref 0–30)

## 2024-09-17 ENCOUNTER — HOSPITAL ENCOUNTER (EMERGENCY)
Facility: HOSPITAL | Age: 49
Discharge: HOME | End: 2024-09-17
Attending: EMERGENCY MEDICINE
Payer: MEDICARE

## 2024-09-17 ENCOUNTER — APPOINTMENT (EMERGENCY)
Dept: RADIOLOGY | Facility: HOSPITAL | Age: 49
End: 2024-09-17
Payer: MEDICARE

## 2024-09-17 ENCOUNTER — APPOINTMENT (OUTPATIENT)
Dept: LAB | Facility: HOSPITAL | Age: 49
End: 2024-09-17
Payer: MEDICARE

## 2024-09-17 VITALS
HEART RATE: 75 BPM | TEMPERATURE: 98.2 F | RESPIRATION RATE: 20 BRPM | WEIGHT: 175.49 LBS | OXYGEN SATURATION: 99 % | DIASTOLIC BLOOD PRESSURE: 98 MMHG | BODY MASS INDEX: 23.8 KG/M2 | SYSTOLIC BLOOD PRESSURE: 155 MMHG

## 2024-09-17 DIAGNOSIS — Z51.81 ENCOUNTER FOR THERAPEUTIC DRUG MONITORING: ICD-10-CM

## 2024-09-17 DIAGNOSIS — R07.89 ATYPICAL CHEST PAIN: Primary | ICD-10-CM

## 2024-09-17 DIAGNOSIS — M05.79 SEROPOSITIVE RHEUMATOID ARTHRITIS OF MULTIPLE SITES (HCC): ICD-10-CM

## 2024-09-17 LAB
ALBUMIN SERPL BCG-MCNC: 4.4 G/DL (ref 3.5–5)
ALBUMIN SERPL BCG-MCNC: 4.6 G/DL (ref 3.5–5)
ALP SERPL-CCNC: 55 U/L (ref 34–104)
ALP SERPL-CCNC: 56 U/L (ref 34–104)
ALT SERPL W P-5'-P-CCNC: 24 U/L (ref 7–52)
ALT SERPL W P-5'-P-CCNC: 25 U/L (ref 7–52)
ANION GAP SERPL CALCULATED.3IONS-SCNC: 12 MMOL/L (ref 4–13)
ANION GAP SERPL CALCULATED.3IONS-SCNC: 8 MMOL/L (ref 4–13)
AST SERPL W P-5'-P-CCNC: 15 U/L (ref 13–39)
AST SERPL W P-5'-P-CCNC: 18 U/L (ref 13–39)
BASOPHILS # BLD AUTO: 0.03 THOUSANDS/ΜL (ref 0–0.1)
BASOPHILS # BLD AUTO: 0.03 THOUSANDS/ΜL (ref 0–0.1)
BASOPHILS NFR BLD AUTO: 1 % (ref 0–1)
BASOPHILS NFR BLD AUTO: 1 % (ref 0–1)
BILIRUB SERPL-MCNC: 0.54 MG/DL (ref 0.2–1)
BILIRUB SERPL-MCNC: 0.6 MG/DL (ref 0.2–1)
BUN SERPL-MCNC: 15 MG/DL (ref 5–25)
BUN SERPL-MCNC: 16 MG/DL (ref 5–25)
CALCIUM SERPL-MCNC: 9.3 MG/DL (ref 8.4–10.2)
CALCIUM SERPL-MCNC: 9.4 MG/DL (ref 8.4–10.2)
CARDIAC TROPONIN I PNL SERPL HS: 4 NG/L
CHLORIDE SERPL-SCNC: 101 MMOL/L (ref 96–108)
CHLORIDE SERPL-SCNC: 99 MMOL/L (ref 96–108)
CO2 SERPL-SCNC: 23 MMOL/L (ref 21–32)
CO2 SERPL-SCNC: 26 MMOL/L (ref 21–32)
CREAT SERPL-MCNC: 0.7 MG/DL (ref 0.6–1.3)
CREAT SERPL-MCNC: 0.73 MG/DL (ref 0.6–1.3)
CRP SERPL QL: <1 MG/L
EOSINOPHIL # BLD AUTO: 0.07 THOUSAND/ΜL (ref 0–0.61)
EOSINOPHIL # BLD AUTO: 0.09 THOUSAND/ΜL (ref 0–0.61)
EOSINOPHIL NFR BLD AUTO: 1 % (ref 0–6)
EOSINOPHIL NFR BLD AUTO: 2 % (ref 0–6)
ERYTHROCYTE [DISTWIDTH] IN BLOOD BY AUTOMATED COUNT: 11.4 % (ref 11.6–15.1)
ERYTHROCYTE [DISTWIDTH] IN BLOOD BY AUTOMATED COUNT: 11.5 % (ref 11.6–15.1)
ERYTHROCYTE [SEDIMENTATION RATE] IN BLOOD: 2 MM/HOUR (ref 0–14)
GFR SERPL CREATININE-BSD FRML MDRD: 111 ML/MIN/1.73SQ M
GFR SERPL CREATININE-BSD FRML MDRD: 113 ML/MIN/1.73SQ M
GLUCOSE P FAST SERPL-MCNC: 124 MG/DL (ref 65–99)
GLUCOSE SERPL-MCNC: 208 MG/DL (ref 65–140)
HCT VFR BLD AUTO: 41.1 % (ref 36.5–49.3)
HCT VFR BLD AUTO: 41.3 % (ref 36.5–49.3)
HGB BLD-MCNC: 14.5 G/DL (ref 12–17)
HGB BLD-MCNC: 14.5 G/DL (ref 12–17)
IMM GRANULOCYTES # BLD AUTO: 0.03 THOUSAND/UL (ref 0–0.2)
IMM GRANULOCYTES # BLD AUTO: 0.05 THOUSAND/UL (ref 0–0.2)
IMM GRANULOCYTES NFR BLD AUTO: 1 % (ref 0–2)
IMM GRANULOCYTES NFR BLD AUTO: 1 % (ref 0–2)
LYMPHOCYTES # BLD AUTO: 1.57 THOUSANDS/ΜL (ref 0.6–4.47)
LYMPHOCYTES # BLD AUTO: 1.74 THOUSANDS/ΜL (ref 0.6–4.47)
LYMPHOCYTES NFR BLD AUTO: 28 % (ref 14–44)
LYMPHOCYTES NFR BLD AUTO: 29 % (ref 14–44)
MCH RBC QN AUTO: 34.6 PG (ref 26.8–34.3)
MCH RBC QN AUTO: 34.8 PG (ref 26.8–34.3)
MCHC RBC AUTO-ENTMCNC: 35.1 G/DL (ref 31.4–37.4)
MCHC RBC AUTO-ENTMCNC: 35.3 G/DL (ref 31.4–37.4)
MCV RBC AUTO: 99 FL (ref 82–98)
MCV RBC AUTO: 99 FL (ref 82–98)
MONOCYTES # BLD AUTO: 0.63 THOUSAND/ΜL (ref 0.17–1.22)
MONOCYTES # BLD AUTO: 0.71 THOUSAND/ΜL (ref 0.17–1.22)
MONOCYTES NFR BLD AUTO: 11 % (ref 4–12)
MONOCYTES NFR BLD AUTO: 12 % (ref 4–12)
NEUTROPHILS # BLD AUTO: 3.27 THOUSANDS/ΜL (ref 1.85–7.62)
NEUTROPHILS # BLD AUTO: 3.44 THOUSANDS/ΜL (ref 1.85–7.62)
NEUTS SEG NFR BLD AUTO: 56 % (ref 43–75)
NEUTS SEG NFR BLD AUTO: 57 % (ref 43–75)
NRBC BLD AUTO-RTO: 0 /100 WBCS
NRBC BLD AUTO-RTO: 0 /100 WBCS
PLATELET # BLD AUTO: 249 THOUSANDS/UL (ref 149–390)
PLATELET # BLD AUTO: 254 THOUSANDS/UL (ref 149–390)
PMV BLD AUTO: 10.2 FL (ref 8.9–12.7)
PMV BLD AUTO: 10.3 FL (ref 8.9–12.7)
POTASSIUM SERPL-SCNC: 3.7 MMOL/L (ref 3.5–5.3)
POTASSIUM SERPL-SCNC: 3.8 MMOL/L (ref 3.5–5.3)
PROT SERPL-MCNC: 6.6 G/DL (ref 6.4–8.4)
PROT SERPL-MCNC: 6.9 G/DL (ref 6.4–8.4)
RBC # BLD AUTO: 4.17 MILLION/UL (ref 3.88–5.62)
RBC # BLD AUTO: 4.19 MILLION/UL (ref 3.88–5.62)
SODIUM SERPL-SCNC: 134 MMOL/L (ref 135–147)
SODIUM SERPL-SCNC: 135 MMOL/L (ref 135–147)
WBC # BLD AUTO: 5.64 THOUSAND/UL (ref 4.31–10.16)
WBC # BLD AUTO: 6.02 THOUSAND/UL (ref 4.31–10.16)

## 2024-09-17 PROCEDURE — 86140 C-REACTIVE PROTEIN: CPT

## 2024-09-17 PROCEDURE — 71046 X-RAY EXAM CHEST 2 VIEWS: CPT

## 2024-09-17 PROCEDURE — 36415 COLL VENOUS BLD VENIPUNCTURE: CPT

## 2024-09-17 PROCEDURE — 80053 COMPREHEN METABOLIC PANEL: CPT

## 2024-09-17 PROCEDURE — 85025 COMPLETE CBC W/AUTO DIFF WBC: CPT

## 2024-09-17 PROCEDURE — 84484 ASSAY OF TROPONIN QUANT: CPT

## 2024-09-17 PROCEDURE — 85652 RBC SED RATE AUTOMATED: CPT

## 2024-09-17 PROCEDURE — 99285 EMERGENCY DEPT VISIT HI MDM: CPT

## 2024-09-17 PROCEDURE — 93005 ELECTROCARDIOGRAM TRACING: CPT

## 2024-09-17 RX ORDER — SODIUM CHLORIDE 9 MG/ML
3 INJECTION INTRAVENOUS
Status: DISCONTINUED | OUTPATIENT
Start: 2024-09-17 | End: 2024-09-17 | Stop reason: HOSPADM

## 2024-09-18 ENCOUNTER — HOSPITAL ENCOUNTER (EMERGENCY)
Facility: HOSPITAL | Age: 49
Discharge: HOME | End: 2024-09-18
Attending: EMERGENCY MEDICINE | Admitting: EMERGENCY MEDICINE
Payer: MEDICARE

## 2024-09-18 ENCOUNTER — OFFICE VISIT (OUTPATIENT)
Dept: FAMILY MEDICINE CLINIC | Facility: CLINIC | Age: 49
End: 2024-09-18

## 2024-09-18 ENCOUNTER — APPOINTMENT (EMERGENCY)
Dept: CT IMAGING | Facility: HOSPITAL | Age: 49
End: 2024-09-18
Payer: MEDICARE

## 2024-09-18 VITALS
BODY MASS INDEX: 23.83 KG/M2 | HEART RATE: 83 BPM | OXYGEN SATURATION: 97 % | TEMPERATURE: 98 F | DIASTOLIC BLOOD PRESSURE: 82 MMHG | HEIGHT: 72 IN | RESPIRATION RATE: 18 BRPM | SYSTOLIC BLOOD PRESSURE: 146 MMHG | WEIGHT: 175.9 LBS

## 2024-09-18 VITALS
SYSTOLIC BLOOD PRESSURE: 159 MMHG | HEART RATE: 80 BPM | DIASTOLIC BLOOD PRESSURE: 111 MMHG | TEMPERATURE: 98.9 F | OXYGEN SATURATION: 98 % | RESPIRATION RATE: 16 BRPM

## 2024-09-18 DIAGNOSIS — F41.0 PANIC ATTACKS: Primary | ICD-10-CM

## 2024-09-18 DIAGNOSIS — F41.0 PANIC ATTACK: ICD-10-CM

## 2024-09-18 DIAGNOSIS — R07.89 ATYPICAL CHEST PAIN: Primary | ICD-10-CM

## 2024-09-18 DIAGNOSIS — Z72.89 CURRENT VAPING ON SOME DAYS: ICD-10-CM

## 2024-09-18 LAB
ALBUMIN SERPL BCG-MCNC: 4.4 G/DL (ref 3.5–5)
ALP SERPL-CCNC: 51 U/L (ref 34–104)
ALT SERPL W P-5'-P-CCNC: 25 U/L (ref 7–52)
ANION GAP SERPL CALCULATED.3IONS-SCNC: 8 MMOL/L (ref 4–13)
AST SERPL W P-5'-P-CCNC: 24 U/L (ref 13–39)
ATRIAL RATE: 81 BPM
ATRIAL RATE: 87 BPM
BASOPHILS # BLD AUTO: 0.02 THOUSANDS/ΜL (ref 0–0.1)
BASOPHILS NFR BLD AUTO: 0 % (ref 0–1)
BILIRUB SERPL-MCNC: 0.73 MG/DL (ref 0.2–1)
BNP SERPL-MCNC: 19 PG/ML (ref 0–100)
BUN SERPL-MCNC: 12 MG/DL (ref 5–25)
CALCIUM SERPL-MCNC: 8.9 MG/DL (ref 8.4–10.2)
CARDIAC TROPONIN I PNL SERPL HS: 3 NG/L
CHLORIDE SERPL-SCNC: 102 MMOL/L (ref 96–108)
CO2 SERPL-SCNC: 24 MMOL/L (ref 21–32)
CREAT SERPL-MCNC: 0.64 MG/DL (ref 0.6–1.3)
D DIMER PPP FEU-MCNC: 6.73 UG/ML FEU
EOSINOPHIL # BLD AUTO: 0.08 THOUSAND/ΜL (ref 0–0.61)
EOSINOPHIL NFR BLD AUTO: 2 % (ref 0–6)
ERYTHROCYTE [DISTWIDTH] IN BLOOD BY AUTOMATED COUNT: 11.6 % (ref 11.6–15.1)
GFR SERPL CREATININE-BSD FRML MDRD: 117 ML/MIN/1.73SQ M
GLUCOSE SERPL-MCNC: 188 MG/DL (ref 65–140)
HCT VFR BLD AUTO: 40.1 % (ref 36.5–49.3)
HGB BLD-MCNC: 14 G/DL (ref 12–17)
IMM GRANULOCYTES # BLD AUTO: 0.03 THOUSAND/UL (ref 0–0.2)
IMM GRANULOCYTES NFR BLD AUTO: 1 % (ref 0–2)
LYMPHOCYTES # BLD AUTO: 1.48 THOUSANDS/ΜL (ref 0.6–4.47)
LYMPHOCYTES NFR BLD AUTO: 30 % (ref 14–44)
MAGNESIUM SERPL-MCNC: 1.7 MG/DL (ref 1.9–2.7)
MCH RBC QN AUTO: 34.5 PG (ref 26.8–34.3)
MCHC RBC AUTO-ENTMCNC: 34.9 G/DL (ref 31.4–37.4)
MCV RBC AUTO: 99 FL (ref 82–98)
MONOCYTES # BLD AUTO: 0.6 THOUSAND/ΜL (ref 0.17–1.22)
MONOCYTES NFR BLD AUTO: 12 % (ref 4–12)
NEUTROPHILS # BLD AUTO: 2.73 THOUSANDS/ΜL (ref 1.85–7.62)
NEUTS SEG NFR BLD AUTO: 55 % (ref 43–75)
NRBC BLD AUTO-RTO: 0 /100 WBCS
P AXIS: 46 DEGREES
P AXIS: 49 DEGREES
PLATELET # BLD AUTO: 253 THOUSANDS/UL (ref 149–390)
PMV BLD AUTO: 10.2 FL (ref 8.9–12.7)
POTASSIUM SERPL-SCNC: 4.4 MMOL/L (ref 3.5–5.3)
PR INTERVAL: 154 MS
PR INTERVAL: 158 MS
PROT SERPL-MCNC: 6.8 G/DL (ref 6.4–8.4)
QRS AXIS: 33 DEGREES
QRS AXIS: 38 DEGREES
QRSD INTERVAL: 88 MS
QRSD INTERVAL: 92 MS
QT INTERVAL: 354 MS
QT INTERVAL: 362 MS
QTC INTERVAL: 420 MS
QTC INTERVAL: 425 MS
RBC # BLD AUTO: 4.06 MILLION/UL (ref 3.88–5.62)
SODIUM SERPL-SCNC: 134 MMOL/L (ref 135–147)
T WAVE AXIS: 31 DEGREES
T WAVE AXIS: 37 DEGREES
VENTRICULAR RATE: 81 BPM
VENTRICULAR RATE: 87 BPM
WBC # BLD AUTO: 4.94 THOUSAND/UL (ref 4.31–10.16)

## 2024-09-18 PROCEDURE — 80053 COMPREHEN METABOLIC PANEL: CPT | Performed by: EMERGENCY MEDICINE

## 2024-09-18 PROCEDURE — 93010 ELECTROCARDIOGRAM REPORT: CPT

## 2024-09-18 PROCEDURE — 36415 COLL VENOUS BLD VENIPUNCTURE: CPT | Performed by: EMERGENCY MEDICINE

## 2024-09-18 PROCEDURE — 71275 CT ANGIOGRAPHY CHEST: CPT

## 2024-09-18 PROCEDURE — 83880 ASSAY OF NATRIURETIC PEPTIDE: CPT | Performed by: EMERGENCY MEDICINE

## 2024-09-18 PROCEDURE — 99285 EMERGENCY DEPT VISIT HI MDM: CPT | Performed by: EMERGENCY MEDICINE

## 2024-09-18 PROCEDURE — 85379 FIBRIN DEGRADATION QUANT: CPT | Performed by: EMERGENCY MEDICINE

## 2024-09-18 PROCEDURE — 83735 ASSAY OF MAGNESIUM: CPT | Performed by: EMERGENCY MEDICINE

## 2024-09-18 PROCEDURE — 85025 COMPLETE CBC W/AUTO DIFF WBC: CPT | Performed by: EMERGENCY MEDICINE

## 2024-09-18 PROCEDURE — 96374 THER/PROPH/DIAG INJ IV PUSH: CPT

## 2024-09-18 PROCEDURE — 96361 HYDRATE IV INFUSION ADD-ON: CPT

## 2024-09-18 PROCEDURE — 93010 ELECTROCARDIOGRAM REPORT: CPT | Performed by: INTERNAL MEDICINE

## 2024-09-18 PROCEDURE — 84484 ASSAY OF TROPONIN QUANT: CPT | Performed by: EMERGENCY MEDICINE

## 2024-09-18 PROCEDURE — 99214 OFFICE O/P EST MOD 30 MIN: CPT | Performed by: FAMILY MEDICINE

## 2024-09-18 PROCEDURE — 93005 ELECTROCARDIOGRAM TRACING: CPT

## 2024-09-18 PROCEDURE — 99285 EMERGENCY DEPT VISIT HI MDM: CPT

## 2024-09-18 PROCEDURE — 93000 ELECTROCARDIOGRAM COMPLETE: CPT | Performed by: FAMILY MEDICINE

## 2024-09-18 RX ORDER — HYDROXYZINE HYDROCHLORIDE 50 MG/1
50 TABLET, FILM COATED ORAL 3 TIMES DAILY PRN
Qty: 30 TABLET | Refills: 0 | Status: SHIPPED | OUTPATIENT
Start: 2024-09-18 | End: 2024-09-28

## 2024-09-18 RX ORDER — PROPRANOLOL HCL 10 MG
10 TABLET ORAL 3 TIMES DAILY PRN
Qty: 30 TABLET | Refills: 1 | Status: CANCELLED | OUTPATIENT
Start: 2024-09-18

## 2024-09-18 RX ORDER — ESCITALOPRAM OXALATE 10 MG/1
10 TABLET ORAL DAILY
Qty: 30 TABLET | Refills: 1 | Status: SHIPPED | OUTPATIENT
Start: 2024-09-18

## 2024-09-18 RX ORDER — LORAZEPAM 2 MG/ML
2 INJECTION INTRAMUSCULAR ONCE
Status: COMPLETED | OUTPATIENT
Start: 2024-09-18 | End: 2024-09-18

## 2024-09-18 RX ORDER — ALBUTEROL SULFATE 90 UG/1
2 INHALANT RESPIRATORY (INHALATION) EVERY 6 HOURS PRN
Qty: 18 G | Refills: 1 | Status: SHIPPED | OUTPATIENT
Start: 2024-09-18

## 2024-09-18 RX ADMIN — IOHEXOL 100 ML: 350 INJECTION, SOLUTION INTRAVENOUS at 18:42

## 2024-09-18 RX ADMIN — LORAZEPAM 2 MG: 2 INJECTION INTRAMUSCULAR; INTRAVENOUS at 17:20

## 2024-09-18 RX ADMIN — SODIUM CHLORIDE 1000 ML: 0.9 INJECTION, SOLUTION INTRAVENOUS at 17:20

## 2024-09-18 NOTE — ED PROVIDER NOTES
1. Panic attacks      ED Disposition       ED Disposition   Discharge    Condition   Stable    Date/Time   Wed Sep 18, 2024  7:51 PM    Comment   Jamie Mujica Jr. discharge to home/self care.                   Assessment & Plan       Medical Decision Making  48-year-old male presenting to the emerged department with chest pain that is right-sided along with shortness of breath.  Differential diagnose includes PE, pneumonia, pneumothorax, ACS.  Low risk for ACS by heart score, D-dimer elevated.  PE study negative.  Patient likely having panic attacks as patient appears quite anxious when these come back.  Has underlying anxiety which is untreated.    Amount and/or Complexity of Data Reviewed  Labs: ordered.  Radiology: ordered.    Risk  Prescription drug management.          HEART Risk Score      Flowsheet Row Most Recent Value   Heart Score Risk Calculator    History 0 Filed at: 09/18/2024 2132   ECG 0 Filed at: 09/18/2024 2132   Age 1 Filed at: 09/18/2024 2132   Risk Factors 1 Filed at: 09/18/2024 2132   Troponin 0 Filed at: 09/18/2024 2132   HEART Score 2 Filed at: 09/18/2024 2132                    Medications   sodium chloride 0.9 % bolus 1,000 mL (0 mL Intravenous Stopped 9/18/24 2004)   LORazepam (ATIVAN) injection 2 mg (2 mg Intravenous Given 9/18/24 1720)   iohexol (OMNIPAQUE) 350 MG/ML injection (MULTI-DOSE) 100 mL (100 mL Intravenous Given 9/18/24 1842)       History of Present Illness       48-year-old male presenting emerged department with chest pain shortness of breath feeling of impending doom happening multiple times a day over the last 2 days.  Was seen in the ED yesterday as well as seen by PCP today.  Likely anxiety related, has underlying anxiety which is untreated.            Review of Systems   Constitutional:  Negative for chills and fever.   HENT:  Negative for ear pain and sore throat.    Eyes:  Negative for pain and visual disturbance.   Respiratory:  Positive for shortness of breath.  Negative for cough.    Cardiovascular:  Positive for chest pain. Negative for palpitations.   Gastrointestinal:  Negative for abdominal pain and vomiting.   Genitourinary:  Negative for dysuria and hematuria.   Musculoskeletal:  Negative for arthralgias and back pain.   Skin:  Negative for color change and rash.   Neurological:  Negative for seizures and syncope.   Psychiatric/Behavioral:  The patient is nervous/anxious.    All other systems reviewed and are negative.          Objective     ED Triage Vitals   Temperature Pulse Blood Pressure Respirations SpO2 Patient Position - Orthostatic VS   09/18/24 1639 09/18/24 1637 09/18/24 1637 09/18/24 1637 09/18/24 1637 09/18/24 1637   98.9 °F (37.2 °C) 80 (!) 159/111 16 98 % Lying      Temp src Heart Rate Source BP Location FiO2 (%) Pain Score    -- -- -- -- --                Physical Exam  Vitals and nursing note reviewed.   Constitutional:       General: He is not in acute distress.     Appearance: He is well-developed.   HENT:      Head: Normocephalic and atraumatic.   Eyes:      Conjunctiva/sclera: Conjunctivae normal.   Cardiovascular:      Rate and Rhythm: Normal rate and regular rhythm.      Heart sounds: No murmur heard.  Pulmonary:      Effort: Pulmonary effort is normal. No respiratory distress.      Breath sounds: Normal breath sounds.   Abdominal:      Palpations: Abdomen is soft.      Tenderness: There is no abdominal tenderness.   Musculoskeletal:         General: No swelling.      Cervical back: Neck supple.   Skin:     General: Skin is warm and dry.      Capillary Refill: Capillary refill takes less than 2 seconds.   Neurological:      Mental Status: He is alert.   Psychiatric:         Mood and Affect: Mood normal.         Labs Reviewed   CBC AND DIFFERENTIAL - Abnormal       Result Value    WBC 4.94      RBC 4.06      Hemoglobin 14.0      Hematocrit 40.1      MCV 99 (*)     MCH 34.5 (*)     MCHC 34.9      RDW 11.6      MPV 10.2      Platelets 253       nRBC 0      Segmented % 55      Immature Grans % 1      Lymphocytes % 30      Monocytes % 12      Eosinophils Relative 2      Basophils Relative 0      Absolute Neutrophils 2.73      Absolute Immature Grans 0.03      Absolute Lymphocytes 1.48      Absolute Monocytes 0.60      Eosinophils Absolute 0.08      Basophils Absolute 0.02     COMPREHENSIVE METABOLIC PANEL - Abnormal    Sodium 134 (*)     Potassium 4.4      Chloride 102      CO2 24      ANION GAP 8      BUN 12      Creatinine 0.64      Glucose 188 (*)     Calcium 8.9      AST 24      ALT 25      Alkaline Phosphatase 51      Total Protein 6.8      Albumin 4.4      Total Bilirubin 0.73      eGFR 117      Narrative:     National Kidney Disease Foundation guidelines for Chronic Kidney Disease (CKD):     Stage 1 with normal or high GFR (GFR > 90 mL/min/1.73 square meters)    Stage 2 Mild CKD (GFR = 60-89 mL/min/1.73 square meters)    Stage 3A Moderate CKD (GFR = 45-59 mL/min/1.73 square meters)    Stage 3B Moderate CKD (GFR = 30-44 mL/min/1.73 square meters)    Stage 4 Severe CKD (GFR = 15-29 mL/min/1.73 square meters)    Stage 5 End Stage CKD (GFR <15 mL/min/1.73 square meters)  Note: GFR calculation is accurate only with a steady state creatinine   MAGNESIUM - Abnormal    Magnesium 1.7 (*)    D-DIMER, QUANTITATIVE - Abnormal    D-Dimer, Quant 6.73 (*)    B-TYPE NATRIURETIC PEPTIDE (BNP) - Normal    BNP 19     HS TROPONIN I 0HR - Normal    hs TnI 0hr 3       CTA ED chest PE Study   Final Interpretation by Margareth Vázquez MD (09/18 1949)      No definite acute pulmonary emboli but study is limited, particularly in the lower lobes, due to moderate motion artifact.      No acute pulmonary disease.               Workstation performed: [id number]             Procedures       Perry Harden MD  09/18/24 [id number]

## 2024-09-18 NOTE — ED PROVIDER NOTES
"1. Atypical chest pain      ED Disposition       ED Disposition   Discharge    Condition   Stable    Date/Time   Tue Sep 17, 2024  9:00 PM    Comment   Jamie Mujica Jr. discharge to home/self care.                   Assessment & Plan       Medical Decision Making  Background: 48 y.o. male with chest pain- intermittent x 3 weeks described as tightness/cramping. No radiation. Not related to exertion. Reports vaping. Denies SOB.     Differential DX includes but is not limited to: acs/mi, pe, pleurisy, dissection, pneumonia, musculoskeletal chest pain, anxiety     Plan: cardiac workup    PERC negative, further workup PE not indicated. Ecg without acute ischemic changes or dysrhythmia. CXR without acute cardiopulmonary disease on wet read. Heart score 4, cardiology referral placed. Asymptomatic at this time.     All imaging and/or lab testing discussed with patient, strict return to ED precautions discussed. Patient recommended to follow up promptly with appropriate outpatient provider and risk of morbidity/mortality if patient does not follow up as recommended was discussed. Patient and/or family members verbalizes understanding and agrees with plan. Patient and/or family members were given opportunity to ask questions, all questions were answered at this time. Patient is stable for discharge.     Portions of the record may have been created with voice recognition software. Occasional wrong word or \"sound a like\" substitutions may have occurred due to the inherent limitations of voice recognition software. Read the chart carefully and recognize, using context, where substitutions have occurred.         Amount and/or Complexity of Data Reviewed  Labs: ordered. Decision-making details documented in ED Course.  Radiology: ordered and independent interpretation performed.    Risk  Prescription drug management.          HEART Risk Score      Flowsheet Row Most Recent Value   Heart Score Risk Calculator    History 1 Filed at: " 09/17/2024 2045   ECG 0 Filed at: 09/17/2024 2045   Age 1 Filed at: 09/17/2024 2045   Risk Factors 2 Filed at: 09/17/2024 2045   Troponin 0 Filed at: 09/17/2024 2045   HEART Score 4 Filed at: 09/17/2024 2045               ED Course as of 09/18/24 1641   Tue Sep 17, 2024   1816 Intermittent chest tightness/ cramping pain x 3 weeks worse with vaping.    1908 hs TnI 0hr: 4   1916 Imaging review done by myself and preliminary results were discussed with the patient and family members.  Discussion was had with patient and family members that this read is preliminary and therefore discrepancies between this read and the final read by the radiologist are possible.  Patient and family members were informed that any discrepancies found by would be relayed by phone call.        Medications - No data to display      History of Present Illness       48 old male past medical history of RA, hyperlipidemia, DM2 presents to emergency department complaining of intermittent chest tightness/ cramping pain x 3 weeks worse with vaping.       History provided by:  Patient  Chest Pain  Associated symptoms: anxiety    Associated symptoms: no abdominal pain, no altered mental status, no anorexia, no back pain, no cough, no diaphoresis, no dizziness, no fever, no headache, no lower extremity edema, no nausea, no numbness, no palpitations, no shortness of breath, no syncope, not vomiting and no weakness            Review of Systems   Constitutional:  Negative for diaphoresis and fever.   HENT:  Negative for sore throat.    Respiratory:  Negative for cough and shortness of breath.    Cardiovascular:  Positive for chest pain. Negative for palpitations, leg swelling and syncope.   Gastrointestinal:  Negative for abdominal pain, anorexia, nausea and vomiting.   Musculoskeletal:  Negative for back pain and myalgias.   Skin:  Negative for color change, rash and wound.   Neurological:  Negative for dizziness, syncope, weakness, numbness and  headaches.   All other systems reviewed and are negative.          Objective     ED Triage Vitals   Temperature Pulse Blood Pressure Respirations SpO2 Patient Position - Orthostatic VS   09/17/24 1814 09/17/24 1812 09/17/24 1812 09/17/24 1812 09/17/24 1812 09/17/24 2107   98.2 °F (36.8 °C) 95 167/84 20 99 % Lying      Temp Source Heart Rate Source BP Location FiO2 (%) Pain Score    09/17/24 1814 09/17/24 1812 09/17/24 2107 -- --    Oral Monitor Left arm          Physical Exam  Vitals and nursing note reviewed.   Constitutional:       General: He is awake. He is not in acute distress.     Appearance: Normal appearance. He is not ill-appearing, toxic-appearing or diaphoretic.   HENT:      Head: Normocephalic.      Mouth/Throat:      Lips: Pink.      Mouth: Mucous membranes are moist.   Eyes:      General: Vision grossly intact.   Cardiovascular:      Rate and Rhythm: Normal rate and regular rhythm.      Pulses:           Radial pulses are 2+ on the right side and 2+ on the left side.      Heart sounds: Normal heart sounds. No murmur heard.  Pulmonary:      Effort: Pulmonary effort is normal. No respiratory distress.      Breath sounds: Normal breath sounds.      Comments: Patient in no respiratory distress, speaking in full sentences, managing oral secretions without difficulty, no accessory muscle use, retractions, or belly breathing noted, no adventitious lung sounds auscultated bilaterally.  Chest:      Chest wall: No tenderness, crepitus or edema.   Abdominal:      General: There is no distension.      Palpations: Abdomen is soft.   Musculoskeletal:      Right lower leg: No edema.      Left lower leg: No edema.   Skin:     General: Skin is warm and dry.      Findings: No ecchymosis, erythema or rash.   Neurological:      Mental Status: He is alert.         Labs Reviewed   CBC AND DIFFERENTIAL - Abnormal       Result Value    WBC 5.64      RBC 4.17      Hemoglobin 14.5      Hematocrit 41.1      MCV 99 (*)     MCH  34.8 (*)     MCHC 35.3      RDW 11.5 (*)     MPV 10.3      Platelets 254      nRBC 0      Segmented % 57      Immature Grans % 1      Lymphocytes % 28      Monocytes % 11      Eosinophils Relative 2      Basophils Relative 1      Absolute Neutrophils 3.27      Absolute Immature Grans 0.05      Absolute Lymphocytes 1.57      Absolute Monocytes 0.63      Eosinophils Absolute 0.09      Basophils Absolute 0.03     COMPREHENSIVE METABOLIC PANEL - Abnormal    Sodium 134 (*)     Potassium 3.7      Chloride 99      CO2 23      ANION GAP 12      BUN 15      Creatinine 0.73      Glucose 208 (*)     Calcium 9.3      AST 18      ALT 25      Alkaline Phosphatase 56      Total Protein 6.9      Albumin 4.6      Total Bilirubin 0.60      eGFR 111      Narrative:     National Kidney Disease Foundation guidelines for Chronic Kidney Disease (CKD):     Stage 1 with normal or high GFR (GFR > 90 mL/min/1.73 square meters)    Stage 2 Mild CKD (GFR = 60-89 mL/min/1.73 square meters)    Stage 3A Moderate CKD (GFR = 45-59 mL/min/1.73 square meters)    Stage 3B Moderate CKD (GFR = 30-44 mL/min/1.73 square meters)    Stage 4 Severe CKD (GFR = 15-29 mL/min/1.73 square meters)    Stage 5 End Stage CKD (GFR <15 mL/min/1.73 square meters)  Note: GFR calculation is accurate only with a steady state creatinine   HS TROPONIN I 0HR - Normal    hs TnI 0hr 4       X-ray chest 2 views   ED Interpretation by Jenni Bolton PA-C (09/17 1916)   NAD       Final Interpretation by Fermin Ayon MD (09/17 2155)      No acute cardiopulmonary disease.            Workstation performed: [id number]             Procedures       Jenni Bolton PA-C  09/18/24 8545

## 2024-09-18 NOTE — PROGRESS NOTES
Ambulatory Visit  Name: Jamie Mujica Jr.      : 1975      MRN: [id number]  Encounter Provider: Thom Vargas MD  Encounter Date: 2024   Encounter department: [facility]    Assessment & Plan  Atypical chest pain  Likely noncardiac  Differentials include anxiety related  Point-of-care EKG with no ST elevations or depression.  Chest x-ray from yesterday was within normal limits  Start patient on propranolol 10 mg 3 times daily as needed.  Patient has propranolol prescription at home.  Start Lexapro    Orders:    POCT ECG    Panic attack    Orders:    escitalopram (LEXAPRO) 10 mg tablet; Take 1 tablet (10 mg total) by mouth daily  Recommend propranolol 10 mg 3 times daily as needed  Restart patient back on Lexapro  Recommend outpatient mental health counseling  Current vaping on some days  She also patient on cessation of vaping  Orders:    albuterol (Ventolin HFA) 90 mcg/act inhaler; Inhale 2 puffs every 6 (six) hours as needed for wheezing       History of Present Illness     48-year-old male with a history of rheumatoid arthritis who presents today for ED follow-up.  Patient states that he went to the ED for chest pain.  He has been experiencing intermittent chest pain for the past couple of weeks occurs after vaping.  Symptoms only happen when he vapes.  Patient states that he got really paranoid after smoking a couple of days ago.  His symptoms has been getting progressively worse.  He went to the ER yesterday and his chest x-ray was negative.  He states that he feels very paranoid because he read online about lung injuries related to vaping.  He still used vaping last night.              Review of Systems   Constitutional:  Negative for chills, diaphoresis, fatigue and fever.   Respiratory:  Negative for shortness of breath.    Cardiovascular:  Positive for chest pain. Negative for palpitations.   Gastrointestinal:  Negative for abdominal pain,  diarrhea, nausea and vomiting.   Musculoskeletal:  Negative for arthralgias.   Skin:  Negative for rash.   Neurological:  Negative for dizziness, syncope, light-headedness and headaches.   Psychiatric/Behavioral:  Positive for sleep disturbance. Negative for confusion and hallucinations. The patient is nervous/anxious.            Objective     /82 (BP Location: Left arm, Patient Position: Sitting, Cuff Size: Standard) Comment: Does not take bp meds  Pulse 83   Temp 98 °F (36.7 °C) (Temporal)   Resp 18   Ht 6' (1.829 m)   Wt 79.8 kg (175 lb 14.4 oz)   SpO2 97%   BMI 23.86 kg/m²     Physical Exam  Vitals and nursing note reviewed.   Constitutional:       General: He is not in acute distress.     Appearance: Normal appearance. He is well-developed. He is not ill-appearing, toxic-appearing or diaphoretic.   HENT:      Head: Normocephalic and atraumatic.      Right Ear: External ear normal.      Left Ear: External ear normal.   Eyes:      General: No scleral icterus.        Right eye: No discharge.         Left eye: No discharge.      Extraocular Movements: Extraocular movements intact.      Conjunctiva/sclera: Conjunctivae normal.   Cardiovascular:      Rate and Rhythm: Normal rate and regular rhythm.      Heart sounds: Normal heart sounds. No murmur heard.     No friction rub. No gallop.   Pulmonary:      Effort: Pulmonary effort is normal. No respiratory distress.      Breath sounds: Normal breath sounds. No stridor. No wheezing or rhonchi.   Abdominal:      General: There is no distension.      Palpations: Abdomen is soft. There is no mass.      Tenderness: There is no abdominal tenderness.      Hernia: No hernia is present.   Musculoskeletal:      Cervical back: Normal range of motion.      Right lower leg: No edema.      Left lower leg: No edema.   Skin:     General: Skin is warm.      Capillary Refill: Capillary refill takes less than 2 seconds.   Neurological:      General: No focal deficit present.       Mental Status: He is alert.      Cranial Nerves: No cranial nerve deficit.      Motor: No weakness.      Gait: Gait normal.   Psychiatric:         Mood and Affect: Mood normal.         Behavior: Behavior normal.

## 2024-09-18 NOTE — Clinical Note
Jamie Mujica was seen and treated in our emergency department on 9/18/2024.                Diagnosis:     Jamie  may return to work on return date.    He may return on this date: 09/23/2024         If you have any questions or concerns, please don't hesitate to call.      Perry Harden MD    ______________________________           _______________          _______________  Hospital Representative                              Date                                Time

## 2024-09-26 DIAGNOSIS — E11.9 TYPE 2 DIABETES MELLITUS WITHOUT COMPLICATION, WITHOUT LONG-TERM CURRENT USE OF INSULIN (HCC): ICD-10-CM

## 2024-09-26 RX ORDER — SITAGLIPTIN 100 MG/1
100 TABLET, FILM COATED ORAL DAILY
Qty: 90 TABLET | Refills: 1 | Status: SHIPPED | OUTPATIENT
Start: 2024-09-26

## 2024-10-11 DIAGNOSIS — F41.0 PANIC ATTACK: ICD-10-CM

## 2024-10-11 RX ORDER — ESCITALOPRAM OXALATE 10 MG/1
10 TABLET ORAL DAILY
Qty: 90 TABLET | Refills: 1 | Status: SHIPPED | OUTPATIENT
Start: 2024-10-11

## 2024-10-16 ENCOUNTER — OFFICE VISIT (OUTPATIENT)
Dept: FAMILY MEDICINE CLINIC | Facility: CLINIC | Age: 49
End: 2024-10-16

## 2024-10-16 VITALS
WEIGHT: 175 LBS | HEIGHT: 72 IN | DIASTOLIC BLOOD PRESSURE: 80 MMHG | RESPIRATION RATE: 16 BRPM | HEART RATE: 79 BPM | SYSTOLIC BLOOD PRESSURE: 120 MMHG | TEMPERATURE: 97.6 F | OXYGEN SATURATION: 97 % | BODY MASS INDEX: 23.7 KG/M2

## 2024-10-16 DIAGNOSIS — F41.0 PANIC ATTACK: ICD-10-CM

## 2024-10-16 DIAGNOSIS — Z23 NEED FOR COVID-19 VACCINE: ICD-10-CM

## 2024-10-16 DIAGNOSIS — Z23 ENCOUNTER FOR IMMUNIZATION: ICD-10-CM

## 2024-10-16 DIAGNOSIS — E11.8 TYPE 2 DIABETES MELLITUS WITH COMPLICATION, WITHOUT LONG-TERM CURRENT USE OF INSULIN (HCC): Primary | ICD-10-CM

## 2024-10-16 LAB — SL AMB POCT HEMOGLOBIN AIC: 6.4 (ref ?–6.5)

## 2024-10-16 PROCEDURE — 83036 HEMOGLOBIN GLYCOSYLATED A1C: CPT | Performed by: FAMILY MEDICINE

## 2024-10-16 PROCEDURE — 99214 OFFICE O/P EST MOD 30 MIN: CPT | Performed by: FAMILY MEDICINE

## 2024-10-16 PROCEDURE — 90673 RIV3 VACCINE NO PRESERV IM: CPT | Performed by: FAMILY MEDICINE

## 2024-10-16 PROCEDURE — 90480 ADMN SARSCOV2 VAC 1/ONLY CMP: CPT | Performed by: FAMILY MEDICINE

## 2024-10-16 PROCEDURE — 91320 SARSCV2 VAC 30MCG TRS-SUC IM: CPT | Performed by: FAMILY MEDICINE

## 2024-10-16 PROCEDURE — G0008 ADMIN INFLUENZA VIRUS VAC: HCPCS | Performed by: FAMILY MEDICINE

## 2024-10-16 RX ORDER — ROSUVASTATIN CALCIUM 10 MG/1
10 TABLET, COATED ORAL DAILY
Qty: 90 TABLET | Refills: 5 | Status: SHIPPED | OUTPATIENT
Start: 2024-10-16

## 2024-10-16 RX ORDER — LEFLUNOMIDE 20 MG/1
20 TABLET ORAL DAILY
COMMUNITY
Start: 2024-09-05

## 2024-10-16 NOTE — ASSESSMENT & PLAN NOTE
Lab Results   Component Value Date    HGBA1C 6.4 10/16/2024   Well-controlled  He self discontinued Jardiance  Continue metformin and Januvia  Recommend low carbohydrate diet  Continue statin  Recommend yearly ophthalmology evaluation    Orders:    POCT hemoglobin A1c    rosuvastatin (CRESTOR) 10 MG tablet; Take 1 tablet (10 mg total) by mouth daily

## 2024-10-16 NOTE — PROGRESS NOTES
Ambulatory Visit  Name: Jamie Mujica Jr.      : 1975      MRN: [id number]  Encounter Provider: Thom Vargas MD  Encounter Date: 10/16/2024   Encounter department: [facility]    Assessment & Plan  Type 2 diabetes mellitus with complication, without long-term current use of insulin (HCC)    Lab Results   Component Value Date    HGBA1C 6.4 10/16/2024   Well-controlled  He self discontinued Jardiance  Continue metformin and Januvia  Recommend low carbohydrate diet  Continue statin  Recommend yearly ophthalmology evaluation    Orders:    POCT hemoglobin A1c    rosuvastatin (CRESTOR) 10 MG tablet; Take 1 tablet (10 mg total) by mouth daily    Panic attack  Was prescribed SSRI but unsure if he is taking any of these.  Counseling provided to patient  States that he has propranolol as needed that he uses at home  Recommend outpatient counseling if symptoms persist       Encounter for immunization    Orders:    influenza vaccine preservative-free 0.5 mL IM (Fluzone, Afluria, Fluarix, Flulaval)    Need for COVID-19 vaccine    Orders:    COVID-19 Pfizer mRNA vaccine 12 yr and older (Comirnaty pre-filled syringe)       History of Present Illness     48-year-old male with a history of rheumatoid arthritis and type 2 diabetes who presents today for follow-up.  He is doing okay overall.  Patient reports occasional panic attack.  He was prescribed SSRI but he states that he has not started taking his medication yet.  He is not even sure if he has a prescription at home.  He states that occasionally he does take propranolol as needed.  He self discontinued Jardiance for unknown reason.  He is currently only taking metformin and Januvia.          Review of Systems   Constitutional:  Negative for chills, diaphoresis, fatigue and fever.   Eyes:  Negative for visual disturbance.   Respiratory:  Negative for shortness of breath and wheezing.    Cardiovascular:  Negative for chest  pain and palpitations.   Gastrointestinal:  Negative for abdominal pain, diarrhea, nausea and vomiting.   Genitourinary:  Negative for dysuria.   Musculoskeletal:  Negative for arthralgias and back pain.   Skin:  Negative for rash.   Neurological:  Negative for dizziness, seizures, syncope, light-headedness and headaches.   Psychiatric/Behavioral:  Negative for confusion and hallucinations. The patient is not nervous/anxious.            Objective     /80 (BP Location: Left arm, Patient Position: Sitting, Cuff Size: Standard)   Pulse 79   Temp 97.6 °F (36.4 °C) (Temporal)   Resp 16   Ht 6' (1.829 m)   Wt 79.4 kg (175 lb)   SpO2 97%   BMI 23.73 kg/m²     Physical Exam  Vitals reviewed.   Constitutional:       General: He is not in acute distress.     Appearance: Normal appearance. He is well-developed. He is not ill-appearing, toxic-appearing or diaphoretic.   HENT:      Head: Normocephalic and atraumatic.      Right Ear: External ear normal.      Left Ear: External ear normal.      Nose: Nose normal.      Mouth/Throat:      Mouth: Mucous membranes are moist.      Pharynx: No oropharyngeal exudate or posterior oropharyngeal erythema.   Eyes:      General: No scleral icterus.        Right eye: No discharge.         Left eye: No discharge.      Extraocular Movements: Extraocular movements intact.      Conjunctiva/sclera: Conjunctivae normal.   Cardiovascular:      Rate and Rhythm: Normal rate and regular rhythm.      Pulses: no weak pulses.           Dorsalis pedis pulses are 2+ on the right side and 2+ on the left side.        Posterior tibial pulses are 2+ on the right side and 2+ on the left side.      Heart sounds: Normal heart sounds. No murmur heard.     No friction rub. No gallop.   Pulmonary:      Effort: Pulmonary effort is normal. No respiratory distress.      Breath sounds: Normal breath sounds. No stridor. No wheezing or rhonchi.   Abdominal:      General: Bowel sounds are normal. There is no  distension.      Palpations: Abdomen is soft. There is no mass.      Tenderness: There is no abdominal tenderness.      Hernia: No hernia is present.   Musculoskeletal:         General: Normal range of motion.      Cervical back: Normal range of motion.      Right lower leg: No edema.      Left lower leg: No edema.      Right foot: Deformity and bunion present.      Left foot: Deformity and bunion present.   Feet:      Right foot:      Skin integrity: No ulcer, skin breakdown, erythema, warmth, callus or dry skin.      Left foot:      Skin integrity: No ulcer, skin breakdown, erythema, warmth, callus or dry skin.   Skin:     General: Skin is warm.      Capillary Refill: Capillary refill takes less than 2 seconds.   Neurological:      General: No focal deficit present.      Mental Status: He is alert and oriented to person, place, and time.      Gait: Gait normal.   Psychiatric:         Mood and Affect: Mood normal.         Behavior: Behavior normal.         Diabetic Foot Exam    Patient's shoes and socks removed.    Right Foot/Ankle   Right Foot Inspection  Skin Exam: skin normal and skin intact. No dry skin, no warmth, no callus, no erythema, no maceration, no abnormal color, no pre-ulcer, no ulcer and no callus.     Toe Exam: ROM and strength within normal limits and right toe deformity. No swelling, no tenderness and erythema    Sensory   Proprioception: intact  Monofilament testing: intact    Vascular  Capillary refills: < 3 seconds  The right DP pulse is 2+. The right PT pulse is 2+.     Right Toe  - Comprehensive Exam  Hallux valgus: yes      Left Foot/Ankle  Left Foot Inspection  Skin Exam: skin normal and skin intact. No dry skin, no warmth, no erythema, no maceration, normal color, no pre-ulcer, no ulcer and no callus.     Toe Exam: ROM and strength within normal limits and left toe deformity. No swelling, no tenderness and no erythema.     Sensory   Proprioception: intact  Monofilament testing:  intact    Vascular  Capillary refills: < 3 seconds  The left DP pulse is 2+. The left PT pulse is 2+.     Left Toe  - Comprehensive Exam  Hallux valgus: yes      Assign Risk Category  Deformity present  No loss of protective sensation  No weak pulses  Risk: 0

## 2024-12-03 ENCOUNTER — VBI (OUTPATIENT)
Dept: ADMINISTRATIVE | Facility: OTHER | Age: 49
End: 2024-12-03

## 2024-12-03 NOTE — TELEPHONE ENCOUNTER
12/03/24 7:59 AM     Chart reviewed for Hemoglobin A1c was/were submitted to the patient's insurance.     Jimbo Scanlon MA   [facility]

## 2024-12-23 ENCOUNTER — APPOINTMENT (OUTPATIENT)
Dept: LAB | Facility: HOSPITAL | Age: 49
End: 2024-12-23
Payer: MEDICARE

## 2024-12-23 DIAGNOSIS — M05.79 SEROPOSITIVE RHEUMATOID ARTHRITIS OF MULTIPLE SITES (HCC): ICD-10-CM

## 2024-12-23 LAB
ALBUMIN SERPL BCG-MCNC: 4.8 G/DL (ref 3.5–5)
ALP SERPL-CCNC: 61 U/L (ref 34–104)
ALT SERPL W P-5'-P-CCNC: 90 U/L (ref 7–52)
ANION GAP SERPL CALCULATED.3IONS-SCNC: 7 MMOL/L (ref 4–13)
AST SERPL W P-5'-P-CCNC: 30 U/L (ref 13–39)
BASOPHILS # BLD AUTO: 0.03 THOUSANDS/ÂΜL (ref 0–0.1)
BASOPHILS NFR BLD AUTO: 0 % (ref 0–1)
BILIRUB SERPL-MCNC: 0.92 MG/DL (ref 0.2–1)
BUN SERPL-MCNC: 19 MG/DL (ref 5–25)
CALCIUM SERPL-MCNC: 10.6 MG/DL (ref 8.4–10.2)
CHLORIDE SERPL-SCNC: 104 MMOL/L (ref 96–108)
CO2 SERPL-SCNC: 29 MMOL/L (ref 21–32)
CREAT SERPL-MCNC: 0.72 MG/DL (ref 0.6–1.3)
CRP SERPL QL: <1 MG/L
EOSINOPHIL # BLD AUTO: 0.11 THOUSAND/ÂΜL (ref 0–0.61)
EOSINOPHIL NFR BLD AUTO: 1 % (ref 0–6)
ERYTHROCYTE [DISTWIDTH] IN BLOOD BY AUTOMATED COUNT: 11.4 % (ref 11.6–15.1)
ERYTHROCYTE [SEDIMENTATION RATE] IN BLOOD: 2 MM/HOUR (ref 0–14)
GFR SERPL CREATININE-BSD FRML MDRD: 111 ML/MIN/1.73SQ M
GLUCOSE SERPL-MCNC: 188 MG/DL (ref 65–140)
HCT VFR BLD AUTO: 43.6 % (ref 36.5–49.3)
HGB BLD-MCNC: 15.1 G/DL (ref 12–17)
IMM GRANULOCYTES # BLD AUTO: 0.02 THOUSAND/UL (ref 0–0.2)
IMM GRANULOCYTES NFR BLD AUTO: 0 % (ref 0–2)
LYMPHOCYTES # BLD AUTO: 1.42 THOUSANDS/ÂΜL (ref 0.6–4.47)
LYMPHOCYTES NFR BLD AUTO: 18 % (ref 14–44)
MCH RBC QN AUTO: 34.6 PG (ref 26.8–34.3)
MCHC RBC AUTO-ENTMCNC: 34.6 G/DL (ref 31.4–37.4)
MCV RBC AUTO: 100 FL (ref 82–98)
MONOCYTES # BLD AUTO: 0.68 THOUSAND/ÂΜL (ref 0.17–1.22)
MONOCYTES NFR BLD AUTO: 9 % (ref 4–12)
NEUTROPHILS # BLD AUTO: 5.49 THOUSANDS/ÂΜL (ref 1.85–7.62)
NEUTS SEG NFR BLD AUTO: 72 % (ref 43–75)
NRBC BLD AUTO-RTO: 0 /100 WBCS
PLATELET # BLD AUTO: 234 THOUSANDS/UL (ref 149–390)
PMV BLD AUTO: 11.3 FL (ref 8.9–12.7)
POTASSIUM SERPL-SCNC: 4 MMOL/L (ref 3.5–5.3)
PROT SERPL-MCNC: 7.1 G/DL (ref 6.4–8.4)
RBC # BLD AUTO: 4.36 MILLION/UL (ref 3.88–5.62)
SODIUM SERPL-SCNC: 140 MMOL/L (ref 135–147)
WBC # BLD AUTO: 7.75 THOUSAND/UL (ref 4.31–10.16)

## 2024-12-23 PROCEDURE — 85025 COMPLETE CBC W/AUTO DIFF WBC: CPT

## 2024-12-23 PROCEDURE — 85652 RBC SED RATE AUTOMATED: CPT

## 2024-12-23 PROCEDURE — 86140 C-REACTIVE PROTEIN: CPT

## 2024-12-23 PROCEDURE — 36415 COLL VENOUS BLD VENIPUNCTURE: CPT

## 2024-12-23 PROCEDURE — 80053 COMPREHEN METABOLIC PANEL: CPT

## 2025-01-02 ENCOUNTER — OFFICE VISIT (OUTPATIENT)
Dept: FAMILY MEDICINE CLINIC | Facility: CLINIC | Age: 50
End: 2025-01-02

## 2025-01-02 VITALS
HEART RATE: 106 BPM | SYSTOLIC BLOOD PRESSURE: 132 MMHG | BODY MASS INDEX: 23.32 KG/M2 | TEMPERATURE: 98.6 F | RESPIRATION RATE: 18 BRPM | OXYGEN SATURATION: 97 % | WEIGHT: 172.2 LBS | DIASTOLIC BLOOD PRESSURE: 84 MMHG | HEIGHT: 72 IN

## 2025-01-02 DIAGNOSIS — L02.611 ABSCESS OF RIGHT HEEL: Primary | ICD-10-CM

## 2025-01-02 PROCEDURE — 87147 CULTURE TYPE IMMUNOLOGIC: CPT

## 2025-01-02 PROCEDURE — 87186 SC STD MICRODIL/AGAR DIL: CPT

## 2025-01-02 PROCEDURE — 87205 SMEAR GRAM STAIN: CPT

## 2025-01-02 PROCEDURE — 87070 CULTURE OTHR SPECIMN AEROBIC: CPT

## 2025-01-02 PROCEDURE — 99213 OFFICE O/P EST LOW 20 MIN: CPT

## 2025-01-02 PROCEDURE — 10140 I&D HMTMA SEROMA/FLUID COLLJ: CPT

## 2025-01-02 RX ORDER — SULFAMETHOXAZOLE AND TRIMETHOPRIM 800; 160 MG/1; MG/1
1 TABLET ORAL 2 TIMES DAILY
Qty: 14 TABLET | Refills: 0 | Status: SHIPPED | OUTPATIENT
Start: 2025-01-02 | End: 2025-01-09

## 2025-01-02 NOTE — PROGRESS NOTES
Name: Jamie Mujica Jr.      : 1975      MRN: [id number]  Encounter Provider: HAZEL Chandra  Encounter Date: 2025   Encounter department: [facility]  :  Assessment & Plan  Abscess of right heel  - Case discussed with the Dr. Lake and Dr. Conner For I and D.   - I&D performed will send for culture   - Will empirically treat with abx  - F/U in 2 weeks or PRN  - ED precaution discussed  - Pt verbalized understanding    Orders:    sulfamethoxazole-trimethoprim (BACTRIM DS) 800-160 mg per tablet; Take 1 tablet by mouth 2 (two) times a day for 7 days    Body fluid culture and Gram stain; Future    Incision and Drainage           History of Present Illness     Patient is a 49 y.o. male whom  has a past medical history of Abscess of lower leg (2020), Back muscle spasm (10/21/2021), Dental abscess (2022), Diabetes mellitus ([facility]), Elbow laceration, left, initial encounter (2021), Foot pain, bilateral (2018), GERD (gastroesophageal reflux disease) (2019), Rheumatoid arthritis ([facility]), and Venous stasis dermatitis of left lower extremity (2019). who is seen today in office for complaint of blister, swelling and pain of the right foot. Pt Notes the had a crack skin on his heel last week and he was putting cream on, few days later he observed a blister at the area.  Couple of days ago he noticed dark area and redness with pain. Pt denies any injuries/trauma, insect bites, fever or any other associated symptoms. He has not taken anything for the pain.          Review of Systems   Constitutional: Negative.  Negative for chills, fatigue and fever.   HENT: Negative.     Eyes: Negative.    Respiratory: Negative.     Cardiovascular:  Negative for chest pain.   Gastrointestinal:  Negative for abdominal distention, abdominal pain, diarrhea, nausea and rectal pain.   Genitourinary: Negative.    Musculoskeletal:  Positive for arthralgias, gait  problem, joint swelling and myalgias. Negative for neck pain and neck stiffness.   Skin:  Positive for color change. Negative for wound.        Right heel   Hematological: Negative.    Psychiatric/Behavioral: Negative.         Objective   /84 (BP Location: Left arm, Patient Position: Sitting, Cuff Size: Standard)   Pulse (!) 106   Temp 98.6 °F (37 °C) (Temporal)   Resp 18   Ht 6' (1.829 m)   Wt 78.1 kg (172 lb 3.2 oz)   SpO2 97%   BMI 23.35 kg/m²      Physical Exam  Vitals reviewed.   Constitutional:       Appearance: Normal appearance.   HENT:      Head: Normocephalic and atraumatic.      Right Ear: Tympanic membrane normal.      Left Ear: Tympanic membrane normal.      Nose: Nose normal.      Mouth/Throat:      Mouth: Mucous membranes are moist.   Eyes:      Extraocular Movements: Extraocular movements intact.      Pupils: Pupils are equal, round, and reactive to light.   Cardiovascular:      Rate and Rhythm: Normal rate.      Pulses: Normal pulses.      Heart sounds: Normal heart sounds.   Abdominal:      General: Bowel sounds are normal.      Palpations: Abdomen is soft.   Musculoskeletal:         General: Tenderness and deformity present.      Cervical back: Normal range of motion.   Skin:     General: Skin is warm and dry.      Capillary Refill: Capillary refill takes less than 2 seconds.      Findings: Erythema and lesion present.   Neurological:      General: No focal deficit present.      Mental Status: He is alert.   Psychiatric:         Mood and Affect: Mood normal.     Dalila   Incision and Drainage    Date/Time: 1/2/2025 11:00 AM    Performed by: Vandana Conner MD  Authorized by: Kymberly Lake MD  Universal Protocol:  Procedure performed by:  Consent: Verbal consent obtained.  Risks and benefits: risks, benefits and alternatives were discussed  Consent given by: patient  Timeout called at: 1/2/2025 12:29 PM.  Patient understanding: patient states understanding of the procedure  being performed  Patient consent: the patient's understanding of the procedure matches consent given  Procedure consent: procedure consent matches procedure scheduled  Relevant documents: relevant documents present and verified  Site marked: the operative site was marked  Patient identity confirmed: verbally with patient    Patient location:  Clinic  Location:     Type:  Fluid collection    Size:  Apx 2-3 cm    Location:  Lower extremity    Lower extremity location:  L foot  Pre-procedure details:     Skin preparation:  Antiseptic wash and Betadine  Anesthesia (see MAR for exact dosages):     Anesthesia method:  Topical application  Procedure details:     Complexity:  Simple    Needle aspiration: no      Incision types:  Stab incision    Scalpel blade:  10    Approach:  Open    Incision depth:  Submucosal    Wound management:  Probed and deloculated    Drainage:  Bloody    Drainage amount:  Scant    Wound treatment:  Wound left open    Packing materials:  None  Post-procedure details:     Patient tolerance of procedure:  Tolerated well, no immediate complications  Comments:      Culture sent out on his left lateral heel.  Appropriate antibiotic prophylaxis provided

## 2025-01-02 NOTE — LETTER
January 2, 2025     Patient: Jamie Mujica Jr.  YOB: 1975  Date of Visit: 1/2/2025      To Whom it May Concern:    Jamie Mujica is under my professional care. Jamie was seen in my office on 1/2/2025. Jamie may return to work on 01/06/24 .    If you have any questions or concerns, please don't hesitate to call.         Sincerely,          SayHAZEL Emerson        CC: No Recipients

## 2025-01-03 ENCOUNTER — OFFICE VISIT (OUTPATIENT)
Dept: FAMILY MEDICINE CLINIC | Facility: CLINIC | Age: 50
End: 2025-01-03

## 2025-01-03 VITALS
DIASTOLIC BLOOD PRESSURE: 82 MMHG | HEART RATE: 92 BPM | SYSTOLIC BLOOD PRESSURE: 136 MMHG | WEIGHT: 171 LBS | BODY MASS INDEX: 23.16 KG/M2 | RESPIRATION RATE: 18 BRPM | HEIGHT: 72 IN | OXYGEN SATURATION: 94 % | TEMPERATURE: 97 F

## 2025-01-03 DIAGNOSIS — L02.611 ABSCESS OF HEEL, RIGHT: Primary | ICD-10-CM

## 2025-01-03 PROCEDURE — 99024 POSTOP FOLLOW-UP VISIT: CPT

## 2025-01-03 NOTE — PROGRESS NOTES
Name: Jamie Mujica Jr.      : 1975      MRN: [id number]  Encounter Provider: HAZEL Chandra  Encounter Date: 1/3/2025   Encounter department: [facility]  :  Assessment & Plan  Abscess of heel, right  - I&D performed yesterday, Pt returned today with complaint closer of incision and increased drainage. Abscess drained again today, covered with 2 X 2 dressing and secured with bandage.  - F/u in 2 weeks or as needed   - Encourage Pt complete abx, infection control, and rest the heel  -  Culture showing   Rare Gram positive cocci in pairs   No polys seen                   History of Present Illness     Patient is a 49 y.o. male whom  has a past medical history of Abscess of lower leg (2020), Back muscle spasm (10/21/2021), Dental abscess (2022), Diabetes mellitus ([facility]), Elbow laceration, left, initial encounter (2021), Foot pain, bilateral (2018), GERD (gastroesophageal reflux disease) (2019), Rheumatoid arthritis ([facility]), and Venous stasis dermatitis of left lower extremity (2019). who is seen today in office for Abscess.  Pt was seen yesterday for right heel abscess. Abscess drained and culture sent. Pt called this morning stating he developed a new blister. Pt reports that abscess was draining overnight, this morning he observed it has closed and there is drainage/blister. Pt denies any fever. He started the abx last night.          Review of Systems   Constitutional: Negative.  Negative for chills and fever.   HENT: Negative.     Respiratory: Negative.     Cardiovascular: Negative.    Gastrointestinal: Negative.    Musculoskeletal:  Positive for gait problem and myalgias.   Skin:  Positive for wound.   Hematological: Negative.        Objective   /82 (BP Location: Left arm, Patient Position: Sitting, Cuff Size: Large)   Pulse 92   Temp (!) 97 °F (36.1 °C) (Temporal)   Resp 18   Ht 6' (1.829 m)   Wt 77.6 kg (171 lb)   SpO2  94%   BMI 23.19 kg/m²      Physical Exam  Constitutional:       General: He is not in acute distress.     Appearance: Normal appearance. He is not ill-appearing or toxic-appearing.   HENT:      Head: Normocephalic and atraumatic.      Nose: Nose normal.      Mouth/Throat:      Mouth: Mucous membranes are moist.   Eyes:      Extraocular Movements: Extraocular movements intact.      Pupils: Pupils are equal, round, and reactive to light.   Cardiovascular:      Rate and Rhythm: Normal rate.      Pulses: Normal pulses.      Heart sounds: Normal heart sounds.   Pulmonary:      Effort: Pulmonary effort is normal.      Breath sounds: Normal breath sounds.   Abdominal:      General: Bowel sounds are normal.      Palpations: Abdomen is soft.   Musculoskeletal:         General: Normal range of motion.   Skin:     General: Skin is warm.      Findings: Erythema and lesion present.      Comments: Right heel    Neurological:      General: No focal deficit present.      Mental Status: He is alert and oriented to person, place, and time.   Psychiatric:         Mood and Affect: Mood normal.         Behavior: Behavior normal.

## 2025-01-05 LAB
BACTERIA SPEC BFLD CULT: ABNORMAL
GRAM STN SPEC: ABNORMAL
GRAM STN SPEC: ABNORMAL

## 2025-01-06 ENCOUNTER — TELEPHONE (OUTPATIENT)
Dept: FAMILY MEDICINE CLINIC | Facility: CLINIC | Age: 50
End: 2025-01-06

## 2025-01-06 DIAGNOSIS — E11.8 TYPE 2 DIABETES MELLITUS WITH COMPLICATION, WITHOUT LONG-TERM CURRENT USE OF INSULIN (HCC): ICD-10-CM

## 2025-01-06 NOTE — TELEPHONE ENCOUNTER
----- Message from HAZEL Chandra sent at 1/5/2025  3:05 PM EST -----  Regarding: wound culture  Please let Pt know that the wound cx is positive for MRSA. He already on abx but he should make sure to keep the wound cover til it is heel, MRSA infectious, therefore hand hygiene and make sure to not share towels or anything that come in contact with wound. Double beg the old soil dressing before putting a the garbage. Thanks

## 2025-01-16 ENCOUNTER — OFFICE VISIT (OUTPATIENT)
Dept: FAMILY MEDICINE CLINIC | Facility: CLINIC | Age: 50
End: 2025-01-16

## 2025-01-16 VITALS
DIASTOLIC BLOOD PRESSURE: 78 MMHG | SYSTOLIC BLOOD PRESSURE: 114 MMHG | RESPIRATION RATE: 18 BRPM | HEIGHT: 72 IN | BODY MASS INDEX: 23.72 KG/M2 | TEMPERATURE: 97.5 F | OXYGEN SATURATION: 97 % | HEART RATE: 100 BPM | WEIGHT: 175.1 LBS

## 2025-01-16 DIAGNOSIS — E11.8 TYPE 2 DIABETES MELLITUS WITH COMPLICATION, WITHOUT LONG-TERM CURRENT USE OF INSULIN (HCC): Primary | ICD-10-CM

## 2025-01-16 DIAGNOSIS — S91.301D OPEN WOUND OF RIGHT HEEL, SUBSEQUENT ENCOUNTER: ICD-10-CM

## 2025-01-16 PROBLEM — S91.301A OPEN WOUND OF RIGHT HEEL: Status: ACTIVE | Noted: 2025-01-16

## 2025-01-16 LAB — SL AMB POCT HEMOGLOBIN AIC: 8.7 (ref ?–6.5)

## 2025-01-16 PROCEDURE — 83036 HEMOGLOBIN GLYCOSYLATED A1C: CPT | Performed by: FAMILY MEDICINE

## 2025-01-16 PROCEDURE — 99214 OFFICE O/P EST MOD 30 MIN: CPT | Performed by: FAMILY MEDICINE

## 2025-01-16 NOTE — ASSESSMENT & PLAN NOTE
Lab Results   Component Value Date    HGBA1C 8.7 (A) 01/16/2025     Poorly controlled secondary to dietary nonadherence  He send discontinue Jardiance few months back  Recommend to start taking jardiance again  Continue januvia and metformin  Orders:    POCT hemoglobin A1c

## 2025-01-16 NOTE — PROGRESS NOTES
Name: Jamie Mujica Jr.      : 1975      MRN: [id number]  Encounter Provider: Thom Vargas MD  Encounter Date: 2025   Encounter department: [facility]  :  Assessment & Plan  Type 2 diabetes mellitus with complication, without long-term current use of insulin (HCC)    Lab Results   Component Value Date    HGBA1C 8.7 (A) 2025     Poorly controlled secondary to dietary nonadherence  He send discontinue Jardiance few months back  Recommend to start taking jardiance again  Continue januvia and metformin  Orders:    POCT hemoglobin A1c    Open wound of right heel, subsequent encounter  Abscess drained a 2 weeks ago on his right heel  Has persistent  wound.  No active infection.  No surrounding erythema  Wound dressing changed for patient in the office  Continue wound care at home and daily dressing changes  Reassess in a couple of weeks  Instructed to call back if wound starts to drain or if it becomes painful or swollen              History of Present Illness     49-year-old male with a history of rheumatoid arthritis and type 2 diabetes who presents today for follow-up.  He is doing well overall.  He recently had an abscess on his right heel that was drained.  He was given antibiotics.  He feels much better.  He still has a wound in his right heel.  He has been changing the dressing daily.  He denies any pain, swelling, redness, or any significant drainage from the wound.  He self discontinued his Jardiance a few months ago.  He has a prescriptions at home but he does not take it.  He reports that he has been eating a lot of sweets and candy during the holidays.      Review of Systems   Constitutional: Negative.  Negative for appetite change, chills, diaphoresis, fatigue and fever.   HENT: Negative.     Eyes:  Negative for visual disturbance.   Respiratory:  Negative for shortness of breath and wheezing.    Cardiovascular:  Negative for chest pain and  palpitations.   Gastrointestinal:  Negative for abdominal pain, diarrhea, nausea and vomiting.   Genitourinary:  Negative for dysuria.   Musculoskeletal:  Negative for arthralgias.   Skin:  Positive for wound.   Neurological:  Negative for dizziness, seizures, syncope, light-headedness and headaches.   Hematological: Negative.    Psychiatric/Behavioral:  Negative for confusion and hallucinations. The patient is not nervous/anxious.        Objective   /78 (BP Location: Right arm, Patient Position: Sitting, Cuff Size: Standard)   Pulse 100   Temp 97.5 °F (36.4 °C) (Temporal)   Resp 18   Ht 6' (1.829 m)   Wt 79.4 kg (175 lb 1.6 oz)   SpO2 97%   BMI 23.75 kg/m²      Physical Exam  Vitals reviewed.   Constitutional:       General: He is not in acute distress.     Appearance: Normal appearance. He is well-developed. He is not ill-appearing, toxic-appearing or diaphoretic.   HENT:      Head: Normocephalic and atraumatic.      Right Ear: External ear normal.      Left Ear: External ear normal.      Nose: Nose normal.      Mouth/Throat:      Mouth: Mucous membranes are moist.      Pharynx: No oropharyngeal exudate or posterior oropharyngeal erythema.   Eyes:      General: No scleral icterus.        Right eye: No discharge.         Left eye: No discharge.      Extraocular Movements: Extraocular movements intact.      Conjunctiva/sclera: Conjunctivae normal.   Cardiovascular:      Rate and Rhythm: Normal rate and regular rhythm.      Heart sounds: Normal heart sounds. No murmur heard.     No friction rub. No gallop.   Pulmonary:      Effort: Pulmonary effort is normal. No respiratory distress.      Breath sounds: Normal breath sounds. No stridor. No wheezing or rhonchi.   Abdominal:      General: Bowel sounds are normal. There is no distension.      Palpations: Abdomen is soft. There is no mass.      Tenderness: There is no abdominal tenderness. There is no guarding.      Hernia: No hernia is present.    Musculoskeletal:         General: Normal range of motion.      Cervical back: Normal range of motion.      Right lower leg: No edema.      Left lower leg: No edema.        Feet:    Feet:      Comments: Wound present  Granulation tissue visible  No surrounding erythema or drainages    Skin:     General: Skin is warm.      Capillary Refill: Capillary refill takes less than 2 seconds.   Neurological:      General: No focal deficit present.      Mental Status: He is alert and oriented to person, place, and time.   Psychiatric:         Mood and Affect: Mood normal.         Behavior: Behavior normal.

## 2025-01-16 NOTE — ASSESSMENT & PLAN NOTE
Abscess drained a 2 weeks ago on his right heel  Has persistent  wound.  No active infection.  No surrounding erythema  Wound dressing changed for patient in the office  Continue wound care at home and daily dressing changes  Reassess in a couple of weeks  Instructed to call back if wound starts to drain or if it becomes painful or swollen

## 2025-01-22 ENCOUNTER — APPOINTMENT (OUTPATIENT)
Dept: LAB | Facility: HOSPITAL | Age: 50
End: 2025-01-22
Payer: COMMERCIAL

## 2025-01-22 ENCOUNTER — TRANSCRIBE ORDERS (OUTPATIENT)
Dept: ADMINISTRATIVE | Facility: HOSPITAL | Age: 50
End: 2025-01-22

## 2025-01-22 DIAGNOSIS — R74.8 ACID PHOSPHATASE ELEVATED: ICD-10-CM

## 2025-01-22 DIAGNOSIS — R74.8 ACID PHOSPHATASE ELEVATED: Primary | ICD-10-CM

## 2025-01-22 LAB
ALBUMIN SERPL BCG-MCNC: 4.4 G/DL (ref 3.5–5)
ALP SERPL-CCNC: 59 U/L (ref 34–104)
ALT SERPL W P-5'-P-CCNC: 75 U/L (ref 7–52)
ANION GAP SERPL CALCULATED.3IONS-SCNC: 9 MMOL/L (ref 4–13)
AST SERPL W P-5'-P-CCNC: 23 U/L (ref 13–39)
BILIRUB SERPL-MCNC: 0.49 MG/DL (ref 0.2–1)
BUN SERPL-MCNC: 21 MG/DL (ref 5–25)
CALCIUM SERPL-MCNC: 9.5 MG/DL (ref 8.4–10.2)
CHLORIDE SERPL-SCNC: 105 MMOL/L (ref 96–108)
CO2 SERPL-SCNC: 24 MMOL/L (ref 21–32)
CREAT SERPL-MCNC: 0.75 MG/DL (ref 0.6–1.3)
GFR SERPL CREATININE-BSD FRML MDRD: 109 ML/MIN/1.73SQ M
GLUCOSE SERPL-MCNC: 238 MG/DL (ref 65–140)
POTASSIUM SERPL-SCNC: 4.2 MMOL/L (ref 3.5–5.3)
PROT SERPL-MCNC: 6.7 G/DL (ref 6.4–8.4)
SODIUM SERPL-SCNC: 138 MMOL/L (ref 135–147)

## 2025-01-22 PROCEDURE — 80053 COMPREHEN METABOLIC PANEL: CPT

## 2025-01-22 PROCEDURE — 36415 COLL VENOUS BLD VENIPUNCTURE: CPT

## 2025-02-06 ENCOUNTER — TELEPHONE (OUTPATIENT)
Dept: FAMILY MEDICINE CLINIC | Facility: CLINIC | Age: 50
End: 2025-02-06

## 2025-02-10 ENCOUNTER — OFFICE VISIT (OUTPATIENT)
Dept: FAMILY MEDICINE CLINIC | Facility: CLINIC | Age: 50
End: 2025-02-10

## 2025-02-10 VITALS
BODY MASS INDEX: 22.89 KG/M2 | OXYGEN SATURATION: 96 % | RESPIRATION RATE: 16 BRPM | HEIGHT: 72 IN | WEIGHT: 169 LBS | SYSTOLIC BLOOD PRESSURE: 118 MMHG | TEMPERATURE: 97.6 F | DIASTOLIC BLOOD PRESSURE: 80 MMHG | HEART RATE: 107 BPM

## 2025-02-10 DIAGNOSIS — M06.9 RHEUMATOID ARTHRITIS, INVOLVING UNSPECIFIED SITE, UNSPECIFIED WHETHER RHEUMATOID FACTOR PRESENT (HCC): ICD-10-CM

## 2025-02-10 DIAGNOSIS — L85.9: Primary | ICD-10-CM

## 2025-02-10 DIAGNOSIS — S91.301D OPEN WOUND OF RIGHT HEEL, SUBSEQUENT ENCOUNTER: ICD-10-CM

## 2025-02-10 PROCEDURE — 99214 OFFICE O/P EST MOD 30 MIN: CPT | Performed by: FAMILY MEDICINE

## 2025-02-10 RX ORDER — UREA 8.5 G/85G
CREAM TOPICAL AS NEEDED
Qty: 453 G | Refills: 1 | Status: SHIPPED | OUTPATIENT
Start: 2025-02-10

## 2025-02-10 RX ORDER — IBUPROFEN 600 MG/1
600 TABLET, FILM COATED ORAL EVERY 8 HOURS PRN
Qty: 30 TABLET | Refills: 1 | Status: SHIPPED | OUTPATIENT
Start: 2025-02-10 | End: 2025-02-10

## 2025-02-10 RX ORDER — CELECOXIB 200 MG/1
200 CAPSULE ORAL 2 TIMES DAILY
Qty: 60 CAPSULE | Refills: 1 | Status: SHIPPED | OUTPATIENT
Start: 2025-02-10

## 2025-02-10 NOTE — PROGRESS NOTES
Name: Jamie Mujica Jr.      : 1975      MRN: [id number]  Encounter Provider: Thom Vargas MD  Encounter Date: 2/10/2025   Encounter department: [facility]  :  Assessment & Plan  Hyperkeratosis of forefoot  Will provide topical cream to use  Follow-up with podiatry outpatient  Orders:    urea (CARMOL) 10 % cream; Apply topically as needed for dry skin    Open wound of right heel, subsequent encounter  Patient had abscess drainage on his right heel a couple of weeks ago.  He has a wound that is not been healing well.  Continue dressing changes at home.  Provided wound care instructions to patient  Referral to wound care for further evaluation and management  Orders:    Ambulatory Referral to Wound Care; Future    Rheumatoid arthritis, involving unspecified site, unspecified whether rheumatoid factor present (HCC)  Continue DMARD  NSAIDs as needed  Follow-up with rheumatology outpatient  Orders:    celecoxib (CeleBREX) 200 mg capsule; Take 1 capsule (200 mg total) by mouth 2 (two) times a day           History of Present Illness   49-year-old male with a history of rheumatoid arthritis and type 2 diabetes who presents today for follow-up.  He is doing well overall.  He recently had an abscess on his right heel that was drained.  He was given antibiotics.  He feels much better.  He still has a wound in his right heel.  He has been changing the dressing daily at home.  He denies any pain, swelling, redness, or any purulent drainage from the wound.  The wound is not healing very well.      Review of Systems   Constitutional: Negative.  Negative for appetite change, chills, diaphoresis, fatigue and fever.   HENT: Negative.     Eyes:  Negative for visual disturbance.   Respiratory:  Negative for shortness of breath and wheezing.    Cardiovascular:  Negative for chest pain and palpitations.   Gastrointestinal:  Negative for abdominal pain, diarrhea, nausea and  vomiting.   Genitourinary:  Negative for dysuria.   Musculoskeletal:  Negative for arthralgias.   Skin:  Positive for wound.   Neurological:  Negative for dizziness, seizures, syncope, light-headedness and headaches.   Hematological: Negative.    Psychiatric/Behavioral:  Negative for confusion and hallucinations. The patient is not nervous/anxious.        Objective   /80 (BP Location: Right arm, Patient Position: Sitting, Cuff Size: Large)   Pulse (!) 107   Temp 97.6 °F (36.4 °C) (Temporal)   Resp 16   Ht 6' (1.829 m)   Wt 76.7 kg (169 lb)   SpO2 96%   BMI 22.92 kg/m²      Physical Exam  Vitals reviewed.   Constitutional:       General: He is not in acute distress.     Appearance: Normal appearance. He is well-developed. He is not ill-appearing, toxic-appearing or diaphoretic.   HENT:      Head: Normocephalic and atraumatic.      Right Ear: External ear normal.      Left Ear: External ear normal.      Nose: Nose normal.      Mouth/Throat:      Mouth: Mucous membranes are moist.      Pharynx: No oropharyngeal exudate or posterior oropharyngeal erythema.   Eyes:      General: No scleral icterus.        Right eye: No discharge.         Left eye: No discharge.      Extraocular Movements: Extraocular movements intact.      Conjunctiva/sclera: Conjunctivae normal.   Cardiovascular:      Rate and Rhythm: Normal rate and regular rhythm.      Heart sounds: Normal heart sounds. No murmur heard.     No friction rub. No gallop.   Pulmonary:      Effort: Pulmonary effort is normal. No respiratory distress.      Breath sounds: Normal breath sounds. No stridor. No wheezing or rhonchi.   Abdominal:      General: Bowel sounds are normal. There is no distension.      Palpations: Abdomen is soft. There is no mass.      Tenderness: There is no abdominal tenderness. There is no guarding.      Hernia: No hernia is present.   Musculoskeletal:         General: Normal range of motion.      Cervical back: Normal range of motion.       Right lower leg: No edema.      Left lower leg: No edema.      Right foot: Deformity present.      Left foot: Deformity present.        Feet:    Feet:      Right foot:      Skin integrity: Callus and dry skin present.      Left foot:      Skin integrity: Callus and dry skin present.      Comments: Wound present  Granulation tissue visible  No surrounding erythema or drainages    Skin:     General: Skin is warm.      Capillary Refill: Capillary refill takes less than 2 seconds.   Neurological:      General: No focal deficit present.      Mental Status: He is alert and oriented to person, place, and time.   Psychiatric:         Mood and Affect: Mood normal.         Behavior: Behavior normal.

## 2025-02-10 NOTE — ASSESSMENT & PLAN NOTE
Continue DMARD  NSAIDs as needed  Follow-up with rheumatology outpatient  Orders:    celecoxib (CeleBREX) 200 mg capsule; Take 1 capsule (200 mg total) by mouth 2 (two) times a day

## 2025-02-12 ENCOUNTER — OFFICE VISIT (OUTPATIENT)
Dept: WOUND CARE | Facility: CLINIC | Age: 50
End: 2025-02-12
Payer: COMMERCIAL

## 2025-02-12 VITALS
DIASTOLIC BLOOD PRESSURE: 60 MMHG | HEART RATE: 60 BPM | RESPIRATION RATE: 18 BRPM | TEMPERATURE: 95.6 F | SYSTOLIC BLOOD PRESSURE: 96 MMHG

## 2025-02-12 DIAGNOSIS — E11.42 DIABETIC POLYNEUROPATHY ASSOCIATED WITH TYPE 2 DIABETES MELLITUS (HCC): ICD-10-CM

## 2025-02-12 DIAGNOSIS — M79.671 RIGHT FOOT PAIN: ICD-10-CM

## 2025-02-12 DIAGNOSIS — E11.621 DIABETIC ULCER OF RIGHT MIDFOOT ASSOCIATED WITH TYPE 2 DIABETES MELLITUS, WITH FAT LAYER EXPOSED (HCC): Primary | ICD-10-CM

## 2025-02-12 DIAGNOSIS — L97.412 DIABETIC ULCER OF RIGHT MIDFOOT ASSOCIATED WITH TYPE 2 DIABETES MELLITUS, WITH FAT LAYER EXPOSED (HCC): Primary | ICD-10-CM

## 2025-02-12 PROCEDURE — 11042 DBRDMT SUBQ TIS 1ST 20SQCM/<: CPT | Performed by: PODIATRIST

## 2025-02-12 PROCEDURE — 99213 OFFICE O/P EST LOW 20 MIN: CPT | Performed by: PODIATRIST

## 2025-02-12 RX ORDER — LIDOCAINE HYDROCHLORIDE 20 MG/ML
1 JELLY TOPICAL AS NEEDED
Qty: 30 ML | Refills: 5 | Status: SHIPPED | OUTPATIENT
Start: 2025-02-12 | End: 2025-02-12

## 2025-02-12 RX ORDER — LIDOCAINE 40 MG/G
CREAM TOPICAL ONCE
Status: COMPLETED | OUTPATIENT
Start: 2025-02-12 | End: 2025-02-12

## 2025-02-12 RX ADMIN — LIDOCAINE: 40 CREAM TOPICAL at 14:00

## 2025-02-12 NOTE — PATIENT INSTRUCTIONS
Orders Placed This Encounter   Procedures    Wound cleansing and dressings Diabetic Ulcer Right;Lateral Ankle     Wound Cleansing and Dressing:  Shower: YES-WITH PROTECTIVE COVER SUCH A CAST COVER OR PLASTIC BAG- DO NOT GET DRESSING WET    Wound:right lateral heel  Apply 1/4 inch felt padding ring to skin around wound   Apply aquacel AG to wound bed as shown  Cover with folded 2 x 2 gauze and then flat 2 x 2 gauze  Secure with tape  Leave dressing in place until Friday and then change dressing every day  Change felt ring on Sunday    Increase protein foods to 3-4 servings per day. Protein foods include all the meats, chicken, fish, dairy products such as milk, eggs, yogurt (especially greek yogurt), cheeses. Other protein foods include hard beans such as black or kidney beans, lentils, edamame, peanut butter and nuts such as almonds, cashews and peanuts.   If your appetite is poor, please consider protein shakes 1-2 times per day such as Ensure Max, Boost, Premier Protein or a similar product.    Follow up Dr. Pearson in 1 week.     Standing Status:   Future     Expiration Date:   2/19/2025

## 2025-02-12 NOTE — PROGRESS NOTES
"Patient ID: Jamie Mujica Jr. is a 49 y.o. male Date of Birth 1975     Chief Complaint  Chief Complaint   Patient presents with    New Patient Visit     Initial visit for diabetic patient who presents with wound of the R lateral heel. Patient reports the wound has been present since January and it began as a blister.Wound became infected and patient went to PCP who gave him antibiotics. He is currently covering the wound with dry gauze dressing daily.       Allergies  Patient has no known allergies.    Assessment:    No problem-specific Assessment & Plan notes found for this encounter.       Diagnoses and all orders for this visit:    Diabetic ulcer of right midfoot associated with type 2 diabetes mellitus, with fat layer exposed (HCC)  -     Wound cleansing and dressings Diabetic Ulcer Right;Lateral Ankle; Future  -     Discontinue: lidocaine (XYLOCAINE) 2 % topical gel; Apply 1 Application topically as needed for mild pain (Apply at each dressing change.  Wash with soap and water prior to re-applying dressing.)  -     Discontinue: lidocaine (XYLOCAINE) 2 % topical gel; Apply 1 Application topically as needed for mild pain (Apply at each dressing change.  Wash with soap and water prior to re-applying dressing.)  -     lidocaine (LMX) 4 % cream  -     Wound Procedure Treatment Diabetic Ulcer Right;Lateral Ankle    Right foot pain    Diabetic polyneuropathy associated with type 2 diabetes mellitus (HCC)    Other orders  -     Debridement              Debridement   Wound 02/12/25 Diabetic Ulcer Ankle Right;Lateral    Universal Protocol:  Consent: Verbal consent obtained.  Risks and benefits: risks, benefits and alternatives were discussed  Consent given by: patient  Time out: Immediately prior to procedure a \"time out\" was called to verify the correct patient, procedure, equipment, support staff and site/side marked as required.  Patient identity confirmed: verbally with patient    Debridement Details  Performed " by: physician  Debridement type: surgical  Level of debridement: subcutaneous tissue  Pain control: lidocaine 4%      Post-debridement measurements  Length (cm): 1.5  Width (cm): 2.1  Depth (cm): 0.3  Percent debrided: 100%  Surface Area (cm^2): 3.15  Area Debrided (cm^2): 3.15  Volume (cm^3): 0.95    Tissue and other material debrided: subcutaneous tissue  Devitalized tissue debrided: biofilm and fibrin  Instrument(s) utilized: blade  Bleeding: small  Hemostasis obtained with: pressure  Procedural pain (0-10): 0  Post-procedural pain: 0   Response to treatment: procedure was tolerated well        Plan:    - pt eval and managed     - Number and complexity of problems addressed: 1 stable chronic illness as shown     - Amount/complexity of data reviewed and analyzed:      Category 1: prior patient notes were analyzed today before evaluating and managing patient. All PMH were discussed with pt today.   - Risk of complications: moderate risk of morbidity from additional testing or treatment involved with this patient, which includes but not limited to:      - discussed anatomy, condition, treatment plan and options. They were instructed on proper foot care. This is total time spent today involving both face-to-face time and non face-to-face time. This time spent includes reviewing their past medical history, performing a medically appropriate examination and evaluation of the patient, counseling and educating the patient,, documenting all findings in EMR, and independently interpreting results and communicating results with patientand discussing their condition and treatment options, risks, and potential complications. I have discussed the findings of this examination with the patient. The discussion included a complete verbal explanation of the examination results, diagnosis and planned treatment(s). A schedule for future care needs was explained. The patient has verbalized the understanding of these instructions at  this time. If any questions should arise after returning home I have encouraged the patient to feel free to call the office.           - wound orders per nursing  - wound was debride today. Consent forme signed. Pt tolerated well  - pt instructed on home dressing changes.   - padding dispensed for pt today to alleviate pressure to the wound area. - we will continue with local wound care.    - I have reached out to patient's PCP for further information     Follow up in 1 week         Wound 02/12/25 Diabetic Ulcer Ankle Right;Lateral (Active)   Enter Aquino score: Aquino Grade 3: Deep abscess, OM, or joint sepsis 02/12/25 1342   Wound Image Images linked 02/12/25 1437   Wound Description Pink;Epithelialization (skin islands present) 02/12/25 1342   Sarah-wound Assessment Maceration;Intact 02/12/25 1342   Wound Length (cm) 1.3 cm 02/12/25 1342   Wound Width (cm) 2 cm 02/12/25 1342   Wound Depth (cm) 0.2 cm 02/12/25 1342   Wound Surface Area (cm^2) 2.6 cm^2 02/12/25 1342   Wound Volume (cm^3) 0.52 cm^3 02/12/25 1342   Calculated Wound Volume (cm^3) 0.52 cm^3 02/12/25 1342   Drainage Amount Small 02/12/25 1342   Drainage Description Brown 02/12/25 1342   Non-staged Wound Description Full thickness 02/12/25 1342       Wound 02/12/25 Diabetic Ulcer Ankle Right;Lateral (Active)   Date First Assessed: 02/12/25   Primary Wound Type: Diabetic Ulcer  Location: Ankle  Wound Location Orientation: Right;Lateral       Subjective:      .    49 year old male presents today with wound to the right heel. Pt states wound has been present for over a month at this time. Pt has been applying topical antibiotic to the area. Pt has pain. Pt states this started as a blister that has popped. Pt has h/o DM. Denies any trauma to the area. Denies n/v/f/c.         The following portions of the patient's history were reviewed and updated as appropriate: allergies, current medications, past family history, past medical history, past social history,  past surgical history, and problem list.    Review of Systems   Constitutional:  Negative for chills and fever.   Gastrointestinal:  Negative for nausea and vomiting.   All other systems reviewed and are negative.        Objective:       Wound 02/12/25 Diabetic Ulcer Ankle Right;Lateral (Active)   Enter Aquino score: Aquino Grade 3: Deep abscess, OM, or joint sepsis 02/12/25 1342   Wound Image Images linked 02/12/25 1437   Wound Description Pink;Epithelialization (skin islands present) 02/12/25 1342   Sarah-wound Assessment Maceration;Intact 02/12/25 1342   Wound Length (cm) 1.3 cm 02/12/25 1342   Wound Width (cm) 2 cm 02/12/25 1342   Wound Depth (cm) 0.2 cm 02/12/25 1342   Wound Surface Area (cm^2) 2.6 cm^2 02/12/25 1342   Wound Volume (cm^3) 0.52 cm^3 02/12/25 1342   Calculated Wound Volume (cm^3) 0.52 cm^3 02/12/25 1342   Drainage Amount Small 02/12/25 1342   Drainage Description Brown 02/12/25 1342   Non-staged Wound Description Full thickness 02/12/25 1342       BP 96/60   Pulse 60   Temp (!) 95.6 °F (35.3 °C)   Resp 18     Physical Exam  Vitals and nursing note reviewed.   Constitutional:       Appearance: Normal appearance. He is normal weight.   Cardiovascular:      Pulses:           Dorsalis pedis pulses are 1+ on the right side and 1+ on the left side.        Posterior tibial pulses are 1+ on the right side and 1+ on the left side.   Musculoskeletal:        Feet:    Feet:      Right foot:      Protective Sensation: 8 sites tested.  6 sites sensed.      Left foot:      Protective Sensation: 8 sites tested.  3 sites sensed.           Wound Instructions:  Orders Placed This Encounter   Procedures    Wound cleansing and dressings Diabetic Ulcer Right;Lateral Ankle     Wound Cleansing and Dressing:  Shower: YES-WITH PROTECTIVE COVER SUCH A CAST COVER OR PLASTIC BAG- DO NOT GET DRESSING WET    Wound:right lateral heel  Apply 1/4 inch felt padding ring to skin around wound   Apply aquacel AG to wound bed as  shown  Cover with folded 2 x 2 gauze and then flat 2 x 2 gauze  Secure with tape  Leave dressing in place until Friday and then change dressing every day  Change felt ring on Sunday    Increase protein foods to 3-4 servings per day. Protein foods include all the meats, chicken, fish, dairy products such as milk, eggs, yogurt (especially greek yogurt), cheeses. Other protein foods include hard beans such as black or kidney beans, lentils, edamame, peanut butter and nuts such as almonds, cashews and peanuts.   If your appetite is poor, please consider protein shakes 1-2 times per day such as Ensure Max, Boost, Premier Protein or a similar product.    Follow up Dr. Pearson in 1 week.     Standing Status:   Future     Expiration Date:   2/19/2025    Wound Procedure Treatment Diabetic Ulcer Right;Lateral Ankle     This order was created via procedure documentation    Debridement     This order was created via procedure documentation        Diagnosis ICD-10-CM Associated Orders   1. Diabetic ulcer of right midfoot associated with type 2 diabetes mellitus, with fat layer exposed ([facility])  E11.621 Wound cleansing and dressings Diabetic Ulcer Right;Lateral Ankle    L97.412 lidocaine (LMX) 4 % cream     Wound Procedure Treatment Diabetic Ulcer Right;Lateral Ankle      2. Right foot pain  M79.671       3. Diabetic polyneuropathy associated with type 2 diabetes mellitus ([facility])  E11.42

## 2025-02-12 NOTE — PROGRESS NOTES
Wound Procedure Treatment Diabetic Ulcer Right;Lateral Ankle    Performed by: Loren Horne RN  Authorized by: Dmitriy Pearson DPM    Associated wounds:   Wound 02/12/25 Diabetic Ulcer Ankle Right;Lateral  Wound cleansed with:  NSS  Applied to periwound:  Skin prep  Applied primary dressing:  Other  Applied secondary dressing:  Gauze  Dressing secured with:  Tape  Offloading device appllied:  Felt padding 1/4 inch  Comments:  Aquacel AG applied to wound bed

## 2025-02-17 ENCOUNTER — HOSPITAL ENCOUNTER (EMERGENCY)
Facility: HOSPITAL | Age: 50
Discharge: HOME | End: 2025-02-17
Attending: EMERGENCY MEDICINE
Payer: COMMERCIAL

## 2025-02-17 ENCOUNTER — APPOINTMENT (EMERGENCY)
Dept: RADIOLOGY | Facility: HOSPITAL | Age: 50
End: 2025-02-17
Payer: COMMERCIAL

## 2025-02-17 VITALS
SYSTOLIC BLOOD PRESSURE: 163 MMHG | HEART RATE: 94 BPM | TEMPERATURE: 98.4 F | RESPIRATION RATE: 18 BRPM | OXYGEN SATURATION: 95 % | DIASTOLIC BLOOD PRESSURE: 95 MMHG

## 2025-02-17 DIAGNOSIS — R00.2 PALPITATIONS: ICD-10-CM

## 2025-02-17 DIAGNOSIS — F19.90 DRUG USE: Primary | ICD-10-CM

## 2025-02-17 DIAGNOSIS — F41.9 ANXIETY: ICD-10-CM

## 2025-02-17 LAB
2HR DELTA HS TROPONIN: -1 NG/L
ALBUMIN SERPL BCG-MCNC: 4.5 G/DL (ref 3.5–5)
ALP SERPL-CCNC: 66 U/L (ref 34–104)
ALT SERPL W P-5'-P-CCNC: 73 U/L (ref 7–52)
ANION GAP SERPL CALCULATED.3IONS-SCNC: 9 MMOL/L (ref 4–13)
AST SERPL W P-5'-P-CCNC: 31 U/L (ref 13–39)
ATRIAL RATE: 91 BPM
ATRIAL RATE: 95 BPM
BASOPHILS # BLD AUTO: 0.04 THOUSANDS/ΜL (ref 0–0.1)
BASOPHILS NFR BLD AUTO: 0 % (ref 0–1)
BILIRUB SERPL-MCNC: 0.86 MG/DL (ref 0.2–1)
BUN SERPL-MCNC: 14 MG/DL (ref 5–25)
CALCIUM SERPL-MCNC: 10.4 MG/DL (ref 8.4–10.2)
CARDIAC TROPONIN I PNL SERPL HS: 10 NG/L (ref ?–50)
CARDIAC TROPONIN I PNL SERPL HS: 11 NG/L (ref ?–50)
CHLORIDE SERPL-SCNC: 102 MMOL/L (ref 96–108)
CK SERPL-CCNC: 483 U/L (ref 39–308)
CO2 SERPL-SCNC: 26 MMOL/L (ref 21–32)
CREAT SERPL-MCNC: 0.68 MG/DL (ref 0.6–1.3)
EOSINOPHIL # BLD AUTO: 0.07 THOUSAND/ΜL (ref 0–0.61)
EOSINOPHIL NFR BLD AUTO: 1 % (ref 0–6)
ERYTHROCYTE [DISTWIDTH] IN BLOOD BY AUTOMATED COUNT: 11.4 % (ref 11.6–15.1)
GFR SERPL CREATININE-BSD FRML MDRD: 113 ML/MIN/1.73SQ M
GLUCOSE SERPL-MCNC: 245 MG/DL (ref 65–140)
HCT VFR BLD AUTO: 45.2 % (ref 36.5–49.3)
HGB BLD-MCNC: 15.9 G/DL (ref 12–17)
IMM GRANULOCYTES # BLD AUTO: 0.05 THOUSAND/UL (ref 0–0.2)
IMM GRANULOCYTES NFR BLD AUTO: 0 % (ref 0–2)
LYMPHOCYTES # BLD AUTO: 1.17 THOUSANDS/ΜL (ref 0.6–4.47)
LYMPHOCYTES NFR BLD AUTO: 9 % (ref 14–44)
MCH RBC QN AUTO: 33.9 PG (ref 26.8–34.3)
MCHC RBC AUTO-ENTMCNC: 35.2 G/DL (ref 31.4–37.4)
MCV RBC AUTO: 96 FL (ref 82–98)
MONOCYTES # BLD AUTO: 0.85 THOUSAND/ΜL (ref 0.17–1.22)
MONOCYTES NFR BLD AUTO: 7 % (ref 4–12)
NEUTROPHILS # BLD AUTO: 10.89 THOUSANDS/ΜL (ref 1.85–7.62)
NEUTS SEG NFR BLD AUTO: 83 % (ref 43–75)
NRBC BLD AUTO-RTO: 0 /100 WBCS
P AXIS: 73 DEGREES
P AXIS: 75 DEGREES
PLATELET # BLD AUTO: 212 THOUSANDS/UL (ref 149–390)
PMV BLD AUTO: 10.5 FL (ref 8.9–12.7)
POTASSIUM SERPL-SCNC: 3.8 MMOL/L (ref 3.5–5.3)
PR INTERVAL: 152 MS
PR INTERVAL: 164 MS
PROT SERPL-MCNC: 6.8 G/DL (ref 6.4–8.4)
QRS AXIS: 75 DEGREES
QRS AXIS: 78 DEGREES
QRSD INTERVAL: 80 MS
QRSD INTERVAL: 86 MS
QT INTERVAL: 346 MS
QT INTERVAL: 354 MS
QTC INTERVAL: 434 MS
QTC INTERVAL: 435 MS
RBC # BLD AUTO: 4.69 MILLION/UL (ref 3.88–5.62)
SODIUM SERPL-SCNC: 137 MMOL/L (ref 135–147)
T WAVE AXIS: 48 DEGREES
T WAVE AXIS: 56 DEGREES
VENTRICULAR RATE: 91 BPM
VENTRICULAR RATE: 95 BPM
WBC # BLD AUTO: 13.07 THOUSAND/UL (ref 4.31–10.16)

## 2025-02-17 PROCEDURE — 71046 X-RAY EXAM CHEST 2 VIEWS: CPT

## 2025-02-17 PROCEDURE — 96374 THER/PROPH/DIAG INJ IV PUSH: CPT

## 2025-02-17 PROCEDURE — 93005 ELECTROCARDIOGRAM TRACING: CPT

## 2025-02-17 PROCEDURE — 84484 ASSAY OF TROPONIN QUANT: CPT

## 2025-02-17 PROCEDURE — 93010 ELECTROCARDIOGRAM REPORT: CPT | Performed by: STUDENT IN AN ORGANIZED HEALTH CARE EDUCATION/TRAINING PROGRAM

## 2025-02-17 PROCEDURE — 36415 COLL VENOUS BLD VENIPUNCTURE: CPT

## 2025-02-17 PROCEDURE — 82550 ASSAY OF CK (CPK): CPT

## 2025-02-17 PROCEDURE — 85025 COMPLETE CBC W/AUTO DIFF WBC: CPT

## 2025-02-17 PROCEDURE — 99285 EMERGENCY DEPT VISIT HI MDM: CPT

## 2025-02-17 PROCEDURE — 80053 COMPREHEN METABOLIC PANEL: CPT

## 2025-02-17 RX ORDER — LORAZEPAM 2 MG/ML
2 INJECTION INTRAMUSCULAR ONCE
Status: COMPLETED | OUTPATIENT
Start: 2025-02-17 | End: 2025-02-17

## 2025-02-17 RX ADMIN — LORAZEPAM 2 MG: 2 INJECTION INTRAMUSCULAR; INTRAVENOUS at 02:59

## 2025-02-17 NOTE — ED NOTES
Pt came back with empty urine cup, RN scanned prior to seeing empty specimen cup.  Pt reports going but forgot to go in specimen cup.      Camille Reeves RN  02/17/25 1849

## 2025-02-17 NOTE — Clinical Note
Jamie Mujica was seen and treated in our emergency department on 2/17/2025.                Diagnosis:     Jamie  may return to work on return date.    He may return on this date: 02/18/2025         If you have any questions or concerns, please don't hesitate to call.      Julieta Kohli PA-C    ______________________________           _______________          _______________  Hospital Representative                              Date                                Time

## 2025-02-17 NOTE — DISCHARGE INSTRUCTIONS
Follow-up with PCP.  Return to ED if symptoms worsen, fail to improve, or develop as listed in follow-up section.  Symptomatic care as discussed.  
JENIFFER Beebe

## 2025-02-17 NOTE — ED PROVIDER NOTES
"Time reflects when diagnosis was documented in both MDM as applicable and the Disposition within this note       Time User Action Codes Description Comment    2/17/2025  5:03 AM Julieta Kohli Add [F19.90] Drug use     2/17/2025  5:05 AM Julieta Kohli Add [R00.2] Palpitations     2/17/2025  5:05 AM Julieta Kohli Add [F41.9] Anxiety           ED Disposition       ED Disposition   Discharge    Condition   Stable    Date/Time   Mon Feb 17, 2025  5:07 AM    Comment   Jamie Mujica Jr. discharge to home/self care.                   Assessment & Plan       Medical Decision Making  49-year-old male presenting with sensation of palpitations occurring prior to arrival.  Patient took ecstasy on Friday and has not slept since.  States he has felt anxious and nervous.  Was trying to sleep tonight and said he was getting anxious, felt hot, and began experiencing his heart feeling \"jittery.\" Called ambulance to be seen.   On physical examination, patient is well-appearing in no acute distress.  Patient appears anxious.  PERRL. EOM intact. Patient neurologically intact. Abdomen completely non-tender to palpation. Lungs clear to auscultation.     Plan: CBC, CMP, troponin, CK, EKG, chest x-ray, ativan and fluids.   EKGs as interpreted in ED course.  WBC 13.07.  Glucose 245, similar to previous.  . ALT 73, similar to previous. Na 137. Corrected sodium 139.   Chest x-ray revealed no acute cardiopulmonary abnormalities as interpreted by me.  0-hour troponin 11.  Delta troponin -1.  Patient states he is feeling better and is ready for discharge.  States that he fell asleep while here.  Vitals stable. Patient stable for discharge.   Discussed case with Dr. Rosen.     Discussed findings from the visit with the patient.  We had a conversation regarding supportive care and indications for return.  Recommended appropriate follow-up.  Patient and/or family understand and agree with plan.    Portions of the record may have been created " "with voice recognition software. Occasional use of the incorrect word or \"sound a like\" substitutions may have occurred due to the inherent limitations of voice recognition software. Read the chart carefully and recognize, using context, where substitutions have occurred.         Amount and/or Complexity of Data Reviewed  Labs: ordered.  Radiology: independent interpretation performed.    Risk  Prescription drug management.        ED Course as of 02/17/25 0753   Mon Feb 17, 2025   0240 EKG Interpretation    Rate: 91 BPM  Rhythm: BSR  Axis: Normal  Intervals: Normal, no blocks, QTc 435ms  T waves: Normal  ST segments: No ST elevations or depressions    Impression: Normal EKG. EKG for comparison: Similar  EKG interpreted by me.      0400 EKG Interpretation    Rate: 95 BPM  Rhythm: NSR  Axis: Normal  Intervals: Normal, no blocks, QTc 434ms  T waves: non-specific  ST segments: No ST elevations or depressions    Impression: Normal EKG. EKG for comparison: Similar  EKG interpreted by me.          Medications   LORazepam (ATIVAN) injection 2 mg (2 mg Intravenous Given 2/17/25 0259)       ED Risk Strat Scores   HEART Risk Score      Flowsheet Row Most Recent Value   Heart Score Risk Calculator    History 0 Filed at: 02/17/2025 0504   ECG 0 Filed at: 02/17/2025 0504   Age 1 Filed at: 02/17/2025 0504   Risk Factors 1 Filed at: 02/17/2025 0504   Troponin 0 Filed at: 02/17/2025 0504   HEART Score 2 Filed at: 02/17/2025 0504          HEART Risk Score      Flowsheet Row Most Recent Value   Heart Score Risk Calculator    History 0 Filed at: 02/17/2025 0504   ECG 0 Filed at: 02/17/2025 0504   Age 1 Filed at: 02/17/2025 0504   Risk Factors 1 Filed at: 02/17/2025 0504   Troponin 0 Filed at: 02/17/2025 0504   HEART Score 2 Filed at: 02/17/2025 0504                              SBIRT 20yo+      Flowsheet Row Most Recent Value   Initial Alcohol Screen: US AUDIT-C     1. How often do you have a drink containing alcohol? 0 Filed at: " "02/17/2025 0242   2. How many drinks containing alcohol do you have on a typical day you are drinking?  0 Filed at: 02/17/2025 0242   3a. Male UNDER 65: How often do you have five or more drinks on one occasion? 0 Filed at: 02/17/2025 0242   Audit-C Score 0 Filed at: 02/17/2025 0242   MINA: How many times in the past year have you...    Used an illegal drug or used a prescription medication for non-medical reasons? Once or Twice Filed at: 02/17/2025 0211   DAST-10: In the past 12 months...    1. Have you used drugs other than those required for medical reasons? 0 Filed at: 02/17/2025 0211   2. Do you use more than one drug at a time? 0 Filed at: 02/17/2025 0211   3. Have you had medical problems as a result of your drug use (e.g., memory loss, hepatitis, convulsions, bleeding, etc.)? 0 Filed at: 02/17/2025 0211   4. Have you had \"blackouts\" or \"flashbacks\" as a result of drug use?YesNo 0 Filed at: 02/17/2025 0211   5. Do you ever feel bad or guilty about your drug use? 0 Filed at: 02/17/2025 0211   6. Does your spouse (or parent) ever complain about your involvement with drugs? 0 Filed at: 02/17/2025 0211   7. Have you neglected your family because of your use of drugs? 0 Filed at: 02/17/2025 0211   8. Have you engaged in illegal activities in order to obtain drugs? 0 Filed at: 02/17/2025 0211   9. Have you ever experienced withdrawal symptoms (felt sick) when you stopped taking drugs? 0 Filed at: 02/17/2025 0211   10. Are you always able to stop using drugs when you want to? 0 Filed at: 02/17/2025 0211   DAST-10 Score 0 Filed at: 02/17/2025 0211                            History of Present Illness       Chief Complaint   Patient presents with    Palpitations     Pt took ecstasy 2 days ago and is now having palpitatioms, \"tingling\" and feels weird. Pt reports he last slept on Friday night.       Past Medical History:   Diagnosis Date    Abscess of lower leg 08/23/2020    Management per cellulitis above.    Back " "muscle spasm 10/21/2021    Dental abscess 2022    Diabetes mellitus (HCC)     Elbow laceration, left, initial encounter 2021    Foot pain, bilateral 2018    GERD (gastroesophageal reflux disease) 2019    Presentation suspicious for GERD Will admister 8 week trial of PPi and workup as needed.    Rheumatoid arthritis (HCC)     Venous stasis dermatitis of left lower extremity 2019      No past surgical history on file.   Family History   Problem Relation Age of Onset    Mental illness Mother     Hypertension Mother     Diabetes Mother     Diabetes Father       Social History     Tobacco Use    Smoking status: Former     Current packs/day: 0.00     Types: Cigarettes     Quit date: 2013     Years since quittin.4    Smokeless tobacco: Never   Vaping Use    Vaping status: Former    Substances: Nicotine, THC, Flavoring   Substance Use Topics    Alcohol use: Not Currently    Drug use: Yes     Types: Marijuana      E-Cigarette/Vaping    E-Cigarette Use Former User       E-Cigarette/Vaping Substances    Nicotine Yes     THC Yes     CBD No     Flavoring Yes     Other No     Unknown No       I have reviewed and agree with the history as documented.     Patient is a 49-year-old male presenting with palpitations starting prior to arrival.  Patient states he took ecstasy Friday night and since has not been able to sleep.  States he has been feeling anxious and nervous.  States he has anxiety medication that he takes as needed, states he took escitalopram a few hours ago.  Tonight he felt sweaty, anxious, felt like his heart was \"jittery,\" and like his chest was tight.  States when he went outside he felt nauseous.  Denies chest pain, shortness of breath, abdominal pain, vomiting, fevers, chills, numbness/tingling, weakness, lightheaded/dizziness, vision changes, headache, etc.       Palpitations  Associated symptoms: no back pain, no chest pain, no cough, no dizziness, no nausea, no numbness, " no shortness of breath, no vomiting and no weakness        Review of Systems   Constitutional:  Negative for chills and fever.   HENT:  Negative for congestion, ear pain, rhinorrhea, sore throat, trouble swallowing and voice change.    Eyes:  Negative for pain and visual disturbance.   Respiratory:  Positive for chest tightness. Negative for cough and shortness of breath.    Cardiovascular:  Positive for palpitations. Negative for chest pain.   Gastrointestinal:  Negative for abdominal pain, blood in stool, constipation, diarrhea, nausea and vomiting.   Genitourinary:  Negative for dysuria, flank pain, hematuria and urgency.   Musculoskeletal:  Negative for arthralgias, back pain, neck pain and neck stiffness.   Skin:  Negative for color change and rash.   Neurological:  Negative for dizziness, seizures, syncope, weakness, light-headedness, numbness and headaches.   All other systems reviewed and are negative.          Objective       ED Triage Vitals   Temperature Pulse Blood Pressure Respirations SpO2 Patient Position - Orthostatic VS   02/17/25 0223 02/17/25 0209 02/17/25 0209 02/17/25 0209 02/17/25 0209 02/17/25 0209   98.4 °F (36.9 °C) 85 (!) 191/98 18 97 % Lying      Temp Source Heart Rate Source BP Location FiO2 (%) Pain Score    02/17/25 0223 02/17/25 0209 02/17/25 0209 -- 02/17/25 0328    Oral Monitor Right arm  No Pain      Vitals      Date and Time Temp Pulse SpO2 Resp BP Pain Score FACES Pain Rating User   02/17/25 0430 -- 94 95 % 18 163/95 No Pain --    02/17/25 0400 -- -- -- -- -- No Pain --    02/17/25 0330 -- 95 96 % 20 162/89 -- --    02/17/25 0328 -- -- -- -- -- No Pain --    02/17/25 0300 -- 88 97 % 20 167/104 -- -- AS   02/17/25 0230 -- 88 97 % 21 151/85 -- -- AS   02/17/25 0223 98.4 °F (36.9 °C) -- -- -- -- -- -- AS   02/17/25 0209 -- 85 97 % 18 191/98 -- -- AS            Physical Exam  Vitals and nursing note reviewed.   Constitutional:       General: He is not in acute distress.      Appearance: He is well-developed. He is not ill-appearing, toxic-appearing or diaphoretic.   HENT:      Head: Normocephalic and atraumatic.      Right Ear: External ear normal.      Left Ear: External ear normal.      Nose: Nose normal.      Mouth/Throat:      Mouth: Mucous membranes are moist.      Pharynx: No oropharyngeal exudate or posterior oropharyngeal erythema.   Eyes:      General: No scleral icterus.        Right eye: No discharge.         Left eye: No discharge.      Extraocular Movements: Extraocular movements intact.      Conjunctiva/sclera: Conjunctivae normal.      Pupils: Pupils are equal, round, and reactive to light.   Cardiovascular:      Rate and Rhythm: Normal rate and regular rhythm.      Pulses: Normal pulses.      Heart sounds: Normal heart sounds. No murmur heard.  Pulmonary:      Effort: Pulmonary effort is normal. No respiratory distress.      Breath sounds: Normal breath sounds. No stridor. No wheezing, rhonchi or rales.   Chest:      Chest wall: No tenderness.   Abdominal:      Palpations: Abdomen is soft.      Tenderness: There is no abdominal tenderness. There is no guarding or rebound.   Musculoskeletal:         General: No swelling.      Cervical back: Normal range of motion and neck supple.   Skin:     General: Skin is warm and dry.      Capillary Refill: Capillary refill takes less than 2 seconds.      Coloration: Skin is not jaundiced or pale.      Findings: No bruising, erythema, lesion or rash.   Neurological:      General: No focal deficit present.      Mental Status: He is alert and oriented to person, place, and time.   Psychiatric:         Mood and Affect: Mood is anxious.         Results Reviewed       Procedure Component Value Units Date/Time    HS Troponin I 2hr [[id number]]  (Normal) Collected: 02/17/25 0356    Lab Status: Final result Specimen: Blood from Arm, Left Updated: 02/17/25 0428     hs TnI 2hr 10 ng/L      Delta 2hr hsTnI -1 ng/L     CK [[id number]]  (Abnormal)  Collected: 02/17/25 0213    Lab Status: Final result Specimen: Blood from Arm, Left Updated: 02/17/25 0311     Total  U/L     HS Troponin 0hr (reflex protocol) [[id number]]  (Normal) Collected: 02/17/25 0213    Lab Status: Final result Specimen: Blood from Arm, Left Updated: 02/17/25 0241     hs TnI 0hr 11 ng/L     Comprehensive metabolic panel [[id number]]  (Abnormal) Collected: 02/17/25 0213    Lab Status: Final result Specimen: Blood from Arm, Left Updated: 02/17/25 0233     Sodium 137 mmol/L      Potassium 3.8 mmol/L      Chloride 102 mmol/L      CO2 26 mmol/L      ANION GAP 9 mmol/L      BUN 14 mg/dL      Creatinine 0.68 mg/dL      Glucose 245 mg/dL      Calcium 10.4 mg/dL      AST 31 U/L      ALT 73 U/L      Alkaline Phosphatase 66 U/L      Total Protein 6.8 g/dL      Albumin 4.5 g/dL      Total Bilirubin 0.86 mg/dL      eGFR 113 ml/min/1.73sq m     Narrative:      [employer] guidelines for Chronic Kidney Disease (CKD):     Stage 1 with normal or high GFR (GFR > 90 mL/min/1.73 square meters)    Stage 2 Mild CKD (GFR = 60-89 mL/min/1.73 square meters)    Stage 3A Moderate CKD (GFR = 45-59 mL/min/1.73 square meters)    Stage 3B Moderate CKD (GFR = 30-44 mL/min/1.73 square meters)    Stage 4 Severe CKD (GFR = 15-29 mL/min/1.73 square meters)    Stage 5 End Stage CKD (GFR <15 mL/min/1.73 square meters)  Note: GFR calculation is accurate only with a steady state creatinine    CBC and differential [[id number]]  (Abnormal) Collected: 02/17/25 0213    Lab Status: Final result Specimen: Blood from Arm, Left Updated: 02/17/25 0219     WBC 13.07 Thousand/uL      RBC 4.69 Million/uL      Hemoglobin 15.9 g/dL      Hematocrit 45.2 %      MCV 96 fL      MCH 33.9 pg      MCHC 35.2 g/dL      RDW 11.4 %      MPV 10.5 fL      Platelets 212 Thousands/uL      nRBC 0 /100 WBCs      Segmented % 83 %      Immature Grans % 0 %      Lymphocytes % 9 %      Monocytes % 7 %      Eosinophils Relative 1 %       Basophils Relative 0 %      Absolute Neutrophils 10.89 Thousands/µL      Absolute Immature Grans 0.05 Thousand/uL      Absolute Lymphocytes 1.17 Thousands/µL      Absolute Monocytes 0.85 Thousand/µL      Eosinophils Absolute 0.07 Thousand/µL      Basophils Absolute 0.04 Thousands/µL             XR chest 2 views   ED Interpretation by Julieta Kohli PA-C ( [id number])   No acute cardiopulmonary abnormalities as interpreted by me.          Procedures    ED Medication and Procedure Management   Prior to Admission Medications   Prescriptions Last Dose Informant Patient Reported? Taking?   Accu-Chek FastClix Lancets MISC   No No   Sig: Use 2 (two) times a day   Patient not taking: Reported on 2025   Actemra ACTPen 162 MG/0.9ML SOAJ Past Month  Yes Yes   Sig: Inject 162 mg as directed every 14 (fourteen) days   Blood Glucose Monitoring Suppl (Accu-Chek Guide) w/Device KIT   No No   Sig: Use 2 (two) times a day   Patient not taking: Reported on 2025   Diclofenac Sodium (VOLTAREN) 1 %   No No   Sig: Apply 2 g topically 3 (three) times a day as needed (foot pain)   Januvia 100 MG tablet 2025  No Yes   Sig: take 1 tablet by mouth once daily   QUEtiapine (SEROquel) 300 mg tablet More than a month  Yes No   Sig: Take by mouth daily as needed   albuterol (Ventolin HFA) 90 mcg/act inhaler   No No   Sig: Inhale 2 puffs every 6 (six) hours as needed for wheezing   celecoxib (CeleBREX) 200 mg capsule   No No   Sig: Take 1 capsule (200 mg total) by mouth 2 (two) times a day   cholecalciferol (VITAMIN D3) 1,000 units tablet Past Week  No Yes   Sig: Take 2 tablets (2,000 Units total) by mouth daily   ergocalciferol (VITAMIN D2) 50,000 units 2025  No Yes   Sig: take 1 capsule by mouth every week   escitalopram (LEXAPRO) 10 mg tablet Not Taking  No No   Sig: take 1 tablet by mouth once daily   Patient not taking: Reported on 2025   fluticasone (FLONASE) 50 mcg/act nasal spray   No No   Si spray into each  nostril daily   glucose blood (Accu-Chek Guide) test strip   No No   Sig: Use as instructed   leflunomide (ARAVA) 20 MG tablet Not Taking  Yes No   Sig: Take 20 mg by mouth daily   Patient not taking: Reported on 2/17/2025   loratadine (CLARITIN) 10 mg tablet Not Taking  No No   Sig: Take 1 tablet (10 mg total) by mouth daily   Patient not taking: Reported on 2/17/2025   metFORMIN (GLUCOPHAGE) 1000 MG tablet 2/16/2025  No Yes   Sig: take 1 tablet by mouth twice a day with meals   minoxidil (ROGAINE) 2 % external solution Not Taking  No No   Sig: Apply topically 2 (two) times a day   Patient not taking: Reported on 2/17/2025   propranolol (INDERAL) 10 mg tablet 2/16/2025  Yes Yes   rosuvastatin (CRESTOR) 10 MG tablet 2/16/2025  No Yes   Sig: Take 1 tablet (10 mg total) by mouth daily   urea (CARMOL) 10 % cream Not Taking  No No   Sig: Apply topically as needed for dry skin   Patient not taking: Reported on 2/17/2025      Facility-Administered Medications: None     Discharge Medication List as of 2/17/2025  5:07 AM        CONTINUE these medications which have NOT CHANGED    Details   Actemra ACTPen 162 MG/0.9ML SOAJ Inject 162 mg as directed every 14 (fourteen) days, Starting Thu 3/14/2024, Historical Med      cholecalciferol (VITAMIN D3) 1,000 units tablet Take 2 tablets (2,000 Units total) by mouth daily, Starting Wed 1/3/2024, Normal      ergocalciferol (VITAMIN D2) 50,000 units take 1 capsule by mouth every week, Normal      Januvia 100 MG tablet take 1 tablet by mouth once daily, Starting Thu 9/26/2024, Normal      metFORMIN (GLUCOPHAGE) 1000 MG tablet take 1 tablet by mouth twice a day with meals, Starting Mon 1/6/2025, Normal      propranolol (INDERAL) 10 mg tablet Starting Thu 7/2/2020, Historical Med      rosuvastatin (CRESTOR) 10 MG tablet Take 1 tablet (10 mg total) by mouth daily, Starting Wed 10/16/2024, Normal      Accu-Chek FastClix Lancets MISC Use 2 (two) times a day, Starting Tue 6/7/2022, Normal       albuterol (Ventolin HFA) 90 mcg/act inhaler Inhale 2 puffs every 6 (six) hours as needed for wheezing, Starting Wed 9/18/2024, Normal      Blood Glucose Monitoring Suppl (Accu-Chek Guide) w/Device KIT Use 2 (two) times a day, Starting Tue 6/7/2022, Normal      celecoxib (CeleBREX) 200 mg capsule Take 1 capsule (200 mg total) by mouth 2 (two) times a day, Starting Mon 2/10/2025, Normal      Diclofenac Sodium (VOLTAREN) 1 % Apply 2 g topically 3 (three) times a day as needed (foot pain), Starting Wed 4/3/2024, Normal      escitalopram (LEXAPRO) 10 mg tablet take 1 tablet by mouth once daily, Starting Fri 10/11/2024, Normal      fluticasone (FLONASE) 50 mcg/act nasal spray 1 spray into each nostril daily, Starting Wed 12/7/2022, Normal      glucose blood (Accu-Chek Guide) test strip Use as instructed, Normal      leflunomide (ARAVA) 20 MG tablet Take 20 mg by mouth daily, Starting Thu 9/5/2024, Historical Med      loratadine (CLARITIN) 10 mg tablet Take 1 tablet (10 mg total) by mouth daily, Starting Thu 2/16/2023, Normal      minoxidil (ROGAINE) 2 % external solution Apply topically 2 (two) times a day, Starting Wed 1/3/2024, Normal      QUEtiapine (SEROquel) 300 mg tablet Take by mouth daily as needed, Starting Wed 8/21/2019, Historical Med      urea (CARMOL) 10 % cream Apply topically as needed for dry skin, Starting Mon 2/10/2025, Normal           No discharge procedures on file.  ED SEPSIS DOCUMENTATION   Time reflects when diagnosis was documented in both MDM as applicable and the Disposition within this note       Time User Action Codes Description Comment    2/17/2025  5:03 AM Julieta Kohli Add [F19.90] Drug use     2/17/2025  5:05 AM Julieta Kohli Add [R00.2] Palpitations     2/17/2025  5:05 AM Julieta Kohli [F41.9] Anxiety                  Julieta Kohli PA-C  02/17/25 0754

## 2025-02-19 ENCOUNTER — OFFICE VISIT (OUTPATIENT)
Dept: WOUND CARE | Facility: CLINIC | Age: 50
End: 2025-02-19
Payer: COMMERCIAL

## 2025-02-19 VITALS
HEART RATE: 84 BPM | DIASTOLIC BLOOD PRESSURE: 86 MMHG | SYSTOLIC BLOOD PRESSURE: 144 MMHG | TEMPERATURE: 97.6 F | RESPIRATION RATE: 18 BRPM

## 2025-02-19 DIAGNOSIS — L85.3 XEROSIS CUTIS: ICD-10-CM

## 2025-02-19 DIAGNOSIS — E11.621 DIABETIC ULCER OF RIGHT MIDFOOT ASSOCIATED WITH TYPE 2 DIABETES MELLITUS, WITH FAT LAYER EXPOSED (HCC): Primary | ICD-10-CM

## 2025-02-19 DIAGNOSIS — L97.412 DIABETIC ULCER OF RIGHT MIDFOOT ASSOCIATED WITH TYPE 2 DIABETES MELLITUS, WITH FAT LAYER EXPOSED (HCC): Primary | ICD-10-CM

## 2025-02-19 PROCEDURE — 97597 DBRDMT OPN WND 1ST 20 CM/<: CPT | Performed by: PODIATRIST

## 2025-02-19 RX ORDER — LIDOCAINE 40 MG/G
CREAM TOPICAL ONCE
Status: COMPLETED | OUTPATIENT
Start: 2025-02-19 | End: 2025-02-19

## 2025-02-19 RX ORDER — SILVER SULFADIAZINE 10 MG/G
CREAM TOPICAL DAILY
Qty: 50 G | Refills: 2 | Status: SHIPPED | OUTPATIENT
Start: 2025-02-19

## 2025-02-19 RX ADMIN — LIDOCAINE: 40 CREAM TOPICAL at 14:40

## 2025-02-19 NOTE — PROGRESS NOTES
Wound Procedure Treatment Diabetic Ulcer Right;Lateral Ankle    Performed by: Rosario Lara RN  Authorized by: Dmitriy Pearson DPM  Associated wounds:   Wound 02/12/25 Diabetic Ulcer Ankle Right;Lateral    Wound cleansed with:  Wound aggrssively cleansed with NSS and gauze   Applied primary dressing:  Silver and Levafiber   Applied secondary dressing:  Gauze   Dressing secured with:  Stephanie and Tape

## 2025-02-19 NOTE — PATIENT INSTRUCTIONS
Orders Placed This Encounter   Procedures    Wound cleansing and dressings Diabetic Ulcer Right;Lateral Ankle     Wound Cleansing and Dressing:  Shower: YES-WITH PROTECTIVE COVER SUCH A CAST COVER OR PLASTIC BAG- DO NOT GET DRESSING WET     Wound:right lateral heel  Apply 1/4 inch felt padding ring to skin around wound   Apply aquacel AG to wound bed as shown  Cover with folded 2 x 2 gauze and then flat 2 x 2 gauze  Secure with tape  Leave dressing in place until Friday and then change dressing every day  Change felt ring on Sunday     Increase protein foods to 3-4 servings per day.   Protein foods include all the meats, chicken, fish, dairy products such as milk, eggs, yogurt (especially greek yogurt), cheeses.   Other protein foods include hard beans such as black or kidney beans, lentils, edamame, peanut butter and nuts such as almonds, cashews and peanuts.   If your appetite is poor, please consider protein shakes 1-2 times per day such as Ensure Max, Boost, Premier Protein or a similar product.     Follow up Dr. Pearson in 1 week.     Standing Status:   Future     Expiration Date:   2/26/2025    Debridement Diabetic Ulcer Right;Lateral Ankle     This order was created via procedure documentation    Wound Procedure Treatment Diabetic Ulcer Right;Lateral Ankle     This order was created via procedure documentation

## 2025-02-19 NOTE — PROGRESS NOTES
"Patient ID: Jamie Mujica Jr. is a 49 y.o. male Date of Birth 1975     Chief Complaint  Chief Complaint   Patient presents with    Follow Up Wound Care Visit     Follow up visit for wound to right foot.  Pt denies any issues or concerns since last visit.        Allergies  Patient has no known allergies.    Assessment:    No problem-specific Assessment & Plan notes found for this encounter.       Diagnoses and all orders for this visit:    Diabetic ulcer of right midfoot associated with type 2 diabetes mellitus, with fat layer exposed (HCC)  -     lidocaine (LMX) 4 % cream  -     Wound cleansing and dressings Diabetic Ulcer Right;Lateral Ankle; Future    Xerosis cutis  -     silver sulfadiazine (SILVADENE,SSD) 1 % cream; Apply topically daily To all areas    Other orders  -     Debridement              Debridement   Wound 02/12/25 Diabetic Ulcer Ankle Right;Lateral    Universal Protocol:  Consent: Verbal consent obtained.  Risks and benefits: risks, benefits and alternatives were discussed  Consent given by: patient  Time out: Immediately prior to procedure a \"time out\" was called to verify the correct patient, procedure, equipment, support staff and site/side marked as required.  Patient identity confirmed: verbally with patient    Debridement Details  Performed by: physician  Debridement type: selective  Pain control: lidocaine 4%      Post-debridement measurements  Length (cm): 1.1  Width (cm): 1.2  Depth (cm): 0.1  Percent debrided: 100%  Surface Area (cm^2): 1.32  Area Debrided (cm^2): 1.32  Volume (cm^3): 0.13    Devitalized tissue debrided: biofilm and fibrin  Instrument(s) utilized: blade  Bleeding: small  Hemostasis obtained with: pressure  Procedural pain (0-10): 0  Post-procedural pain: 0   Response to treatment: procedure was tolerated well        Plan:    - pt eval and managed     - Number and complexity of problems addressed: 1 stable chronic illness as shown     - Amount/complexity of data " reviewed and analyzed:      Category 1: prior patient notes were analyzed today before evaluating and managing patient. All PMH were discussed with pt today.   - Risk of complications: moderate risk of morbidity from additional testing or treatment involved with this patient, which includes but not limited to:      - discussed anatomy, condition, treatment plan and options. They were instructed on proper foot care. This is total time spent today involving both face-to-face time and non face-to-face time. This time spent includes reviewing their past medical history, performing a medically appropriate examination and evaluation of the patient, counseling and educating the patient,, documenting all findings in EMR, and independently interpreting results and communicating results with patientand discussing their condition and treatment options, risks, and potential complications. I have discussed the findings of this examination with the patient. The discussion included a complete verbal explanation of the examination results, diagnosis and planned treatment(s). A schedule for future care needs was explained. The patient has verbalized the understanding of these instructions at this time. If any questions should arise after returning home I have encouraged the patient to feel free to call the office.           - wound orders per nursing  - wound was debride today. Consent forme signed. Pt tolerated well  - pt instructed on home dressing changes.   - padding dispensed for pt today to alleviate pressure to the wound area. - we will continue with local wound care.    - I have reached out to patient's PCP for further information     Follow up in 1 week         Wound 02/12/25 Diabetic Ulcer Ankle Right;Lateral (Active)   Enter Aquino score: Aquino Grade 1: Partial or full-thickness ulcer (superficial) 02/19/25 1428   Wound Image Images linked 02/19/25 1429   Wound Description Pink;Epithelialization;Granulation tissue  02/19/25 1429   Sarah-wound Assessment Intact 02/19/25 1429   Wound Length (cm) 1.1 cm 02/19/25 1429   Wound Width (cm) 1.2 cm 02/19/25 1429   Wound Depth (cm) 0.1 cm 02/19/25 1429   Wound Surface Area (cm^2) 1.32 cm^2 02/19/25 1429   Wound Volume (cm^3) 0.132 cm^3 02/19/25 1429   Calculated Wound Volume (cm^3) 0.13 cm^3 02/19/25 1429   Change in Wound Size % 75 02/19/25 1429   Drainage Amount Moderate 02/19/25 1429   Drainage Description Serosanguineous;Yellow 02/19/25 1429   Non-staged Wound Description Full thickness 02/19/25 1429   Dressing Status Removed 02/19/25 1429       Wound 02/12/25 Diabetic Ulcer Ankle Right;Lateral (Active)   Date First Assessed: 02/12/25   Primary Wound Type: Diabetic Ulcer  Location: Ankle  Wound Location Orientation: Right;Lateral       Subjective:      .    49 year old male presents today with wound to the right heel. Pt applying topical as instructed at his appointment last week. Pt states pain is improved. Denies n/v/f/c.      Pt with new complaint today. Pt complains of dry cracking skin to both feet. STates over the counter lotion is not helping.           The following portions of the patient's history were reviewed and updated as appropriate: allergies, current medications, past family history, past medical history, past social history, past surgical history, and problem list.    Review of Systems   Constitutional:  Negative for chills and fever.   Gastrointestinal:  Negative for nausea and vomiting.   All other systems reviewed and are negative.        Objective:       Wound 02/12/25 Diabetic Ulcer Ankle Right;Lateral (Active)   Enter Aquino score: Aquino Grade 1: Partial or full-thickness ulcer (superficial) 02/19/25 1429   Wound Image Images linked 02/19/25 1422   Wound Description Pink;Epithelialization;Granulation tissue 02/19/25 1429   Sarah-wound Assessment Intact 02/19/25 1429   Wound Length (cm) 1.1 cm 02/19/25 1429   Wound Width (cm) 1.2 cm 02/19/25 1429   Wound Depth  (cm) 0.1 cm 02/19/25 1429   Wound Surface Area (cm^2) 1.32 cm^2 02/19/25 1429   Wound Volume (cm^3) 0.132 cm^3 02/19/25 1429   Calculated Wound Volume (cm^3) 0.13 cm^3 02/19/25 1429   Change in Wound Size % 75 02/19/25 1429   Drainage Amount Moderate 02/19/25 1429   Drainage Description Serosanguineous;Yellow 02/19/25 1429   Non-staged Wound Description Full thickness 02/19/25 1429   Dressing Status Removed 02/19/25 1429       /86   Pulse 84   Temp 97.6 °F (36.4 °C) (Tympanic)   Resp 18     Physical Exam  Vitals and nursing note reviewed.   Constitutional:       Appearance: Normal appearance. He is normal weight.   Cardiovascular:      Pulses:           Dorsalis pedis pulses are 1+ on the right side and 1+ on the left side.        Posterior tibial pulses are 1+ on the right side and 1+ on the left side.   Musculoskeletal:        Feet:    Feet:      Right foot:      Protective Sensation: 8 sites tested.  6 sites sensed.      Skin integrity: Fissure present.      Left foot:      Protective Sensation: 8 sites tested.  3 sites sensed.      Skin integrity: Fissure present.      Comments: Fissuring/cracking to skin b/l plantar feet          Wound Instructions:  Orders Placed This Encounter   Procedures    Wound cleansing and dressings Diabetic Ulcer Right;Lateral Ankle     Wound Cleansing and Dressing:  Shower: YES-WITH PROTECTIVE COVER SUCH A CAST COVER OR PLASTIC BAG- DO NOT GET DRESSING WET     Wound:right lateral heel  Apply 1/4 inch felt padding ring to skin around wound   Apply aquacel AG to wound bed as shown  Cover with folded 2 x 2 gauze and then flat 2 x 2 gauze  Secure with tape  Leave dressing in place until Friday and then change dressing every day  Change felt ring on Sunday     Increase protein foods to 3-4 servings per day.   Protein foods include all the meats, chicken, fish, dairy products such as milk, eggs, yogurt (especially greek yogurt), cheeses.   Other protein foods include hard beans  such as black or kidney beans, lentils, edamame, peanut butter and nuts such as almonds, cashews and peanuts.   If your appetite is poor, please consider protein shakes 1-2 times per day such as Ensure Max, Boost, Premier Protein or a similar product.     Follow up Dr. Pearson in 1 week.     Standing Status:   Future     Expiration Date:   2/26/2025    Debridement     This order was created via procedure documentation        Diagnosis ICD-10-CM Associated Orders   1. Diabetic ulcer of right midfoot associated with type 2 diabetes mellitus, with fat layer exposed (HCC)  E11.621 lidocaine (LMX) 4 % cream    L97.412 Wound cleansing and dressings Diabetic Ulcer Right;Lateral Ankle      2. Xerosis cutis  L85.3 silver sulfadiazine (SILVADENE,SSD) 1 % cream

## 2025-02-26 ENCOUNTER — OFFICE VISIT (OUTPATIENT)
Dept: WOUND CARE | Facility: CLINIC | Age: 50
End: 2025-02-26
Payer: COMMERCIAL

## 2025-02-26 VITALS
TEMPERATURE: 97 F | HEART RATE: 76 BPM | DIASTOLIC BLOOD PRESSURE: 76 MMHG | RESPIRATION RATE: 14 BRPM | SYSTOLIC BLOOD PRESSURE: 124 MMHG

## 2025-02-26 DIAGNOSIS — E11.621 DIABETIC ULCER OF RIGHT MIDFOOT ASSOCIATED WITH TYPE 2 DIABETES MELLITUS, WITH FAT LAYER EXPOSED (HCC): Primary | ICD-10-CM

## 2025-02-26 DIAGNOSIS — L97.412 DIABETIC ULCER OF RIGHT MIDFOOT ASSOCIATED WITH TYPE 2 DIABETES MELLITUS, WITH FAT LAYER EXPOSED (HCC): Primary | ICD-10-CM

## 2025-02-26 PROCEDURE — 97597 DBRDMT OPN WND 1ST 20 CM/<: CPT | Performed by: PODIATRIST

## 2025-02-26 RX ORDER — LIDOCAINE 40 MG/G
CREAM TOPICAL ONCE
Status: COMPLETED | OUTPATIENT
Start: 2025-02-26 | End: 2025-02-26

## 2025-02-26 RX ADMIN — LIDOCAINE: 40 CREAM TOPICAL at 15:36

## 2025-02-26 NOTE — PROGRESS NOTES
Wound Procedure Treatment Diabetic Ulcer Right;Lateral Ankle    Performed by: Matheus Hancock RN  Authorized by: Dmitriy Pearson DPM  Associated wounds:   Wound 02/12/25 Diabetic Ulcer Ankle Right;Lateral    Wound cleansed with:  NSS   Applied primary dressing:  Calcium alginate and Silver   Applied secondary dressing:  Other   Comments:  Aquacel ag rope, cosmopor

## 2025-02-26 NOTE — PROGRESS NOTES
"Patient ID: Jamie Mujica Jr. is a 49 y.o. male Date of Birth 1975     Chief Complaint  Chief Complaint   Patient presents with    Follow Up Wound Care Visit     Right ankle wound       Allergies  Patient has no known allergies.    Assessment:    No problem-specific Assessment & Plan notes found for this encounter.       Diagnoses and all orders for this visit:    Diabetic ulcer of right midfoot associated with type 2 diabetes mellitus, with fat layer exposed (HCC)  -     Wound cleansing and dressings Diabetic Ulcer Right;Lateral Ankle; Future  -     lidocaine (LMX) 4 % cream  -     Wound Procedure Treatment Diabetic Ulcer Right;Lateral Ankle    Other orders  -     Debridement                Debridement   Wound 02/12/25 Diabetic Ulcer Ankle Right;Lateral    Universal Protocol:  Consent: Verbal consent obtained.  Risks and benefits: risks, benefits and alternatives were discussed  Consent given by: patient  Time out: Immediately prior to procedure a \"time out\" was called to verify the correct patient, procedure, equipment, support staff and site/side marked as required.  Patient identity confirmed: verbally with patient    Debridement Details  Performed by: physician  Debridement type: selective  Pain control: lidocaine 4%      Post-debridement measurements  Length (cm): 0.4  Width (cm): 0.6  Depth (cm): 0.1  Percent debrided: 100%  Surface Area (cm^2): 0.24  Area Debrided (cm^2): 0.24  Volume (cm^3): 0.02    Devitalized tissue debrided: biofilm and fibrin  Instrument(s) utilized: blade  Bleeding: small  Hemostasis obtained with: pressure  Procedural pain (0-10): 0  Post-procedural pain: 0   Response to treatment: procedure was tolerated well        Plan:    - pt eval and managed     - Number and complexity of problems addressed: 1 stable chronic illness as shown     - Amount/complexity of data reviewed and analyzed:      Category 1: prior patient notes were analyzed today before evaluating and managing patient. " All PMH were discussed with pt today.   - Risk of complications: moderate risk of morbidity from additional testing or treatment involved with this patient, which includes but not limited to:      - discussed anatomy, condition, treatment plan and options. They were instructed on proper foot care. This is total time spent today involving both face-to-face time and non face-to-face time. This time spent includes reviewing their past medical history, performing a medically appropriate examination and evaluation of the patient, counseling and educating the patient,, documenting all findings in EMR, and independently interpreting results and communicating results with patientand discussing their condition and treatment options, risks, and potential complications. I have discussed the findings of this examination with the patient. The discussion included a complete verbal explanation of the examination results, diagnosis and planned treatment(s). A schedule for future care needs was explained. The patient has verbalized the understanding of these instructions at this time. If any questions should arise after returning home I have encouraged the patient to feel free to call the office.           - wound orders per nursing  - wound was debride today.   - STRESSED THAT PATIENT SHOULD HAVE DRESSING ON AT ALL TIMES IN ORDER FOR WOUND TO HEAL ACCORDINGLY  - pt instructed on home dressing changes.   - padding dispensed for pt today to alleviate pressure to the wound area.     - we will continue with local wound care.      Follow up in 1 week         Wound 02/12/25 Diabetic Ulcer Ankle Right;Lateral (Active)   Enter Aquino score: Aquino Grade 1: Partial or full-thickness ulcer (superficial) 02/26/25 1523   Wound Image Images linked 02/26/25 1524   Wound Description Epithelialization;Pink 02/26/25 1523   Sarah-wound Assessment Intact;Callus 02/26/25 1523   Wound Length (cm) 0.4 cm 02/26/25 1523   Wound Width (cm) 0.6 cm 02/26/25  1523   Wound Depth (cm) 0.1 cm 02/26/25 1523   Wound Surface Area (cm^2) 0.24 cm^2 02/26/25 1523   Wound Volume (cm^3) 0.024 cm^3 02/26/25 1523   Calculated Wound Volume (cm^3) 0.02 cm^3 02/26/25 1523   Change in Wound Size % 96.15 02/26/25 1523   Drainage Amount KEVIN 02/26/25 1523   Non-staged Wound Description Full thickness 02/26/25 1523   Wound packed? No 02/26/25 1523       Wound 02/12/25 Diabetic Ulcer Ankle Right;Lateral (Active)   Date First Assessed: 02/12/25   Primary Wound Type: Diabetic Ulcer  Location: Ankle  Wound Location Orientation: Right;Lateral       Subjective:      .    49 year old male presents today with wound to the right heel. Pt applying topical as instructed at his appointment last week. States he stopped putting anything on at the beginning of this week. Denies n/v/f/c.          The following portions of the patient's history were reviewed and updated as appropriate: allergies, current medications, past family history, past medical history, past social history, past surgical history, and problem list.    Review of Systems   Constitutional:  Negative for chills and fever.   Gastrointestinal:  Negative for nausea and vomiting.   All other systems reviewed and are negative.        Objective:       Wound 02/12/25 Diabetic Ulcer Ankle Right;Lateral (Active)   Enter Aquino score: Aquino Grade 1: Partial or full-thickness ulcer (superficial) 02/26/25 1523   Wound Image Images linked 02/26/25 1524   Wound Description Epithelialization;Pink 02/26/25 1523   Sarah-wound Assessment Intact;Callus 02/26/25 1523   Wound Length (cm) 0.4 cm 02/26/25 1523   Wound Width (cm) 0.6 cm 02/26/25 1523   Wound Depth (cm) 0.1 cm 02/26/25 1523   Wound Surface Area (cm^2) 0.24 cm^2 02/26/25 1523   Wound Volume (cm^3) 0.024 cm^3 02/26/25 1523   Calculated Wound Volume (cm^3) 0.02 cm^3 02/26/25 1523   Change in Wound Size % 96.15 02/26/25 1523   Drainage Amount KEVIN 02/26/25 1523   Non-staged Wound Description Full  thickness 02/26/25 1523   Wound packed? No 02/26/25 1523       /76   Pulse 76   Temp (!) 97 °F (36.1 °C)   Resp 14     Physical Exam  Vitals and nursing note reviewed.   Constitutional:       Appearance: Normal appearance. He is normal weight.   Cardiovascular:      Pulses:           Dorsalis pedis pulses are 1+ on the right side and 1+ on the left side.        Posterior tibial pulses are 1+ on the right side and 1+ on the left side.   Musculoskeletal:        Feet:    Feet:      Right foot:      Protective Sensation: 8 sites tested.  6 sites sensed.      Skin integrity: Fissure present.      Left foot:      Protective Sensation: 8 sites tested.  3 sites sensed.      Skin integrity: Fissure present.      Comments: Fissuring/cracking to skin b/l plantar feet          Wound Instructions:  Orders Placed This Encounter   Procedures    Wound cleansing and dressings Diabetic Ulcer Right;Lateral Ankle     Shower: YES-WITH PROTECTIVE COVER SUCH A CAST COVER OR PLASTIC BAG- DO NOT GET DRESSING WET.           Wound:right lateral heel:  Apply aquacel AG to wound bed.  Cover with cosmopor or bordered gauze.  Change dressing three times a week.          Increase protein foods to 3-4 servings per day.   Protein foods include all the meats, chicken, fish, dairy products such as milk, eggs, yogurt (especially greek yogurt), cheeses.   Other protein foods include hard beans such as black or kidney beans, lentils, edamame, peanut butter and nuts such as almonds, cashews and peanuts.   If your appetite is poor, please consider protein shakes 1-2 times per day such as Ensure Max, Boost, Premier Protein or a similar product.         Follow up at the wound center in one week.     Standing Status:   Future     Expiration Date:   3/5/2025    Debridement     This order was created via procedure documentation    Wound Procedure Treatment Diabetic Ulcer Right;Lateral Ankle     This order was created via procedure documentation         Diagnosis ICD-10-CM Associated Orders   1. Diabetic ulcer of right midfoot associated with type 2 diabetes mellitus, with fat layer exposed (HCC)  E11.621 Wound cleansing and dressings Diabetic Ulcer Right;Lateral Ankle    L97.412 lidocaine (LMX) 4 % cream     Wound Procedure Treatment Diabetic Ulcer Right;Lateral Ankle

## 2025-02-26 NOTE — PATIENT INSTRUCTIONS
Orders Placed This Encounter   Procedures    Wound cleansing and dressings Diabetic Ulcer Right;Lateral Ankle     Shower: YES-WITH PROTECTIVE COVER SUCH A CAST COVER OR PLASTIC BAG- DO NOT GET DRESSING WET.           Wound:right lateral heel:  Apply aquacel AG to wound bed.  Cover with cosmopor or bordered gauze.  Change dressing three times a week.          Increase protein foods to 3-4 servings per day.   Protein foods include all the meats, chicken, fish, dairy products such as milk, eggs, yogurt (especially greek yogurt), cheeses.   Other protein foods include hard beans such as black or kidney beans, lentils, edamame, peanut butter and nuts such as almonds, cashews and peanuts.   If your appetite is poor, please consider protein shakes 1-2 times per day such as Ensure Max, Boost, Premier Protein or a similar product.         Follow up at the wound center in one week.     Standing Status:   Future     Expiration Date:   3/5/2025    Debridement     This order was created via procedure documentation

## 2025-03-05 ENCOUNTER — OFFICE VISIT (OUTPATIENT)
Dept: WOUND CARE | Facility: CLINIC | Age: 50
End: 2025-03-05
Payer: COMMERCIAL

## 2025-03-05 ENCOUNTER — APPOINTMENT (OUTPATIENT)
Dept: LAB | Facility: HOSPITAL | Age: 50
End: 2025-03-05
Payer: COMMERCIAL

## 2025-03-05 VITALS
HEART RATE: 76 BPM | SYSTOLIC BLOOD PRESSURE: 120 MMHG | RESPIRATION RATE: 16 BRPM | DIASTOLIC BLOOD PRESSURE: 78 MMHG | TEMPERATURE: 96.8 F

## 2025-03-05 DIAGNOSIS — L97.412 DIABETIC ULCER OF RIGHT MIDFOOT ASSOCIATED WITH TYPE 2 DIABETES MELLITUS, WITH FAT LAYER EXPOSED (HCC): Primary | ICD-10-CM

## 2025-03-05 DIAGNOSIS — E11.621 DIABETIC ULCER OF RIGHT MIDFOOT ASSOCIATED WITH TYPE 2 DIABETES MELLITUS, WITH FAT LAYER EXPOSED (HCC): Primary | ICD-10-CM

## 2025-03-05 DIAGNOSIS — Z79.899 NEED FOR PROPHYLACTIC CHEMOTHERAPY: ICD-10-CM

## 2025-03-05 DIAGNOSIS — R74.8 ACID PHOSPHATASE ELEVATED: ICD-10-CM

## 2025-03-05 DIAGNOSIS — Z51.81 ENCOUNTER FOR THERAPEUTIC DRUG MONITORING: ICD-10-CM

## 2025-03-05 LAB
ALBUMIN SERPL BCG-MCNC: 4.2 G/DL (ref 3.5–5)
ALP SERPL-CCNC: 59 U/L (ref 34–104)
ALT SERPL W P-5'-P-CCNC: 40 U/L (ref 7–52)
ANION GAP SERPL CALCULATED.3IONS-SCNC: 7 MMOL/L (ref 4–13)
AST SERPL W P-5'-P-CCNC: 14 U/L (ref 13–39)
BASOPHILS # BLD AUTO: 0.02 THOUSANDS/ÂΜL (ref 0–0.1)
BASOPHILS NFR BLD AUTO: 0 % (ref 0–1)
BILIRUB SERPL-MCNC: 0.54 MG/DL (ref 0.2–1)
BUN SERPL-MCNC: 23 MG/DL (ref 5–25)
CALCIUM SERPL-MCNC: 9.6 MG/DL (ref 8.4–10.2)
CHLORIDE SERPL-SCNC: 103 MMOL/L (ref 96–108)
CO2 SERPL-SCNC: 26 MMOL/L (ref 21–32)
CREAT SERPL-MCNC: 0.64 MG/DL (ref 0.6–1.3)
CRP SERPL QL: <1 MG/L
EOSINOPHIL # BLD AUTO: 0.13 THOUSAND/ÂΜL (ref 0–0.61)
EOSINOPHIL NFR BLD AUTO: 2 % (ref 0–6)
ERYTHROCYTE [DISTWIDTH] IN BLOOD BY AUTOMATED COUNT: 11.3 % (ref 11.6–15.1)
ERYTHROCYTE [SEDIMENTATION RATE] IN BLOOD: 1 MM/HOUR (ref 0–14)
GFR SERPL CREATININE-BSD FRML MDRD: 116 ML/MIN/1.73SQ M
GLUCOSE SERPL-MCNC: 199 MG/DL (ref 65–140)
HCT VFR BLD AUTO: 43.5 % (ref 36.5–49.3)
HGB BLD-MCNC: 14.5 G/DL (ref 12–17)
IMM GRANULOCYTES # BLD AUTO: 0.05 THOUSAND/UL (ref 0–0.2)
IMM GRANULOCYTES NFR BLD AUTO: 1 % (ref 0–2)
LYMPHOCYTES # BLD AUTO: 1.62 THOUSANDS/ÂΜL (ref 0.6–4.47)
LYMPHOCYTES NFR BLD AUTO: 23 % (ref 14–44)
MCH RBC QN AUTO: 34 PG (ref 26.8–34.3)
MCHC RBC AUTO-ENTMCNC: 33.3 G/DL (ref 31.4–37.4)
MCV RBC AUTO: 102 FL (ref 82–98)
MONOCYTES # BLD AUTO: 0.73 THOUSAND/ÂΜL (ref 0.17–1.22)
MONOCYTES NFR BLD AUTO: 10 % (ref 4–12)
NEUTROPHILS # BLD AUTO: 4.63 THOUSANDS/ÂΜL (ref 1.85–7.62)
NEUTS SEG NFR BLD AUTO: 64 % (ref 43–75)
NRBC BLD AUTO-RTO: 0 /100 WBCS
PLATELET # BLD AUTO: 219 THOUSANDS/UL (ref 149–390)
PMV BLD AUTO: 10.9 FL (ref 8.9–12.7)
POTASSIUM SERPL-SCNC: 4.1 MMOL/L (ref 3.5–5.3)
PROT SERPL-MCNC: 6.3 G/DL (ref 6.4–8.4)
RBC # BLD AUTO: 4.27 MILLION/UL (ref 3.88–5.62)
SODIUM SERPL-SCNC: 136 MMOL/L (ref 135–147)
WBC # BLD AUTO: 7.18 THOUSAND/UL (ref 4.31–10.16)

## 2025-03-05 PROCEDURE — 85652 RBC SED RATE AUTOMATED: CPT

## 2025-03-05 PROCEDURE — 86140 C-REACTIVE PROTEIN: CPT

## 2025-03-05 PROCEDURE — 11042 DBRDMT SUBQ TIS 1ST 20SQCM/<: CPT | Performed by: PODIATRIST

## 2025-03-05 PROCEDURE — 86480 TB TEST CELL IMMUN MEASURE: CPT

## 2025-03-05 PROCEDURE — 80053 COMPREHEN METABOLIC PANEL: CPT

## 2025-03-05 PROCEDURE — 85025 COMPLETE CBC W/AUTO DIFF WBC: CPT

## 2025-03-05 PROCEDURE — 86704 HEP B CORE ANTIBODY TOTAL: CPT

## 2025-03-05 PROCEDURE — 36415 COLL VENOUS BLD VENIPUNCTURE: CPT

## 2025-03-05 RX ORDER — MUPIROCIN 20 MG/G
OINTMENT TOPICAL DAILY
Qty: 15 G | Refills: 0 | Status: SHIPPED | OUTPATIENT
Start: 2025-03-05

## 2025-03-05 RX ORDER — LIDOCAINE 40 MG/G
CREAM TOPICAL ONCE
Status: COMPLETED | OUTPATIENT
Start: 2025-03-05 | End: 2025-03-05

## 2025-03-05 RX ADMIN — LIDOCAINE: 40 CREAM TOPICAL at 15:29

## 2025-03-05 NOTE — PROGRESS NOTES
Name: Jamie Mujica Jr.      : 1975      MRN: [id number]  Encounter Provider: Dmitriy Pearson DPM  Encounter Date: 3/5/2025   Encounter department: [facility] WOUND CARE  :  Assessment & Plan  Diabetic ulcer of right midfoot associated with type 2 diabetes mellitus, with fat layer exposed (HCC)    Lab Results   Component Value Date    HGBA1C 8.7 (A) 2025       Orders:    Wound cleansing and dressings Diabetic Ulcer Right;Lateral Ankle; Future    lidocaine (LMX) 4 % cream    Wound Procedure Treatment Diabetic Ulcer Right;Lateral Ankle        - pt eval and managed     - Number and complexity of problems addressed: 1 stable chronic illness as shown     - Amount/complexity of data reviewed and analyzed:      Category 1: prior patient notes were analyzed today before evaluating and managing patient. All PMH were discussed with pt today.   - Risk of complications: moderate risk of morbidity from additional testing or treatment involved with this patient, which includes but not limited to:      - discussed anatomy, condition, treatment plan and options. They were instructed on proper foot care. This is total time spent today involving both face-to-face time and non face-to-face time. This time spent includes reviewing their past medical history, performing a medically appropriate examination and evaluation of the patient, counseling and educating the patient,, documenting all findings in EMR, and independently interpreting results and communicating results with patientand discussing their condition and treatment options, risks, and potential complications. I have discussed the findings of this examination with the patient. The discussion included a complete verbal explanation of the examination results, diagnosis and planned treatment(s). A schedule for future care needs was explained. The patient has verbalized the understanding of these instructions at this time. If any questions  should arise after returning home I have encouraged the patient to feel free to call the office.           - wound orders per nursing  - Rx Mupirocin sent for pt. To begin daily application  - wound was debride today.   - pt instructed on home dressing changes.   - padding dispensed for pt today to alleviate pressure to the wound area.      - we will continue with local wound care.        Follow up in 1 week    History of Present Illness   Chief Complaint   Patient presents with    Follow Up Wound Care Visit     Right ankle wound   Here for wound follow up.    49 year old male presents today with wound to the right heel. Pt applying aquacel as instructed. S. Denies n/v/f/c.         Objective   /78   Pulse 76   Temp (!) 96.8 °F (36 °C)   Resp 16     Physical Exam  Constitutional:       Appearance: Normal appearance. He is normal weight.   Cardiovascular:      Pulses:           Dorsalis pedis pulses are 1+ on the right side and 1+ on the left side.        Posterior tibial pulses are 1+ on the right side and 1+ on the left side.   Musculoskeletal:        Feet:    Feet:      Right foot:      Protective Sensation: 8 sites tested.  2 sites sensed.      Left foot:      Protective Sensation: 8 sites tested.  4 sites sensed.       Wound 02/12/25 Diabetic Ulcer Ankle Right;Lateral (Active)   Enter Aquino score: Aquino Grade 1: Partial or full-thickness ulcer (superficial) 03/05/25 1513   Wound Image   03/05/25 1531   Wound Description Epithelialization;Black 03/05/25 1513   Sarah-wound Assessment Intact;Dry;Scaly 03/05/25 1513   Wound Length (cm) 0.2 cm 03/05/25 1513   Wound Width (cm) 0.2 cm 03/05/25 1513   Wound Depth (cm) 0.1 cm 03/05/25 1513   Wound Surface Area (cm^2) 0.04 cm^2 03/05/25 1513   Wound Volume (cm^3) 0.004 cm^3 03/05/25 1513   Calculated Wound Volume (cm^3) 0 cm^3 03/05/25 1513   Change in Wound Size % 100 03/05/25 1513   Drainage Amount Scant 03/05/25 1513   Drainage Description Yellow 03/05/25 1513  "  Non-staged Wound Description Full thickness 02/26/25 1523   Wound packed? No 03/05/25 1513   Dressing Status Removed 02/19/25 1429       Debridement   Wound 02/12/25 Diabetic Ulcer Ankle Right;Lateral    Universal Protocol:  Consent: Verbal consent obtained.  Risks and benefits: risks, benefits and alternatives were discussed  Consent given by: patient  Time out: Immediately prior to procedure a \"time out\" was called to verify the correct patient, procedure, equipment, support staff and site/side marked as required.  Patient identity confirmed: verbally with patient    Debridement Details  Performed by: physician  Debridement type: surgical  Level of debridement: subcutaneous tissue  Pain control: lidocaine 4%      Post-debridement measurements  Length (cm): 0.4  Width (cm): 0.4  Depth (cm): 0.2  Percent debrided: 100%  Surface Area (cm^2): 0.16  Area Debrided (cm^2): 0.16  Volume (cm^3): 0.03    Tissue and other material debrided: subcutaneous tissue  Devitalized tissue debrided: biofilm and fibrin  Instrument(s) utilized: blade  Bleeding: small  Hemostasis obtained with: pressure  Procedural pain (0-10): 0  Post-procedural pain: 0   Response to treatment: procedure was tolerated well               "

## 2025-03-05 NOTE — PATIENT INSTRUCTIONS
Orders Placed This Encounter   Procedures    Wound cleansing and dressings Diabetic Ulcer Right;Lateral Ankle     Wash your hands with soap and water.  Remove old dressing, discard into plastic bag and place in trash.  Cleanse the wound with mild soap(Dove) and water prior to applying a clean dressing. Do not use tissue or cotton balls. Do not scrub the wound. Pat dry using gauze.  Shower yes        Wound:right lateral heel:  Apply mupirocin to the wound.  Cover with cosmopor or bordered gauze.  Change dressing daily.            Increase protein foods to 3-4 servings per day.   Protein foods include all the meats, chicken, fish, dairy products such as milk, eggs, yogurt (especially greek yogurt), cheeses.   Other protein foods include hard beans such as black or kidney beans, lentils, edamame, peanut butter and nuts such as almonds, cashews and peanuts.   If your appetite is poor, please consider protein shakes 1-2 times per day such as Ensure Max, Boost, Premier Protein or a similar product.      Script sent to your pharmacy for mupirocin.            Follow up at the wound center in one week.     Standing Status:   Future     Expiration Date:   3/12/2025

## 2025-03-05 NOTE — PROGRESS NOTES
Wound Procedure Treatment Diabetic Ulcer Right;Lateral Ankle    Performed by: Matheus Hancock RN  Authorized by: Dmitriy Pearson DPM  Associated wounds:   Wound 02/12/25 Diabetic Ulcer Ankle Right;Lateral    Wound cleansed with:  NSS   Applied topical:  Mupirocin ointment   Applied secondary dressing:  Other   Comments:  Cosmopor

## 2025-03-06 LAB — HBV CORE AB SER QL: NORMAL

## 2025-03-07 LAB
GAMMA INTERFERON BACKGROUND BLD IA-ACNC: 0.03 IU/ML
M TB IFN-G BLD-IMP: NEGATIVE
M TB IFN-G CD4+ BCKGRND COR BLD-ACNC: 0 IU/ML
M TB IFN-G CD4+ BCKGRND COR BLD-ACNC: 0.02 IU/ML
MITOGEN IGNF BCKGRD COR BLD-ACNC: 9.97 IU/ML

## 2025-03-24 ENCOUNTER — OFFICE VISIT (OUTPATIENT)
Dept: FAMILY MEDICINE CLINIC | Facility: CLINIC | Age: 50
End: 2025-03-24

## 2025-03-24 VITALS
HEART RATE: 68 BPM | OXYGEN SATURATION: 97 % | SYSTOLIC BLOOD PRESSURE: 120 MMHG | BODY MASS INDEX: 22.97 KG/M2 | TEMPERATURE: 96.8 F | HEIGHT: 72 IN | RESPIRATION RATE: 16 BRPM | DIASTOLIC BLOOD PRESSURE: 70 MMHG | WEIGHT: 169.6 LBS

## 2025-03-24 DIAGNOSIS — R07.9 CHEST PAIN, UNSPECIFIED TYPE: ICD-10-CM

## 2025-03-24 DIAGNOSIS — F41.1 GENERALIZED ANXIETY DISORDER: Primary | ICD-10-CM

## 2025-03-24 PROCEDURE — 99213 OFFICE O/P EST LOW 20 MIN: CPT | Performed by: INTERNAL MEDICINE

## 2025-03-24 PROCEDURE — G2211 COMPLEX E/M VISIT ADD ON: HCPCS | Performed by: INTERNAL MEDICINE

## 2025-03-24 RX ORDER — ESCITALOPRAM OXALATE 20 MG/1
20 TABLET ORAL DAILY
Qty: 30 TABLET | Refills: 0 | Status: SHIPPED | OUTPATIENT
Start: 2025-03-24

## 2025-03-24 NOTE — PATIENT INSTRUCTIONS
Use heating pad for possible muscle pain.  Use a pillow in btwn arms when sleeping.    Outpatient Mental Health Resources    If you would like to be placed on the wait list for services with [facility] you MUST contact intake at [facility] - [phone]    Emergency & Crisis Support    Suicide and Crisis Lifeline: Call or text 988 (Available 24 hours)  Crisis Text Line: Text HOME to 869039 (Available 24/7)  Warm Line: Call [phone] (Confidential mental health support, available Monday-Sunday: 6 AM-10 AM & 4 PM-12 AM)  [facility] Crisis: Call [phone] (For mental health emergencies, or visit your local Emergency Department)  [facility] [place]: Call [phone] (24/7 [facility] Hotline, counseling, court & hospital accompaniment, free services)    [facility]    [facility]  Call [phone]  Website: [email or web address]  Support for mental health conditions. Free services for all    [facility]  [address] [place] [address] [phone]  Services for all ages; Bilingual (English/Filipino); Accepts Medical Assistance, Medicare and commercial insurance    [facility]   [address], [place] [address]  [phone]  Services for all ages; Bilingual (English/Filipino); Accepts [employer] Blue [employer] Blue Shield, [employer], Aetna, Optum and Cigna    [facility]  [address], [place] [address] [phone]  Services for ages 4+. English only; Does NOT accept Medical Assistance (only Commercial Insurance)    [facility]   [address], [place], [place]   [phone]  Services for all ages; English only; Accepts [employer] Blue [employer] Blue Shield, [employer] Blue Cross Blue Sheild and Medicare    [facility]  [address] [place] [address]  [phone]  Services for ages 6+. Bilingual (English/Filipino); Accepts Medical Assistance only    [facility]  [address] [place] [address]  [phone]  Services for ages 5+. Bilingual (English/Chadian); Accepts Medical Assistance    [facility]  [address] [place] [address]  [phone]  Services for ages 14+. Bilingual (English/Chadian); Accepts Medical Assistance, Medicare, and Commercial Insurance    [facility]  [address] [place] [address]  [phone]  Services for ages 5+. Bilingual (English/Chadian); Accepts Medical Assistance    [facility]  [address] [place] [address]  [phone]  Services for ages 3+. Bilingual (English/Chadian); Accepts Medical Assistance and Some Commercial Insurance    [facility]  [address], [place] [address]  [phone]  Services for ages 5+. Bilingual (English/Chadian); Accepts Medical Assistance    [facility]  [address], [place] [address]  [phone]  Services for ages 6+. Bilingual (English/Chadian); Accepts Medical Assistance and Commercial Insurance    [facility]   [address] [place] [address]  [phone]  Services for ages 5+. Bilingual (English/Chadian); Accepts Medical Assistance, Medicare and Commercial Insurance     [facility]  [address], [address], [place] [address]  [phone]  Services for all ages (Therapy); 18+ (Psychiatry); English only; Accepts [employer] Blue Cross, Aetna, Highmark, Magellan, [facility] (CHIP & commercial insurance)      www.psychologytoday.com is a resource to find psychotherapy providers, patients can filter therapist search list based on several criteria including language, specialty, gender, insurance, etc. Individuals seeking will need to reach out to perspective providers through information in the directory. You are encouraged to contact multiple providers to given that many providers have a significant wait list for services as well as to find a provider is a good fit for you!      Please contact your insurance provider for additional information.

## 2025-03-24 NOTE — PROGRESS NOTES
Name: Jamie Mujica Jr.      : 1975      MRN: [id number]  Encounter Provider: Deisy Funes MD  Encounter Date: 3/24/2025   Encounter department: [facility]  :  Assessment & Plan  Generalized anxiety disorder  ADRIANA -7: severe anxiety.  Increase lexapro to 20 mg daily.  Behavioral health resources given.  Follow up with PCP at next scheduled appointment.       Chest pain, unspecified type  Likely musculoskeletal vs costochondritis.  Use heating pad for possible muscle pain.  Use a pillow in btwn arms when sleeping.              History of Present Illness   50 yo male presents with concerns for right sided chest pain which started about 1 month ago.  He cannot recall whether onset was sudden or gradual.  Chest pain has occurred more frequently and has been unchanged.  Pain is located in his right chest without radiation.  Describes pain as aching/pulling and sometimes soreness.  He notices that his anxiety makes chest pain worse and he finds himself in a loop or worrying and thinking about his chest pain.  Denies burning, crushing, pressure, or sharpness. Denies abdominal pain, cough, shortness of breath, irregular heartbeat, fever, headaches.  Chest pain is worse with activity.  He currently works as a . He always sleeps on his side.  He is taking celecoxib 200 mg daily for rheumatoid arthritis with some relief.   Of note, he takes propranolol as needed for anxiety.  Follows with Dr. Reeves at [facility] 1 week ago; follows monthly. He does not take his Lexapro 10 mg daily.      Review of Systems   Constitutional:  Negative for chills, fatigue and fever.   Respiratory:  Negative for cough, chest tightness, shortness of breath and wheezing.    Cardiovascular:  Positive for chest pain (right chest). Negative for palpitations and leg swelling.   Gastrointestinal:  Negative for abdominal distention, abdominal pain, constipation, diarrhea, nausea and vomiting.    Neurological:  Negative for headaches.   Psychiatric/Behavioral:  Negative for behavioral problems, confusion, decreased concentration, self-injury, sleep disturbance and suicidal ideas. The patient is nervous/anxious.        Objective   /70 (BP Location: Left arm, Patient Position: Sitting, Cuff Size: Standard)   Pulse 68   Temp (!) 96.8 °F (36 °C) (Temporal)   Resp 16   Ht 6' (1.829 m)   Wt 76.9 kg (169 lb 9.6 oz)   SpO2 97%   BMI 23.00 kg/m²      Physical Exam  Vitals reviewed.   Constitutional:       General: He is not in acute distress.     Appearance: Normal appearance. He is not ill-appearing.   HENT:      Nose: Nose normal.      Mouth/Throat:      Mouth: Mucous membranes are moist.   Eyes:      Extraocular Movements: Extraocular movements intact.   Cardiovascular:      Rate and Rhythm: Normal rate and regular rhythm.      Pulses: Normal pulses.           Radial pulses are 2+ on the right side and 2+ on the left side.   Pulmonary:      Effort: Pulmonary effort is normal. No respiratory distress.      Breath sounds: No stridor. No wheezing, rhonchi or rales.   Abdominal:      General: Bowel sounds are normal. There is no distension.      Palpations: Abdomen is soft.      Tenderness: There is no abdominal tenderness.   Musculoskeletal:         General: Tenderness present. No swelling or signs of injury. Normal range of motion.      Cervical back: Normal range of motion.      Comments: Tenderness to pectoralis major at second intercostal space   Skin:     General: Skin is warm.      Capillary Refill: Capillary refill takes less than 2 seconds.   Neurological:      Mental Status: He is alert.   Psychiatric:         Attention and Perception: Attention and perception normal.         Mood and Affect: Mood is anxious. Mood is not depressed. Affect is not labile, blunt, flat, angry, tearful or inappropriate.         Speech: Speech normal.         Behavior: Behavior normal.         Thought Content:  Thought content does not include homicidal or suicidal ideation. Thought content does not include homicidal or suicidal plan.

## 2025-04-10 ENCOUNTER — OFFICE VISIT (OUTPATIENT)
Dept: FAMILY MEDICINE CLINIC | Facility: CLINIC | Age: 50
End: 2025-04-10

## 2025-04-10 VITALS
BODY MASS INDEX: 24 KG/M2 | SYSTOLIC BLOOD PRESSURE: 132 MMHG | OXYGEN SATURATION: 96 % | HEIGHT: 72 IN | DIASTOLIC BLOOD PRESSURE: 86 MMHG | RESPIRATION RATE: 18 BRPM | HEART RATE: 94 BPM | WEIGHT: 177.2 LBS | TEMPERATURE: 98.1 F

## 2025-04-10 DIAGNOSIS — M06.9 RHEUMATOID ARTHRITIS, INVOLVING UNSPECIFIED SITE, UNSPECIFIED WHETHER RHEUMATOID FACTOR PRESENT (HCC): ICD-10-CM

## 2025-04-10 DIAGNOSIS — L97.511 DIABETIC ULCER OF OTHER PART OF RIGHT FOOT ASSOCIATED WITH DIABETES MELLITUS DUE TO UNDERLYING CONDITION, LIMITED TO BREAKDOWN OF SKIN (HCC): ICD-10-CM

## 2025-04-10 DIAGNOSIS — J34.89 RHINORRHEA: ICD-10-CM

## 2025-04-10 DIAGNOSIS — R09.81 CONGESTION OF NASAL SINUS: ICD-10-CM

## 2025-04-10 DIAGNOSIS — Z91.09 ENVIRONMENTAL ALLERGIES: ICD-10-CM

## 2025-04-10 DIAGNOSIS — E11.8 TYPE 2 DIABETES MELLITUS WITH COMPLICATION, WITHOUT LONG-TERM CURRENT USE OF INSULIN (HCC): Primary | ICD-10-CM

## 2025-04-10 DIAGNOSIS — E08.621 DIABETIC ULCER OF OTHER PART OF RIGHT FOOT ASSOCIATED WITH DIABETES MELLITUS DUE TO UNDERLYING CONDITION, LIMITED TO BREAKDOWN OF SKIN (HCC): ICD-10-CM

## 2025-04-10 DIAGNOSIS — E78.1 HYPERTRIGLYCERIDEMIA: ICD-10-CM

## 2025-04-10 LAB — SL AMB POCT HEMOGLOBIN AIC: 7.7 (ref ?–6.5)

## 2025-04-10 PROCEDURE — 83036 HEMOGLOBIN GLYCOSYLATED A1C: CPT | Performed by: FAMILY MEDICINE

## 2025-04-10 PROCEDURE — G2211 COMPLEX E/M VISIT ADD ON: HCPCS | Performed by: FAMILY MEDICINE

## 2025-04-10 PROCEDURE — 99214 OFFICE O/P EST MOD 30 MIN: CPT | Performed by: FAMILY MEDICINE

## 2025-04-10 RX ORDER — LORATADINE 10 MG/1
10 TABLET ORAL DAILY
Qty: 100 TABLET | Refills: 0 | Status: SHIPPED | OUTPATIENT
Start: 2025-04-10 | End: 2025-04-10

## 2025-04-10 RX ORDER — LORATADINE 10 MG/1
10 TABLET ORAL DAILY
Qty: 100 TABLET | Refills: 3 | Status: SHIPPED | OUTPATIENT
Start: 2025-04-10

## 2025-04-10 RX ORDER — CELECOXIB 200 MG/1
200 CAPSULE ORAL 2 TIMES DAILY
Qty: 60 CAPSULE | Refills: 1 | Status: SHIPPED | OUTPATIENT
Start: 2025-04-10

## 2025-04-10 RX ORDER — FLUTICASONE PROPIONATE 50 MCG
1 SPRAY, SUSPENSION (ML) NASAL DAILY
Qty: 11.1 ML | Refills: 0 | Status: SHIPPED | OUTPATIENT
Start: 2025-04-10

## 2025-04-10 RX ORDER — BUSPIRONE HYDROCHLORIDE 10 MG/1
1 TABLET ORAL 2 TIMES DAILY
COMMUNITY
Start: 2025-03-19

## 2025-04-10 RX ORDER — ROSUVASTATIN CALCIUM 10 MG/1
10 TABLET, COATED ORAL DAILY
Qty: 90 TABLET | Refills: 5 | Status: SHIPPED | OUTPATIENT
Start: 2025-04-10

## 2025-04-10 RX ORDER — UPADACITINIB 15 MG/1
15 TABLET, EXTENDED RELEASE ORAL DAILY
COMMUNITY
Start: 2025-03-07

## 2025-04-10 NOTE — ASSESSMENT & PLAN NOTE
Recommend statin  Dietary counseling provided  Recheck lipid panel  Orders:    Lipid panel; Future

## 2025-04-10 NOTE — PROGRESS NOTES
Name: Jamie Mujica Jr.      : 1975      MRN: [id number]  Encounter Provider: Thom Vargas MD  Encounter Date: 4/10/2025   Encounter department: [facility]  :  Assessment & Plan  Type 2 diabetes mellitus with complication, without long-term current use of insulin ([facility])    Lab Results   Component Value Date    HGBA1C 7.7 (A) 04/10/2025   Hemoglobin A1c is improving.  Hemoglobin A1c goal is less than 7  He is noncompliant with Jardiance.  He uses it as needed.  Continue metformin and Januvia  Recommend taking Jardiance regularly  He is up-to-date with podiatry evaluation  Recommend ophthalmology evaluation outpatient  Continue low carbohydrate diet    Orders:    POCT hemoglobin A1c    Lipid panel; Future    Vitamin B12; Future    sitaGLIPtin (Januvia) 100 mg tablet; Take 1 tablet (100 mg total) by mouth daily    metFORMIN (GLUCOPHAGE) 1000 MG tablet; Take 1 tablet (1,000 mg total) by mouth 2 (two) times a day with meals    rosuvastatin (CRESTOR) 10 MG tablet; Take 1 tablet (10 mg total) by mouth daily    Diabetic ulcer of other part of right foot associated with diabetes mellitus due to underlying condition, limited to breakdown of skin (HCC)  Wound has healed  Continue wound care at home  Follow-up with podiatry outpatient as needed       Rheumatoid arthritis, involving unspecified site, unspecified whether rheumatoid factor present (HCC)  Stable  Chart review showed inflammatory markers are low  Continue Rinvoq  Follow-up with rheumatology outpatient  Orders:    celecoxib (CeleBREX) 200 mg capsule; Take 1 capsule (200 mg total) by mouth 2 (two) times a day    Hypertriglyceridemia  Recommend statin  Dietary counseling provided  Recheck lipid panel  Orders:    Lipid panel; Future    Rhinorrhea    Orders:    fluticasone (FLONASE) 50 mcg/act nasal spray; 1 spray into each nostril daily    Congestion of nasal sinus    Orders:    fluticasone (FLONASE) 50 mcg/act  nasal spray; 1 spray into each nostril daily    Environmental allergies    Orders:    loratadine (CLARITIN) 10 mg tablet; Take 1 tablet (10 mg total) by mouth daily           History of Present Illness   49-year-old male with a history of rheumatoid arthritis, and type 2 diabetes who presents today for follow-up.  He is doing very well.  He had a wound in his right foot that has healed completely.  He has been following with wound care.  His rheumatological condition is stable.  He has significantly less joint pain since he started taking Rinvoq.  He does report that his allergies have been flared up lately.  He reports nasal congestion, sneezing, and sore throat.      Review of Systems   Constitutional:  Negative for appetite change, chills, diaphoresis, fatigue and fever.   HENT:  Positive for congestion, sneezing and sore throat.    Eyes:  Negative for visual disturbance.   Respiratory:  Negative for cough, shortness of breath and wheezing.    Cardiovascular:  Negative for chest pain, palpitations and leg swelling.   Gastrointestinal:  Negative for abdominal pain, nausea and vomiting.   Endocrine: Negative for polydipsia, polyphagia and polyuria.   Genitourinary:  Negative for difficulty urinating, dysuria, frequency, hematuria and urgency.   Musculoskeletal:  Negative for arthralgias, back pain and gait problem.   Skin:  Negative for wound.   Allergic/Immunologic: Positive for environmental allergies.   Neurological:  Negative for dizziness, tremors, seizures, syncope, weakness, light-headedness, numbness and headaches.       Objective   /86 (BP Location: Left arm, Patient Position: Sitting, Cuff Size: Standard)   Pulse 94   Temp 98.1 °F (36.7 °C) (Temporal)   Resp 18   Ht 6' (1.829 m)   Wt 80.4 kg (177 lb 3.2 oz)   SpO2 96%   BMI 24.03 kg/m²      Physical Exam  Vitals and nursing note reviewed.   Constitutional:       General: He is not in acute distress.     Appearance: Normal appearance. He is  well-developed. He is not ill-appearing, toxic-appearing or diaphoretic.   HENT:      Head: Normocephalic and atraumatic.      Right Ear: External ear normal.      Left Ear: External ear normal.      Nose: Nose normal.      Mouth/Throat:      Mouth: Mucous membranes are moist.      Pharynx: No oropharyngeal exudate or posterior oropharyngeal erythema.   Eyes:      General: No scleral icterus.        Right eye: No discharge.         Left eye: No discharge.      Extraocular Movements: Extraocular movements intact.      Conjunctiva/sclera: Conjunctivae normal.   Cardiovascular:      Rate and Rhythm: Normal rate and regular rhythm.      Pulses:           Dorsalis pedis pulses are 2+ on the right side and 2+ on the left side.        Posterior tibial pulses are 2+ on the right side and 2+ on the left side.      Heart sounds: Normal heart sounds. No murmur heard.     No friction rub. No gallop.   Pulmonary:      Effort: Pulmonary effort is normal. No respiratory distress.      Breath sounds: Normal breath sounds. No stridor. No wheezing or rhonchi.   Abdominal:      General: Bowel sounds are normal. There is no distension.      Palpations: Abdomen is soft. There is no mass.      Tenderness: There is no abdominal tenderness.      Hernia: No hernia is present.   Musculoskeletal:         General: Normal range of motion.      Cervical back: Normal range of motion.      Right lower leg: No edema.      Left lower leg: No edema.      Right foot: Normal range of motion. No deformity.      Left foot: Normal range of motion. No deformity.   Feet:      Right foot:      Skin integrity: No ulcer, skin breakdown or erythema.      Left foot:      Skin integrity: No ulcer, skin breakdown or erythema.   Skin:     General: Skin is warm.      Capillary Refill: Capillary refill takes less than 2 seconds.   Neurological:      General: No focal deficit present.      Mental Status: He is alert and oriented to person, place, and time.      Motor:  No weakness.      Gait: Gait normal.   Psychiatric:         Mood and Affect: Mood normal.         Behavior: Behavior normal.

## 2025-04-10 NOTE — ASSESSMENT & PLAN NOTE
Stable  Chart review showed inflammatory markers are low  Continue Rinvoq  Follow-up with rheumatology outpatient  Orders:    celecoxib (CeleBREX) 200 mg capsule; Take 1 capsule (200 mg total) by mouth 2 (two) times a day

## 2025-04-10 NOTE — ASSESSMENT & PLAN NOTE
Lab Results   Component Value Date    HGBA1C 7.7 (A) 04/10/2025   Hemoglobin A1c is improving.  Hemoglobin A1c goal is less than 7  He is noncompliant with Jardiance.  He uses it as needed.  Continue metformin and Januvia  Recommend taking Jardiance regularly  He is up-to-date with podiatry evaluation  Recommend ophthalmology evaluation outpatient  Continue low carbohydrate diet    Orders:    POCT hemoglobin A1c    Lipid panel; Future    Vitamin B12; Future    sitaGLIPtin (Januvia) 100 mg tablet; Take 1 tablet (100 mg total) by mouth daily    metFORMIN (GLUCOPHAGE) 1000 MG tablet; Take 1 tablet (1,000 mg total) by mouth 2 (two) times a day with meals    rosuvastatin (CRESTOR) 10 MG tablet; Take 1 tablet (10 mg total) by mouth daily

## 2025-04-30 ENCOUNTER — RA CDI HCC (OUTPATIENT)
Dept: OTHER | Facility: HOSPITAL | Age: 50
End: 2025-04-30

## 2025-04-30 NOTE — PROGRESS NOTES
HCC coding opportunities          Chart Reviewed number of suggestions sent to Provider: 1     Patients Insurance     Medicare Insurance: [employer] Advantage          Type 2 diabetes mellitus with mild nonproliferative diabetic retinopathy without macular edema, right eye [E11.2191]      Refer to Ophthalmology  note from 8/22/2023 - please review and assess and document per MEAT if applicable for 2025

## 2025-05-01 ENCOUNTER — OFFICE VISIT (OUTPATIENT)
Dept: FAMILY MEDICINE CLINIC | Facility: CLINIC | Age: 50
End: 2025-05-01

## 2025-05-01 VITALS
BODY MASS INDEX: 24.52 KG/M2 | TEMPERATURE: 98 F | DIASTOLIC BLOOD PRESSURE: 90 MMHG | RESPIRATION RATE: 16 BRPM | SYSTOLIC BLOOD PRESSURE: 150 MMHG | OXYGEN SATURATION: 99 % | WEIGHT: 181 LBS | HEART RATE: 86 BPM | HEIGHT: 72 IN

## 2025-05-01 DIAGNOSIS — M79.18 MUSCLE ACHE OF EXTREMITY: ICD-10-CM

## 2025-05-01 DIAGNOSIS — R03.0 ELEVATED BLOOD PRESSURE READING WITHOUT DIAGNOSIS OF HYPERTENSION: ICD-10-CM

## 2025-05-01 DIAGNOSIS — E11.8 TYPE 2 DIABETES MELLITUS WITH COMPLICATION, WITHOUT LONG-TERM CURRENT USE OF INSULIN (HCC): ICD-10-CM

## 2025-05-01 DIAGNOSIS — E78.1 HYPERTRIGLYCERIDEMIA: ICD-10-CM

## 2025-05-01 DIAGNOSIS — Z00.00 MEDICARE ANNUAL WELLNESS VISIT, SUBSEQUENT: ICD-10-CM

## 2025-05-01 DIAGNOSIS — Z12.11 SCREENING FOR COLORECTAL CANCER: ICD-10-CM

## 2025-05-01 DIAGNOSIS — M62.9 HAMSTRING TIGHTNESS OF BOTH LOWER EXTREMITIES: Primary | ICD-10-CM

## 2025-05-01 DIAGNOSIS — R26.89 BALANCE PROBLEM: ICD-10-CM

## 2025-05-01 DIAGNOSIS — Z12.12 SCREENING FOR COLORECTAL CANCER: ICD-10-CM

## 2025-05-01 DIAGNOSIS — R26.2 AMBULATORY DYSFUNCTION: ICD-10-CM

## 2025-05-01 PROCEDURE — G0537 PR RISK ASCVD TST ONCE PR 12 MO: HCPCS | Performed by: FAMILY MEDICINE

## 2025-05-01 PROCEDURE — G0439 PPPS, SUBSEQ VISIT: HCPCS | Performed by: FAMILY MEDICINE

## 2025-05-01 PROCEDURE — G2211 COMPLEX E/M VISIT ADD ON: HCPCS | Performed by: FAMILY MEDICINE

## 2025-05-01 PROCEDURE — 99214 OFFICE O/P EST MOD 30 MIN: CPT | Performed by: FAMILY MEDICINE

## 2025-05-01 RX ORDER — CYCLOBENZAPRINE HCL 5 MG
5 TABLET ORAL 3 TIMES DAILY PRN
Qty: 30 TABLET | Refills: 1 | Status: SHIPPED | OUTPATIENT
Start: 2025-05-01

## 2025-05-01 NOTE — ASSESSMENT & PLAN NOTE
Lab Results   Component Value Date    HGBA1C 7.7 (A) 04/10/2025   Uncontrolled  Recommend low carbohydrate diet  Recommend Jardiance, Januvia, and metformin

## 2025-05-01 NOTE — PROGRESS NOTES
Name: Jamie Mujica Jr.      : 1975      MRN: [id number]  Encounter Provider: Thom Vargas MD  Encounter Date: 2025   Encounter department: [facility]  :  Assessment & Plan  Hamstring tightness of both lower extremities    Orders:    Ambulatory Referral to Physical Therapy; Future    Ambulatory Referral to Occupational Therapy; Future    Muscle ache of extremity  Differentials include statin myalgia  Instructed to temporarily discontinue statin for the next couple of weeks to see if symptoms resolve  Trial of magnesium and muscle relaxant as needed      Orders:    Magnesium 400 MG CAPS; Take once daily per oral daily at night    cyclobenzaprine (FLEXERIL) 5 mg tablet; Take 1 tablet (5 mg total) by mouth 3 (three) times a day as needed for muscle spasms    Ambulatory dysfunction  Patient has a history of rheumatoid arthritis with lower extremity deformity.  He is having difficulty with balance and muscle tightness  Referred to PT /OT for evaluation  Orders:    Ambulatory Referral to Physical Therapy; Future    Ambulatory Referral to Occupational Therapy; Future    Balance problem    Orders:    Ambulatory Referral to Physical Therapy; Future    Ambulatory Referral to Occupational Therapy; Future    Screening for colorectal cancer    Orders:    Ambulatory Referral to Gastroenterology; Future    Elevated blood pressure reading without diagnosis of hypertension  Blood pressure elevated today  Instructed to obtain blood pressure device and start monitoring his home blood pressure  Recommend DASH diet  Reassess blood pressure reading next visit       Type 2 diabetes mellitus with complication, without long-term current use of insulin (HCC) [E11.8]    Lab Results   Component Value Date    HGBA1C 7.7 (A) 04/10/2025   Uncontrolled  Recommend low carbohydrate diet  Recommend Jardiance, Januvia, and metformin         Hypertriglyceridemia [E78.1]  Monitor lipid  panel  Recommend low-fat diet       Medicare annual wellness visit, subsequent            Preventive health issues were discussed with patient, and age appropriate screening tests were ordered as noted in patient's After Visit Summary. Personalized health advice and appropriate referrals for health education or preventive services given if needed, as noted in patient's After Visit Summary.    History of Present Illness     49-year-old male with a history of rheumatoid arthritis who presents today for Medicare wellness visit.  Patient reports that he has been experiencing a lot of muscle tightness in his hamstring.  He also reports generalized body ache.  He reports that he is having some difficulty with ambulating.  He has not had any recent fall.  He has deformity in his foot secondary to rheumatoid arthritis.  He is following with his rheumatologist closely.         Patient Care Team:  Thom Vargas MD as PCP - General (Family Medicine)  HAZEL Winkler as PCP - PCP-[facility] ([facility])    Review of Systems   Constitutional:  Negative for appetite change, chills, diaphoresis, fatigue and fever.   HENT:  Negative for congestion.    Eyes:  Negative for visual disturbance.   Respiratory:  Negative for cough, shortness of breath and wheezing.    Cardiovascular:  Negative for chest pain, palpitations and leg swelling.   Gastrointestinal:  Negative for abdominal pain, diarrhea, nausea and vomiting.   Endocrine: Negative for polydipsia, polyphagia and polyuria.   Genitourinary:  Negative for dysuria, frequency, hematuria and urgency.   Musculoskeletal:  Positive for arthralgias, joint swelling and myalgias. Negative for back pain.   Skin:  Negative for rash.   Neurological:  Negative for dizziness, seizures, weakness, numbness and headaches.   Psychiatric/Behavioral:  Negative for dysphoric mood.      Medical History Reviewed by provider this encounter:  Tobacco  Allergies  Meds  Problems  Med Hx   Surg Hx  Fam Hx       Annual Wellness Visit Questionnaire   Jamie is here for his Subsequent Wellness visit.     Health Risk Assessment:   Patient rates overall health as fair. Patient feels that their physical health rating is same. Patient is very satisfied with their life. Eyesight was rated as same. Hearing was rated as same. Patient feels that their emotional and mental health rating is same. Patients states they are never, rarely angry. Patient states they are sometimes unusually tired/fatigued. Pain experienced in the last 7 days has been none. Patient states that he has experienced no weight loss or gain in last 6 months.     Depression Screening:   PHQ-2 Score: 0      Fall Risk Screening:   In the past year, patient has experienced: no history of falling in past year      Home Safety:  Patient has trouble with stairs inside or outside of their home. Patient has working smoke alarms and has working carbon monoxide detector. Home safety hazards include: none.     Nutrition:   Current diet is Diabetic.     Medications:   Patient is not currently taking any over-the-counter supplements. Patient is able to manage medications.     Activities of Daily Living (ADLs)/Instrumental Activities of Daily Living (IADLs):   Walk and transfer into and out of bed and chair?: Yes  Dress and groom yourself?: Yes    Bathe or shower yourself?: Yes    Feed yourself? Yes  Do your laundry/housekeeping?: Yes  Manage your money, pay your bills and track your expenses?: Yes  Make your own meals?: Yes    Do your own shopping?: Yes    Previous Hospitalizations:   Any hospitalizations or ED visits within the last 12 months?: No      Advance Care Planning:   Living will: No    Durable POA for healthcare: No    Advanced directive: No      Cognitive Screening:   Provider or family/friend/caregiver concerned regarding cognition?: No    Preventive Screenings      Cardiovascular Screening:    General: Screening Not Indicated and History Lipid  Disorder      Diabetes Screening:     General: Screening Not Indicated and History Diabetes      Colorectal Cancer Screening:     General: Risks and Benefits Discussed      Prostate Cancer Screening:    General: Screening Not Indicated      Osteoporosis Screening:    General: Screening Not Indicated      Abdominal Aortic Aneurysm (AAA) Screening:    Risk factors include: tobacco use        Lung Cancer Screening:     General: Screening Not Indicated      Hepatitis C Screening:    General: Screening Current    Cardiovascular Risk Assessment:  Patient does not have underlying ASCVD and their cardiovascular risk was assessed today. Their cardiovascular risk factors include: hyperlipidemia, drug use and pre-diabetes or diabetes.     The 10-year ASCVD risk score (Darek PRIEST, et al., 2019) is: 6%    Values used to calculate the score:      Age: 49 years      Sex: Male      Is Non- : No      Diabetic: Yes      Tobacco smoker: No      Systolic Blood Pressure: 150 mmHg      Is BP treated: No      HDL Cholesterol: 40 mg/dL      Total Cholesterol: 164 mg/dL    Time spent assessing cardiovascular risk: 5 minutes.     Screening, Brief Intervention, and Referral to Treatment (SBIRT)     Screening  Typical number of drinks in a day: 0  Typical number of drinks in a week: 0  Interpretation: Low risk drinking behavior.    Single Item Drug Screening:  How often have you used an illegal drug (including marijuana) or a prescription medication for non-medical reasons in the past year? never    Single Item Drug Screen Score: 0  Interpretation: Negative screen for possible drug use disorder    Other Counseling Topics:   Calcium and vitamin D intake and regular weightbearing exercise.     Social Drivers of Health     Financial Resource Strain: Low Risk  (5/1/2025)    Overall Financial Resource Strain (CARDIA)     Difficulty of Paying Living Expenses: Not hard at all   Food Insecurity: No Food Insecurity (5/1/2025)     Hunger Vital Sign     Worried About Running Out of Food in the Last Year: Never true     Ran Out of Food in the Last Year: Never true   Transportation Needs: No Transportation Needs (5/1/2025)    PRAPARE - Transportation     Lack of Transportation (Medical): No     Lack of Transportation (Non-Medical): No   Housing Stability: Low Risk  (5/1/2025)    Housing Stability Vital Sign     Unable to Pay for Housing in the Last Year: No     Number of Times Moved in the Last Year: 1     Homeless in the Last Year: No   Utilities: Not At Risk (5/1/2025)    [facility] Utilities     Threatened with loss of utilities: No     No results found.    Objective   /90 (BP Location: Right arm, Patient Position: Sitting, Cuff Size: Standard)   Pulse 86   Temp 98 °F (36.7 °C) (Temporal)   Resp 16   Ht 6' (1.829 m)   Wt 82.1 kg (181 lb)   SpO2 99%   BMI 24.55 kg/m²     Physical Exam  Vitals reviewed.   Constitutional:       General: He is not in acute distress.     Appearance: Normal appearance. He is well-developed. He is not ill-appearing, toxic-appearing or diaphoretic.   HENT:      Head: Normocephalic and atraumatic.      Right Ear: Tympanic membrane, ear canal and external ear normal. There is no impacted cerumen.      Left Ear: Tympanic membrane, ear canal and external ear normal. There is no impacted cerumen.      Nose: Nose normal.      Mouth/Throat:      Mouth: Mucous membranes are moist.      Pharynx: No oropharyngeal exudate or posterior oropharyngeal erythema.   Eyes:      General: No scleral icterus.        Right eye: No discharge.         Left eye: No discharge.      Extraocular Movements: Extraocular movements intact.      Conjunctiva/sclera: Conjunctivae normal.   Cardiovascular:      Rate and Rhythm: Normal rate and regular rhythm.      Heart sounds: Normal heart sounds. No murmur heard.     No friction rub. No gallop.   Pulmonary:      Effort: Pulmonary effort is normal. No respiratory distress.      Breath sounds:  Normal breath sounds. No stridor. No wheezing or rhonchi.   Abdominal:      General: Bowel sounds are normal. There is no distension.      Palpations: Abdomen is soft. There is no mass.      Tenderness: There is no abdominal tenderness. There is no guarding.   Musculoskeletal:         General: Deformity (Left foot and ankle deformity) present. Normal range of motion.      Cervical back: Normal range of motion.      Right lower leg: No edema.      Left lower leg: No edema.      Comments: Bilateral left hand ulnar deviation   Skin:     General: Skin is warm.      Capillary Refill: Capillary refill takes less than 2 seconds.      Findings: No rash.   Neurological:      General: No focal deficit present.      Mental Status: He is alert and oriented to person, place, and time.      Motor: No weakness.      Gait: Gait abnormal.   Psychiatric:         Mood and Affect: Mood normal.         Behavior: Behavior normal.

## 2025-05-02 ENCOUNTER — TELEPHONE (OUTPATIENT)
Age: 50
End: 2025-05-02

## 2025-05-02 ENCOUNTER — PREP FOR PROCEDURE (OUTPATIENT)
Age: 50
End: 2025-05-02

## 2025-05-02 DIAGNOSIS — Z12.11 SCREENING FOR COLON CANCER: Primary | ICD-10-CM

## 2025-05-02 NOTE — TELEPHONE ENCOUNTER
05/02/25  Screened by: Kaylan Slater    Referring Provider     Pre- Screening:     There is no height or weight on file to calculate BMI.181lbs 24.55  Has patient been referred for a routine screening Colonoscopy? yes  Is the patient between 45-75 years old? yes      Previous Colonoscopy no   If yes:    Date:     Facility:     Reason:     Does the patient want to see a Gastroenterologist prior to their procedure OR are they having any GI symptoms? no    Has the patient been hospitalized or had abdominal surgery in the past 6 months? no    Does the patient use supplemental oxygen? no    Does the patient take Coumadin, Lovenox, Plavix, Elliquis, Xarelto, or other blood thinning medication? no    Has the patient had a stroke, cardiac event, or stent placed in the past year? no        If patient is between 45yrs - 49yrs, please advise patient that we will have to confirm benefits & coverage with their insurance company for a routine screening colonoscopy.

## 2025-05-02 NOTE — TELEPHONE ENCOUNTER
Scheduled date of colonoscopy (as of today):6/9/2025  Physician performing colonoscopy:Dr Cruz  Location of colonoscopy:[facility]  Bowel prep reviewed with patient:Miralax/Dulcolax Diabetic  Instructions reviewed with patient by:office to mail instructions  Clearances: N/A      Patients GI provider:     Number to return call: [phone]    Reason for call: Pt is scheduled for a colonoscopy on 6/9/2025. Please mail Miralax/Dulcolax and Diabetic instructions.    Scheduled procedure/appointment date if applicable: 6/9/2025

## 2025-05-09 ENCOUNTER — TELEPHONE (OUTPATIENT)
Age: 50
End: 2025-05-09

## 2025-05-09 NOTE — TELEPHONE ENCOUNTER
Signed. Spoke with patient and inform him, due to provider schedule change appointment can be move to another provider that day, patient says he wants a male provider and Wednesday work better for him so I move his appointment on 06/11/2025 with dr morrison

## 2025-05-12 ENCOUNTER — TELEPHONE (OUTPATIENT)
Dept: FAMILY MEDICINE CLINIC | Facility: CLINIC | Age: 50
End: 2025-05-12

## 2025-05-12 ENCOUNTER — OFFICE VISIT (OUTPATIENT)
Dept: FAMILY MEDICINE CLINIC | Facility: CLINIC | Age: 50
End: 2025-05-12

## 2025-05-12 VITALS
RESPIRATION RATE: 18 BRPM | HEART RATE: 79 BPM | DIASTOLIC BLOOD PRESSURE: 76 MMHG | WEIGHT: 179.6 LBS | TEMPERATURE: 97.9 F | BODY MASS INDEX: 24.33 KG/M2 | SYSTOLIC BLOOD PRESSURE: 130 MMHG | OXYGEN SATURATION: 97 % | HEIGHT: 72 IN

## 2025-05-12 DIAGNOSIS — M06.9 RHEUMATOID ARTHRITIS, INVOLVING UNSPECIFIED SITE, UNSPECIFIED WHETHER RHEUMATOID FACTOR PRESENT (HCC): ICD-10-CM

## 2025-05-12 DIAGNOSIS — M21.611 BUNION OF GREAT TOE OF RIGHT FOOT: ICD-10-CM

## 2025-05-12 DIAGNOSIS — E11.621 DIABETIC ULCER OF OTHER PART OF RIGHT FOOT ASSOCIATED WITH TYPE 2 DIABETES MELLITUS, LIMITED TO BREAKDOWN OF SKIN (HCC): Primary | ICD-10-CM

## 2025-05-12 DIAGNOSIS — L84 PRE-ULCERATIVE CALLUSES: ICD-10-CM

## 2025-05-12 DIAGNOSIS — L97.511 DIABETIC ULCER OF OTHER PART OF RIGHT FOOT ASSOCIATED WITH TYPE 2 DIABETES MELLITUS, LIMITED TO BREAKDOWN OF SKIN (HCC): Primary | ICD-10-CM

## 2025-05-12 PROCEDURE — G2211 COMPLEX E/M VISIT ADD ON: HCPCS | Performed by: FAMILY MEDICINE

## 2025-05-12 PROCEDURE — 99214 OFFICE O/P EST MOD 30 MIN: CPT | Performed by: FAMILY MEDICINE

## 2025-05-12 RX ORDER — CEPHALEXIN 500 MG/1
500 CAPSULE ORAL 2 TIMES DAILY
Qty: 14 CAPSULE | Refills: 0 | Status: SHIPPED | OUTPATIENT
Start: 2025-05-12 | End: 2025-05-19

## 2025-05-12 RX ORDER — CELECOXIB 200 MG/1
200 CAPSULE ORAL 2 TIMES DAILY
Qty: 60 CAPSULE | Refills: 1 | Status: SHIPPED | OUTPATIENT
Start: 2025-05-12

## 2025-05-12 NOTE — PROGRESS NOTES
Name: Jamie Mujica Jr.      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, limited to breakdown of skin (HCC)    Lab Results   Component Value Date    HGBA1C 7.7 (A) 04/10/2025     Orders:    cephalexin (KEFLEX) 500 mg capsule; Take 1 capsule (500 mg total) by mouth 2 (two) times a day for 7 days    Ambulatory Referral to Wound Care; Future    Rheumatoid arthritis, involving unspecified site, unspecified whether rheumatoid factor present (HCC)    Orders:    celecoxib (CeleBREX) 200 mg capsule; Take 1 capsule (200 mg total) by mouth 2 (two) times a day    Pre-ulcerative calluses  Recommend podiatry follow-up outpatient given that he is diabetic       Bunion of great toe of right foot  Recommend to follow-up with podiatry as outpatient  Orders:    celecoxib (CeleBREX) 200 mg capsule; Take 1 capsule (200 mg total) by mouth 2 (two) times a day           History of Present Illness   49-year-old male with a history of rheumatoid arthritis and type 2 diabetes who presents today for follow-up.  Patient has calluses in his foot.  He has bilateral dry foot.  He has been applying cream to his feet.  Patient noticed  painful, swollen deep crack in his right foot a few days ago.  He reports that the right foot wound has been draining copious amount of clear discharge.  He is experiencing foot pain with ambulation.  He was previously following up with wound care for a foot ulcer in his right heel that has healed.  He has not seen his primary podiatrist in quite some time.      Review of Systems   Constitutional:  Negative for chills, diaphoresis, fatigue and fever.   HENT:  Negative for congestion.    Eyes:  Negative for visual disturbance.   Respiratory:  Negative for cough, shortness of  breath and wheezing.    Cardiovascular:  Negative for chest pain, palpitations and leg swelling.   Gastrointestinal:  Negative for abdominal pain, nausea and vomiting.   Endocrine: Negative for polydipsia, polyphagia and polyuria.   Genitourinary:  Negative for dysuria, frequency, hematuria and urgency.   Musculoskeletal:  Positive for arthralgias.   Skin:  Positive for wound.   Neurological:  Negative for seizures, weakness and numbness.   Psychiatric/Behavioral:  Negative for dysphoric mood.        Objective   /76 (BP Location: Right arm, Patient Position: Sitting, Cuff Size: Standard)   Pulse 79   Temp 97.9 °F (36.6 °C) (Temporal)   Resp 18   Ht 6' (1.829 m)   Wt 81.5 kg (179 lb 9.6 oz)   SpO2 97%   BMI 24.36 kg/m²      Physical Exam  Vitals and nursing note reviewed.   Constitutional:       General: He is not in acute distress.     Appearance: Normal appearance. He is well-developed. He is not ill-appearing, toxic-appearing or diaphoretic.   HENT:      Head: Normocephalic and atraumatic.      Right Ear: External ear normal.      Left Ear: External ear normal.      Nose: Nose normal.   Eyes:      General: No scleral icterus.        Right eye: No discharge.         Left eye: No discharge.      Extraocular Movements: Extraocular movements intact.      Conjunctiva/sclera: Conjunctivae normal.   Cardiovascular:      Rate and Rhythm: Normal rate and regular rhythm.      Heart sounds: Normal heart sounds. No murmur heard.     No friction rub. No gallop.   Pulmonary:      Effort: Pulmonary effort is normal. No respiratory distress.      Breath sounds: Normal breath sounds.   Abdominal:      General: There is no distension.      Palpations: Abdomen is soft. There is no mass.      Tenderness: There is no abdominal tenderness.      Hernia: No hernia is present.   Musculoskeletal:         General: Normal range of motion.      Cervical back: Neck supple.      Right lower leg: No edema.      Left lower leg: No  edema.      Right foot: Deformity and bunion present.      Left foot: Deformity and bunion present.        Feet:    Feet:      Right foot:      Skin integrity: Callus, dry skin and fissure present. No blister, erythema or warmth.      Left foot:      Skin integrity: Callus and dry skin present. No erythema or fissure.      Comments: Right foot dry deep cracked skin with clear drainage  Skin:     General: Skin is warm and dry.      Capillary Refill: Capillary refill takes less than 2 seconds.   Neurological:      Mental Status: He is alert.   Psychiatric:         Mood and Affect: Mood normal.

## 2025-05-12 NOTE — LETTER
May 12, 2025     Patient: Jamie Mujica Jr.  YOB: 1975  Date of Visit: 5/12/2025      To Whom it May Concern:    Jamie Mujica is under my professional care. Jamie was seen in my office on 5/12/2025. Please excuse from work on 5/12/2025 and 5/13/2025.     If you have any questions or concerns, please don't hesitate to call.         Sincerely,          Thom Vargas MD        CC: No Recipients

## 2025-05-12 NOTE — ASSESSMENT & PLAN NOTE
Orders:    celecoxib (CeleBREX) 200 mg capsule; Take 1 capsule (200 mg total) by mouth 2 (two) times a day     How Severe Is Your Acne?: mild Is This A New Presentation, Or A Follow-Up?: Follow Up Acne

## 2025-05-16 ENCOUNTER — OFFICE VISIT (OUTPATIENT)
Dept: WOUND CARE | Facility: CLINIC | Age: 50
End: 2025-05-16
Payer: MEDICARE

## 2025-05-16 VITALS
HEART RATE: 85 BPM | HEIGHT: 72 IN | TEMPERATURE: 96.5 F | RESPIRATION RATE: 18 BRPM | BODY MASS INDEX: 24.38 KG/M2 | DIASTOLIC BLOOD PRESSURE: 76 MMHG | WEIGHT: 180 LBS | SYSTOLIC BLOOD PRESSURE: 124 MMHG

## 2025-05-16 DIAGNOSIS — L97.412 DIABETIC ULCER OF RIGHT MIDFOOT ASSOCIATED WITH TYPE 2 DIABETES MELLITUS, WITH FAT LAYER EXPOSED (HCC): Primary | ICD-10-CM

## 2025-05-16 DIAGNOSIS — E11.621 DIABETIC ULCER OF RIGHT MIDFOOT ASSOCIATED WITH TYPE 2 DIABETES MELLITUS, WITH FAT LAYER EXPOSED (HCC): Primary | ICD-10-CM

## 2025-05-16 PROCEDURE — 99213 OFFICE O/P EST LOW 20 MIN: CPT | Performed by: NURSE PRACTITIONER

## 2025-05-16 PROCEDURE — 99204 OFFICE O/P NEW MOD 45 MIN: CPT | Performed by: NURSE PRACTITIONER

## 2025-05-16 PROCEDURE — 97597 DBRDMT OPN WND 1ST 20 CM/<: CPT | Performed by: NURSE PRACTITIONER

## 2025-05-16 NOTE — PROGRESS NOTES
Wound Procedure Treatment Right Plantar    Performed by: Rosario Lara RN  Authorized by: HAZEL Cevallos  Associated wounds:   Wound 05/16/25 Diabetic Ulcer Plantar Right    Wound cleansed with:  NSS   Applied primary dressing:  Other   Applied secondary dressing:  Gauze   Dressing secured with:  Stephanie and Tape   Comments:  AMD packing strip laid over wound bed.

## 2025-05-16 NOTE — PATIENT INSTRUCTIONS
Orders Placed This Encounter   Procedures    Wound cleansing and dressings Right Plantar     Showers only with protection.  Do not leave wound open for showers.  You could use a cast cover or sponge bathe.    Wash your hands with soap and water.    Remove old dressing, discard into plastic bag and place in trash.    Cleanse the wound with Mild Soap (like Dove) & Water prior to applying a clean dressing.   Do not use tissue or cotton balls. Do not scrub the wound. Pat dry using gauze.    Apply AMD packing strip laid flat on wound.  Cover with gauze.   Secure with rolled gauze & lili  Change dressing every other day    Please try to wear sneaker as little as possible.  We have provided a surgical shoe, please where this as much as possible.     Please try to consume 3-4 servings of protein daily.   - Each serving of protein should contain about 30 mg protein.   - Good sources of protein include, lean meats (fish, chicken, etc), eggs, dairy products (yogurt, cheese),   tofu, legumes (chickpeas, lentils, peas, black beans), nuts (cashew, walnut, peanuts, etc), & quinoa.     [facility] on Wed 2/21/25.     Standing Status:   Future     Expiration Date:   5/23/2025

## 2025-05-19 NOTE — PROGRESS NOTES
Name: Jamie Mujica Jr.      : 1975      MRN: [id number]  Encounter Provider: HAZEL Cevallos  Encounter Date: 2025   Encounter department: [facility] WOUND CARE  :  Assessment & Plan  Diabetic ulcer of right midfoot associated with type 2 diabetes mellitus, with fat layer exposed (HCC)    Lab Results   Component Value Date    HGBA1C 7.7 (A) 04/10/2025       Orders:    Wound cleansing and dressings Right Plantar; Future    Wound Procedure Treatment Right Plantar    Plan:  1.  Initial visit.  Wound debrided.  Patient has a Aquino grade 1 diabetic foot ulcer not showing any signs or symptoms of infection  2.  ABIs as of 2025: 1.6 LLE and 1.39 RLE  3.  Will have single strip of AMD applied to base of wound bed covered with dry gauze and Stephanie change every other day.  4.  Patient is to wear surgical shoe on his foot when not at work.  Counseled patient on the importance of offloading pressure from his wound to enhance wound healing  5.  A1C results reviewed with the patient today.  Per patient's chart, his most recent A1c is 7.7 as of 4/10/2025.  Patient is to continue to follow recommendations to achieve tight glycemic control  6.  Counseled patient on the importance of increasing his protein intake to enhance wound healing  7.  Patient will follow-up on Wednesday with Dr. Pearson    History of Present Illness   Chief Complaint   Patient presents with    New Patient Visit     New patient visit for wound to right foot.  Pt reports he was to see PCP    Here for wound follow up.  Jamie returns to the wound center for his Aquino grade 1 diabetic foot ulcer of his right plantar foot.  He was last seen by Dr. Pearson at the wound center on 3/5/2025.  Per Dr. Pearson's note, he was supposed to be managing his wound with mupirocin and dry gauze changing his dressing daily.  Patient failed to follow-up with Dr. Pearson at the wound center.  He was managing his wound as recommended.   Patient reports he works full-time as a  and is on his feet for approximately 5 hours/day.  He denies wearing specialty diabetic shoes or inserts to help him offload pressure from his wound.  Patient has a history of type 2 diabetes.  Per patient's chart his most recent A1c was 7.7 as of 4/10/2025.  He denies any pain, fevers, or chills.      Objective   /76   Pulse 85   Temp (!) 96.5 °F (35.8 °C) (Tympanic)   Resp 18   Ht 6' (1.829 m)   Wt 81.6 kg (180 lb)   BMI 24.41 kg/m²     Physical Exam  Vitals and nursing note reviewed.   Constitutional:       General: He is not in acute distress.     Appearance: Normal appearance. He is normal weight.   HENT:      Head: Normocephalic and atraumatic.     Eyes:      General:         Right eye: No discharge.         Left eye: No discharge.     Pulmonary:      Effort: Pulmonary effort is normal. No respiratory distress.     Musculoskeletal:      Cervical back: Normal range of motion and neck supple. No rigidity.      Right lower leg: No edema.      Left lower leg: No edema.      Right foot: Deformity and bunion present.   Feet:      Right foot:      Skin integrity: Ulcer, blister and fissure present. No erythema or warmth.     Skin:     General: Skin is warm and dry.      Findings: No erythema.     Neurological:      General: No focal deficit present.      Mental Status: He is alert and oriented to person, place, and time. Mental status is at baseline.     Psychiatric:         Mood and Affect: Mood normal.         Behavior: Behavior normal.         Thought Content: Thought content normal.         Judgment: Judgment normal.       Wound 05/16/25 Diabetic Ulcer Plantar Right (Active)   Wound Image   05/16/25 1543   Enter Aquino score: Aquino Grade 1: Partial or full-thickness ulcer (superficial) 05/16/25 1539   Wound Description KEVIN;Pink 05/16/25 1539   Non-staged Wound Description Full thickness 05/16/25 1539   Wound Length (cm) 2 cm 05/16/25 1539   Wound Width  "(cm) 0.2 cm 05/16/25 1539   Wound Depth (cm) 0.3 cm 05/16/25 1539   Wound Surface Area (cm^2) 0.31 cm^2 05/16/25 1539   Wound Volume (cm^3) 0.063 cm^3 05/16/25 1539   Calculated Wound Volume (cm^3) 0.12 cm^3 05/16/25 1539   Number of tunnels 1 05/16/25 1539   Tunneling 1 1.2 cm 05/16/25 1539   Tunneling 1 in depth located at @ 6 o'clock 05/16/25 1539   Drainage Amount Moderate 05/16/25 1539   Drainage Description Serous;Yellow 05/16/25 1539   Sarah-wound Assessment Intact;Callus 05/16/25 1539   Dressing Status Removed 05/16/25 1539       Debridement   Wound 05/16/25 Diabetic Ulcer Plantar Right     Date/Time: 5/16/2025 3:15 PM    Universal Protocol:  procedure performed by consultantConsent: Written consent obtained  Consent given by: patient  Time out: Immediately prior to procedure a \"time out\" was called to verify the correct patient, procedure, equipment, support staff and site/side marked as required.  Timeout called at: 5/19/2025 11:02 AM.  Patient identity confirmed: verbally with patient    Debridement Details  Performed by: NP  Debridement type: selective  Pain control: lidocaine 4%    Post-debridement measurements  Length (cm): 2  Width (cm): 0.2  Depth (cm): 0.3  Percent debrided: 100%  Surface Area (cm^2): 0.31  Area Debrided (cm^2): 0.31  Volume (cm^3): 0.06    Devitalized tissue debrided: biofilm, fibrin and slough  Instrument(s) utilized: curette  Bleeding: small  Hemostasis obtained with: pressure  Procedural pain (0-10): 0  Post-procedural pain: 0   Response to treatment: procedure was tolerated well               "

## 2025-05-21 ENCOUNTER — OFFICE VISIT (OUTPATIENT)
Dept: WOUND CARE | Facility: CLINIC | Age: 50
End: 2025-05-21
Payer: MEDICARE

## 2025-05-21 VITALS
DIASTOLIC BLOOD PRESSURE: 78 MMHG | TEMPERATURE: 96.4 F | SYSTOLIC BLOOD PRESSURE: 128 MMHG | RESPIRATION RATE: 14 BRPM | HEART RATE: 96 BPM

## 2025-05-21 DIAGNOSIS — R23.4 CHANGES IN SKIN TEXTURE: Primary | ICD-10-CM

## 2025-05-21 PROBLEM — E11.621 DIABETIC ULCER OF RIGHT MIDFOOT ASSOCIATED WITH TYPE 2 DIABETES MELLITUS, WITH FAT LAYER EXPOSED (HCC): Status: ACTIVE | Noted: 2025-05-21

## 2025-05-21 PROBLEM — L97.412 DIABETIC ULCER OF RIGHT MIDFOOT ASSOCIATED WITH TYPE 2 DIABETES MELLITUS, WITH FAT LAYER EXPOSED (HCC): Status: ACTIVE | Noted: 2025-05-21

## 2025-05-21 PROCEDURE — 99212 OFFICE O/P EST SF 10 MIN: CPT | Performed by: PODIATRIST

## 2025-05-21 NOTE — PROGRESS NOTES
Name: Jamie Mujica Jr.      : 1975      MRN: [id number]  Encounter Provider: Dmitriy Pearson DPM  Encounter Date: 2025   Encounter department: [facility] WOUND C[facility]  :  Assessment & Plan  Changes in skin texture    Lab Results   Component Value Date    HGBA1C 7.7 (A) 04/10/2025       Orders:    Wound cleansing and dressings Right Plantar; Future        - pt eval and managed     - Number and complexity of problems addressed: 1 stable chronic illness as shown     - Amount/complexity of data reviewed and analyzed:      Category 1: prior patient notes were analyzed today before evaluating and managing patient. All PMH were discussed with pt today.   - Risk of complications: moderate risk of morbidity from additional testing or treatment involved with this patient, which includes but not limited to:      - discussed anatomy, condition, treatment plan and options. They were instructed on proper foot care. This is total time spent today involving both face-to-face time and non face-to-face time. This time spent includes reviewing their past medical history, performing a medically appropriate examination and evaluation of the patient, counseling and educating the patient,, documenting all findings in EMR, and independently interpreting results and communicating results with patientand discussing their condition and treatment options, risks, and potential complications. I have discussed the findings of this examination with the patient. The discussion included a complete verbal explanation of the examination results, diagnosis and planned treatment(s). A schedule for future care needs was explained. The patient has verbalized the understanding of these instructions at this time. If any questions should arise after returning home I have encouraged the patient to feel free to call the office.        - fissures have healed at this time  - pt to look for signs of recurrence  - I d/w pt that  he should wear wide shoes, as he has bunions. Shoes are too narrow      Follow up as needed    History of Present Illness   Chief Complaint   Patient presents with    Follow Up Wound Care Visit     Right foot wound   Here for wound follow up.    49 year old male presents today with wound to the plantar right foot. Pt using Silvadene to the area. Not packing. Denies n/v/f/c.         Objective   /78   Pulse 96   Temp (!) 96.4 °F (35.8 °C)   Resp 14     Physical Exam  Constitutional:       Appearance: Normal appearance. He is normal weight.     Cardiovascular:      Pulses:           Dorsalis pedis pulses are 1+ on the right side and 1+ on the left side.        Posterior tibial pulses are 1+ on the right side and 1+ on the left side.   Feet:      Right foot:      Protective Sensation: 8 sites tested.  2 sites sensed.      Left foot:      Protective Sensation: 8 sites tested.  4 sites sensed.      Comments: Plantar foot wound/fissure healed, pt skin healed in divot form, however, no opening of skin    Shoe analysis: pt shoes are too tight for his feet, causing skin to squeeze together.       Wound 05/16/25 Diabetic Ulcer Plantar Right (Active)   Wound Image   05/21/25 1451   Enter Aquino score: Aquino Grade 1: Partial or full-thickness ulcer (superficial) 05/21/25 1450   Wound Description Epithelialization 05/21/25 1450   Non-staged Wound Description Full thickness 05/16/25 1539   Wound Length (cm) 0 cm 05/21/25 1450   Wound Width (cm) 0 cm 05/21/25 1450   Wound Depth (cm) 0 cm 05/21/25 1450   Wound Surface Area (cm^2) 0 cm^2 05/21/25 1450   Wound Volume (cm^3) 0 cm^3 05/21/25 1450   Calculated Wound Volume (cm^3) 0 cm^3 05/21/25 1450   Change in Wound Size % 100 05/21/25 1450   Number of tunnels 1 05/16/25 1539   Tunneling 1 0 cm 05/21/25 1450   Tunneling 1 in depth located at resolved 05/21/25 1450   Drainage Amount None 05/21/25 1450   Drainage Description Serous;Yellow 05/16/25 1539   Sarah-wound Assessment  Intact;Callus 05/21/25 1450   Wound packed? No 05/21/25 1450   Dressing Status Removed 05/16/25 1539       Procedures

## 2025-05-21 NOTE — ASSESSMENT & PLAN NOTE
Lab Results   Component Value Date    HGBA1C 7.7 (A) 04/10/2025       Orders:    Wound cleansing and dressings Right Plantar; Future

## 2025-05-21 NOTE — PATIENT INSTRUCTIONS
Orders Placed This Encounter   Procedures    Wound cleansing and dressings Right Plantar     Right foot wound is healed. No dressing needed.       Shower, yes. Pat dry.  Moisturize area daily after showering.     Get larger sneakers and shoe inserts with padding as per Dr. Pearson. (Pantry sneaker [employer] in [place])        Patient is discharged from the wound center.     Standing Status:   Future     Expiration Date:   5/28/2025

## 2025-06-02 ENCOUNTER — TELEPHONE (OUTPATIENT)
Dept: FAMILY MEDICINE CLINIC | Facility: CLINIC | Age: 50
End: 2025-06-02

## 2025-06-02 NOTE — TELEPHONE ENCOUNTER
Patient called nurse line regarding records. Patient advised he can come into the office and sign consent for print out of his labs.

## 2025-06-09 ENCOUNTER — APPOINTMENT (OUTPATIENT)
Dept: LAB | Facility: HOSPITAL | Age: 50
End: 2025-06-09
Payer: MEDICARE

## 2025-06-09 DIAGNOSIS — Z00.00 ROUTINE CHECK-UP: ICD-10-CM

## 2025-06-09 DIAGNOSIS — E11.8 TYPE 2 DIABETES MELLITUS WITH COMPLICATION, WITHOUT LONG-TERM CURRENT USE OF INSULIN (HCC): ICD-10-CM

## 2025-06-09 LAB
ALBUMIN SERPL BCG-MCNC: 4.5 G/DL (ref 3.5–5)
ALP SERPL-CCNC: 74 U/L (ref 34–104)
ALT SERPL W P-5'-P-CCNC: 17 U/L (ref 7–52)
ANION GAP SERPL CALCULATED.3IONS-SCNC: 8 MMOL/L (ref 4–13)
AST SERPL W P-5'-P-CCNC: 14 U/L (ref 13–39)
BASOPHILS # BLD AUTO: 0.02 THOUSANDS/ÂΜL (ref 0–0.1)
BASOPHILS NFR BLD AUTO: 0 % (ref 0–1)
BILIRUB SERPL-MCNC: 0.39 MG/DL (ref 0.2–1)
BUN SERPL-MCNC: 25 MG/DL (ref 5–25)
CALCIUM SERPL-MCNC: 9.7 MG/DL (ref 8.4–10.2)
CHLORIDE SERPL-SCNC: 104 MMOL/L (ref 96–108)
CO2 SERPL-SCNC: 25 MMOL/L (ref 21–32)
CREAT SERPL-MCNC: 0.71 MG/DL (ref 0.6–1.3)
EOSINOPHIL # BLD AUTO: 0.15 THOUSAND/ÂΜL (ref 0–0.61)
EOSINOPHIL NFR BLD AUTO: 2 % (ref 0–6)
ERYTHROCYTE [DISTWIDTH] IN BLOOD BY AUTOMATED COUNT: 11.3 % (ref 11.6–15.1)
GFR SERPL CREATININE-BSD FRML MDRD: 111 ML/MIN/1.73SQ M
GLUCOSE SERPL-MCNC: 168 MG/DL (ref 65–140)
HCT VFR BLD AUTO: 41.3 % (ref 36.5–49.3)
HGB BLD-MCNC: 13.7 G/DL (ref 12–17)
IMM GRANULOCYTES # BLD AUTO: 0.03 THOUSAND/UL (ref 0–0.2)
IMM GRANULOCYTES NFR BLD AUTO: 1 % (ref 0–2)
LYMPHOCYTES # BLD AUTO: 1.37 THOUSANDS/ÂΜL (ref 0.6–4.47)
LYMPHOCYTES NFR BLD AUTO: 22 % (ref 14–44)
MCH RBC QN AUTO: 33.5 PG (ref 26.8–34.3)
MCHC RBC AUTO-ENTMCNC: 33.2 G/DL (ref 31.4–37.4)
MCV RBC AUTO: 101 FL (ref 82–98)
MONOCYTES # BLD AUTO: 0.72 THOUSAND/ÂΜL (ref 0.17–1.22)
MONOCYTES NFR BLD AUTO: 11 % (ref 4–12)
NEUTROPHILS # BLD AUTO: 4.05 THOUSANDS/ÂΜL (ref 1.85–7.62)
NEUTS SEG NFR BLD AUTO: 64 % (ref 43–75)
NRBC BLD AUTO-RTO: 0 /100 WBCS
PLATELET # BLD AUTO: 264 THOUSANDS/UL (ref 149–390)
PMV BLD AUTO: 10.6 FL (ref 8.9–12.7)
POTASSIUM SERPL-SCNC: 4.1 MMOL/L (ref 3.5–5.3)
PROT SERPL-MCNC: 7.4 G/DL (ref 6.4–8.4)
RBC # BLD AUTO: 4.09 MILLION/UL (ref 3.88–5.62)
SODIUM SERPL-SCNC: 137 MMOL/L (ref 135–147)
VIT B12 SERPL-MCNC: 390 PG/ML (ref 180–914)
WBC # BLD AUTO: 6.34 THOUSAND/UL (ref 4.31–10.16)

## 2025-06-09 PROCEDURE — 85025 COMPLETE CBC W/AUTO DIFF WBC: CPT

## 2025-06-09 PROCEDURE — 36415 COLL VENOUS BLD VENIPUNCTURE: CPT

## 2025-06-09 PROCEDURE — 82607 VITAMIN B-12: CPT

## 2025-06-09 PROCEDURE — 80053 COMPREHEN METABOLIC PANEL: CPT

## 2025-06-10 ENCOUNTER — EVALUATION (OUTPATIENT)
Dept: PHYSICAL THERAPY | Facility: CLINIC | Age: 50
End: 2025-06-10
Attending: FAMILY MEDICINE
Payer: MEDICARE

## 2025-06-10 DIAGNOSIS — R26.2 AMBULATORY DYSFUNCTION: ICD-10-CM

## 2025-06-10 DIAGNOSIS — M62.9 HAMSTRING TIGHTNESS OF BOTH LOWER EXTREMITIES: Primary | ICD-10-CM

## 2025-06-10 DIAGNOSIS — R26.89 BALANCE PROBLEM: ICD-10-CM

## 2025-06-10 PROCEDURE — 97140 MANUAL THERAPY 1/> REGIONS: CPT

## 2025-06-10 PROCEDURE — 97110 THERAPEUTIC EXERCISES: CPT

## 2025-06-10 PROCEDURE — 97162 PT EVAL MOD COMPLEX 30 MIN: CPT

## 2025-06-10 NOTE — PROGRESS NOTES
PT Evaluation     Today's date: 6/10/2025  Patient name: Jamie Mujica Jr.  : 1975  MRN: [id number]  Referring provider: Thom Vargas  Dx:   Encounter Diagnosis     ICD-10-CM    1. Hamstring tightness of both lower extremities  M62.9       2. Ambulatory dysfunction  R26.2       3. Balance problem  R26.89           Start Time: 1630  Stop Time: 1725  Total time in clinic (min): 55 minutes    Assessment  Impairments: abnormal gait, abnormal muscle firing, abnormal or restricted ROM, abnormal movement, activity intolerance, impaired balance, impaired physical strength, lacks appropriate home exercise program, pain with function, weight-bearing intolerance, poor body mechanics, participation limitations, activity limitations and endurance  Symptom irritability: high    Assessment details: Pt is a 49 y.o. year old male presenting to physical therapy for Hamstring tightness of both lower extremities  (primary encounter diagnosis),Ambulatory dysfunction, and Balance problem.  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  Pt will benefit from skilled physical therapy to address functional limitations noted in evaluation and meet patient goals.   Barriers to therapy: Rheumatoid arthritis affecting L ankle significantly.   Understanding of Dx/Px/POC: fair     Prognosis: fair    Goals  ST. Pt will be independent with HEP.  2. Pt will improve L ankle PF AROM by 10+ degrees.   3. Pt will improve tightness of b/l hamstrings.  LT. Pt will improve pain at rest  to 0/10.  2. Pt will improve L ankle strength grossly by 2 grades or more to improve functional ability.  3. Pt will improve gait ability to WNL with LRAD.     Plan  Patient would benefit from: PT eval and skilled physical therapy  Planned modality interventions: biofeedback, manual electrical stimulation, microcurrent electrical stimulation, TENS, electrical stimulation/Russian stimulation, thermotherapy: hydrocollator packs, cryotherapy and unattended electrical stimulation    Planned therapy interventions: abdominal trunk stabilization, joint mobilization, manual therapy, massage, ADL retraining, neuromuscular re-education, body mechanics training, patient education, postural training, strengthening, stretching, therapeutic activities, therapeutic exercise, flexibility, functional ROM exercises, home exercise program and balance/weight bearing training    Frequency: 2x week  Duration in weeks: 6  Treatment plan discussed with: patient  Plan details: Discussed with pt that we will trial conservative treatment for balance, gait, and LLE foot and ankle deficits, however due to the degree of deformity of the L foot and ankle as well as progressive nature of , may require a consultation with a foot specialist or orthopedist to discuss possible surgical intervention, pt agreed with plan.         Subjective Evaluation    History of Present Illness  Mechanism of injury: Pt presents to the clinic with history of tightness of b/l hamstrings, balance deficits, and ambulatory dysfunction that started about 6 years ago where he stated that he was diagnosed with rheumatoid arthritis that severely affects the joints of his feet and ankles and has significant inflammation that causes him to limp and lose his balance more often. Pt stated that he is taking medication and is following up closely with his rheumatologist for treatment of it. Pt stated that when he is standing still sometimes he feels like he is going to lose his  balance and fall over. Pt stated that he is usually able to catch himself when he loses his balance and has not had a fall over the past year. Pt stated that he does have some tightness of his b/l hamstrings, although he does not feel any pain in them today. Pt stated that usually he has more discomfort and has more flare ups at nighttime.   Patient Goals  Patient goals for therapy: increased motion, improved balance, decreased pain, increased strength, decreased edema, return to sport/leisure activities and independence with ADLs/IADLs    Pain  Current pain ratin  At best pain ratin  At worst pain ratin  Quality: discomfort, sharp, radiating, throbbing, tight, squeezing and cramping          Objective     Observations   Left Ankle/Foot   Positive for deformity, edema, effusion and trophic changes.     Right Ankle/Foot   Positive for trophic changes.     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  Pt also has deep skin abnormalities on the R plantar aspect of the foot between 1st and 2nd metatarsals and 2nd and 3rd metatarsals respectively.     Palpation     Additional Palpation Details  Increased tightness of b/l hamstrings with increased deficits noted on the LLE.     Active Range of Motion   Left Ankle/Foot   Dorsiflexion (ke): 6 degrees   Plantar flexion: 9 degrees   Inversion: 4 degrees   Eversion: 4 degrees     Right Ankle/Foot   Dorsiflexion (ke): 8 degrees   Plantar flexion: 48 degrees   Inversion: 24 degrees   Eversion: 16 degrees     Strength/Myotome Testing     Left Hip   Normal muscle strength    Right Hip   Normal muscle strength    Left Knee   Normal strength    Right Knee   Normal strength    Left Ankle/Foot   Dorsiflexion: 3-  Plantar flexion: 2+  Inversion: 3-  Eversion: 3-  Great toe extension: 3    Right Ankle/Foot   Dorsiflexion: 5  Plantar  "flexion: 5  Inversion: 5  Eversion: 5  Great toe extension: 5    General Comments:      Ankle/Foot Comments   Significant swelling of L foot and ankle.     Balance    NBOS: 1 minute  R Romber seconds  L Romber seconds  Tandem stance: 5 seconds  L SLS: unable  R SLS: 10 seconds.    Gait:  Significant antalgic gait due to deformity of L foot and ankle.  Significant external rotation of foot and hip during gait of LLE.             Precautions: Rheumatoid Arthritis.    Date 6/10            Visit # 1            FOTO IE             Re-eval IE              Manuals 6/10            L ankle PROM              HS str                                       Neuro Re-Ed 6/10            Ankle pumps 10x            Ankle circles 10x ea             Ankle alphabet             Tandem stance             SLS                                       Ther Ex 6/10            bike trial            Ankle 4 way              Heel raises             HS str 2x30\" sup strap                                                                Ther Activity 6/10            squats             Step ups             Gait Training 6/10            Gait with SPC                          Modalities 6/10            ice                               "

## 2025-06-16 ENCOUNTER — OFFICE VISIT (OUTPATIENT)
Dept: PHYSICAL THERAPY | Facility: CLINIC | Age: 50
End: 2025-06-16
Attending: FAMILY MEDICINE
Payer: MEDICARE

## 2025-06-16 DIAGNOSIS — R26.2 AMBULATORY DYSFUNCTION: ICD-10-CM

## 2025-06-16 DIAGNOSIS — M62.9 HAMSTRING TIGHTNESS OF BOTH LOWER EXTREMITIES: Primary | ICD-10-CM

## 2025-06-16 DIAGNOSIS — R26.89 BALANCE PROBLEM: ICD-10-CM

## 2025-06-16 PROCEDURE — 97110 THERAPEUTIC EXERCISES: CPT

## 2025-06-16 PROCEDURE — 97140 MANUAL THERAPY 1/> REGIONS: CPT

## 2025-06-16 PROCEDURE — 97112 NEUROMUSCULAR REEDUCATION: CPT

## 2025-06-16 NOTE — PROGRESS NOTES
"Daily Note     Today's date: 2025  Patient name: Jamie Mujica Jr.  : 1975  MRN: [id number]  Referring provider: Thom Vargas*  Dx:   Encounter Diagnosis     ICD-10-CM    1. Hamstring tightness of both lower extremities  M62.9       2. Ambulatory dysfunction  R26.2       3. Balance problem  R26.89           Start Time: 1740  Stop Time: 8  Total time in clinic (min): 38 minutes    Subjective: Pt presented to the clinic 10 minutes late, was able to accommodate pt accordingly. Pt stated that he has been icing at home to help with his L ankle swelling and it has been helping him. Pt stated he feels like he has a little more flexibility compared to before.       Objective: See treatment diary below      Assessment: Did not trial warm up on bike today due to pt being late, trial nv. Pt was challenged appropriately with trial of ankle 4 way with light TB resistance today and continues to have difficulty going through full ankle ROM with performance. Pt had difficulty with standing heel raises without use of UE support due to pain in the L ankle, was able to perform fairly with UE support. Pt would benefit from continued skilled PT to improve LLE strength, ankle mobility, flexibility, weight bearing ability, and functional ability.     Plan: Continue per plan of care.  Progress treatment as tolerated.       Precautions: Rheumatoid Arthritis.    Date 6/10 6/16           Visit # 1 2           FOTO IE             Re-eval IE              Manuals 6/10 6/16           L ankle PROM   DK           HS str                                       Neuro Re-Ed 6/10 6/16           Ankle pumps 10x 30x           Ankle circles 10x ea  30x c,cc L           Ankle alphabet  1x           Tandem stance             SLS             rockerboard                          Ther Ex 6/10 6/16           bike trial np           Ankle 4 way   20x ea OTB L           Heel raises  20x w UE sup           HS str 2x30\" sup strap          "   Marching              Leg press                                       Ther Activity 6/10 6/16           squats  2x10 UE sup           Step ups             Gait Training 6/10 6/16           Gait with SPC                          Modalities 6/10 6/16           ice

## 2025-06-24 ENCOUNTER — OFFICE VISIT (OUTPATIENT)
Dept: PHYSICAL THERAPY | Facility: CLINIC | Age: 50
End: 2025-06-24
Attending: FAMILY MEDICINE
Payer: MEDICARE

## 2025-06-24 DIAGNOSIS — M62.9 HAMSTRING TIGHTNESS OF BOTH LOWER EXTREMITIES: Primary | ICD-10-CM

## 2025-06-24 DIAGNOSIS — R26.2 AMBULATORY DYSFUNCTION: ICD-10-CM

## 2025-06-24 DIAGNOSIS — R26.89 BALANCE PROBLEM: ICD-10-CM

## 2025-06-24 PROCEDURE — 97110 THERAPEUTIC EXERCISES: CPT

## 2025-06-24 PROCEDURE — 97112 NEUROMUSCULAR REEDUCATION: CPT

## 2025-06-24 PROCEDURE — 97140 MANUAL THERAPY 1/> REGIONS: CPT

## 2025-06-24 NOTE — PROGRESS NOTES
Daily Note     Today's date: 2025  Patient name: Jamie Mujica Jr.  : 1975  MRN: [id number]  Referring provider: Thom Vargas*  Dx:   Encounter Diagnosis     ICD-10-CM    1. Hamstring tightness of both lower extremities  M62.9       2. Ambulatory dysfunction  R26.2       3. Balance problem  R26.89           Start Time: 1630  Stop Time: 1715  Total time in clinic (min): 45 minutes    Subjective: Pt stated that he has been icing everyday since last visit and has been feeling good relief in his ankle as well as noticed improvement in swelling. However, pt continues to have significant difficulty and discomfort due to his L foot and ankle.       Objective: See treatment diary below      Assessment: Pt tolerated warm up on bike well at beginning of session without increase in discomfort during or after activity. Pt was challenged with trial of leg press activity and had some discomfort in his L ankle when applying pressure to push himself back as well as stretch when returning to starting position in his achilles. Pt had some difficulty with standing heel raises and trial of rockerboard activities today but was able to complete full sets of repetitions, continue to progress as able. Pt tolerated manual L ankle PROM and stretching with reported decrease in stiffness post manual treatment. Pt would benefit from continued skilled PT to improve L ankle strength, balance, gait, endurance, weight bearing tolerance, and functional ability.     Plan: Continue per plan of care.  Progress treatment as tolerated.       Precautions: Rheumatoid Arthritis.    Date 6/10 6/16 6/24          Visit # 1 2 3          FOTO IE             Re-eval IE              Manuals 6/10 6/16 6/24          L ankle PROM   DK DK          HS str                                       Neuro Re-Ed 6/10 6/16 6/24          Ankle pumps 10x 30x 30x          Ankle circles 10x ea  30x c,cc L 30c c,cc L          Ankle alphabet  1x 1x         "  Tandem stance             SLS             rockerboard   20x f/b, s/s          Rockerboard balance   10x~5\"                       Ther Ex 6/10 6/16 6/24          bike trial np 5' pre          Ankle 4 way   20x ea OTB L 20x ea OTB L          Heel raises  20x w UE sup 20x w UE sup          HS str 2x30\" sup strap            Marching              Leg press   2x15 145# BLE                                    Ther Activity 6/10 6/16 6/24          squats  2x10 UE sup           Step ups             Gait Training 6/10 6/16           Gait with SPC                          Modalities 6/10 6/16           ice                                 "

## 2025-06-25 DIAGNOSIS — M06.9 RHEUMATOID ARTHRITIS, INVOLVING UNSPECIFIED SITE, UNSPECIFIED WHETHER RHEUMATOID FACTOR PRESENT (HCC): ICD-10-CM

## 2025-06-25 DIAGNOSIS — M21.611 BUNION OF GREAT TOE OF RIGHT FOOT: ICD-10-CM

## 2025-06-25 RX ORDER — CELECOXIB 200 MG/1
200 CAPSULE ORAL 2 TIMES DAILY
Qty: 180 CAPSULE | Refills: 1 | Status: SHIPPED | OUTPATIENT
Start: 2025-06-25

## 2025-06-26 ENCOUNTER — APPOINTMENT (OUTPATIENT)
Dept: PHYSICAL THERAPY | Facility: CLINIC | Age: 50
End: 2025-06-26
Attending: FAMILY MEDICINE
Payer: MEDICARE

## 2025-07-10 ENCOUNTER — OFFICE VISIT (OUTPATIENT)
Dept: PHYSICAL THERAPY | Facility: CLINIC | Age: 50
End: 2025-07-10
Attending: FAMILY MEDICINE
Payer: MEDICARE

## 2025-07-10 ENCOUNTER — OFFICE VISIT (OUTPATIENT)
Dept: FAMILY MEDICINE CLINIC | Facility: CLINIC | Age: 50
End: 2025-07-10

## 2025-07-10 VITALS
SYSTOLIC BLOOD PRESSURE: 126 MMHG | RESPIRATION RATE: 18 BRPM | BODY MASS INDEX: 23.3 KG/M2 | HEART RATE: 92 BPM | WEIGHT: 172 LBS | TEMPERATURE: 97.6 F | DIASTOLIC BLOOD PRESSURE: 72 MMHG | OXYGEN SATURATION: 96 % | HEIGHT: 72 IN

## 2025-07-10 DIAGNOSIS — S80.812A INFECTED ABRASION OF LEFT LOWER EXTREMITY, INITIAL ENCOUNTER: ICD-10-CM

## 2025-07-10 DIAGNOSIS — M79.672 PAIN IN BOTH FEET: ICD-10-CM

## 2025-07-10 DIAGNOSIS — R26.89 BALANCE PROBLEM: ICD-10-CM

## 2025-07-10 DIAGNOSIS — L08.9 INFECTED ABRASION OF LEFT LOWER EXTREMITY, INITIAL ENCOUNTER: ICD-10-CM

## 2025-07-10 DIAGNOSIS — E11.8 TYPE 2 DIABETES MELLITUS WITH COMPLICATION, WITHOUT LONG-TERM CURRENT USE OF INSULIN (HCC): Primary | ICD-10-CM

## 2025-07-10 DIAGNOSIS — E78.1 HYPERTRIGLYCERIDEMIA: ICD-10-CM

## 2025-07-10 DIAGNOSIS — M62.9 HAMSTRING TIGHTNESS OF BOTH LOWER EXTREMITIES: Primary | ICD-10-CM

## 2025-07-10 DIAGNOSIS — R26.2 AMBULATORY DYSFUNCTION: ICD-10-CM

## 2025-07-10 DIAGNOSIS — M06.9 RHEUMATOID ARTHRITIS, INVOLVING UNSPECIFIED SITE, UNSPECIFIED WHETHER RHEUMATOID FACTOR PRESENT (HCC): ICD-10-CM

## 2025-07-10 DIAGNOSIS — M79.671 PAIN IN BOTH FEET: ICD-10-CM

## 2025-07-10 PROBLEM — L65.0 TELOGEN EFFLUVIUM: Status: RESOLVED | Noted: 2023-06-28 | Resolved: 2025-07-10

## 2025-07-10 PROBLEM — S91.301A OPEN WOUND OF RIGHT HEEL: Status: RESOLVED | Noted: 2025-01-16 | Resolved: 2025-07-10

## 2025-07-10 LAB
CREAT UR-MCNC: 141.8 MG/DL
MICROALBUMIN UR-MCNC: 14.2 MG/L
MICROALBUMIN/CREAT 24H UR: 10 MG/G CREATININE (ref 0–30)
SL AMB POCT HEMOGLOBIN AIC: 6.9 (ref ?–6.5)

## 2025-07-10 PROCEDURE — G2211 COMPLEX E/M VISIT ADD ON: HCPCS | Performed by: FAMILY MEDICINE

## 2025-07-10 PROCEDURE — 97110 THERAPEUTIC EXERCISES: CPT

## 2025-07-10 PROCEDURE — 97112 NEUROMUSCULAR REEDUCATION: CPT

## 2025-07-10 PROCEDURE — 83036 HEMOGLOBIN GLYCOSYLATED A1C: CPT | Performed by: FAMILY MEDICINE

## 2025-07-10 PROCEDURE — 82043 UR ALBUMIN QUANTITATIVE: CPT | Performed by: FAMILY MEDICINE

## 2025-07-10 PROCEDURE — 99214 OFFICE O/P EST MOD 30 MIN: CPT | Performed by: FAMILY MEDICINE

## 2025-07-10 PROCEDURE — 82570 ASSAY OF URINE CREATININE: CPT | Performed by: FAMILY MEDICINE

## 2025-07-10 PROCEDURE — 97140 MANUAL THERAPY 1/> REGIONS: CPT

## 2025-07-10 RX ORDER — MUPIROCIN 2 %
OINTMENT (GRAM) TOPICAL 3 TIMES DAILY
Qty: 30 G | Refills: 0 | Status: SHIPPED | OUTPATIENT
Start: 2025-07-10 | End: 2025-07-17

## 2025-07-10 NOTE — ASSESSMENT & PLAN NOTE
Lab Results   Component Value Date    HGBA1C 6.9 (A) 07/10/2025   Well controlled  Recommend low carbohydrate diet  Recommend Jardiance, Januvia, and metformin      Orders:    POCT hemoglobin A1c    Albumin / creatinine urine ratio

## 2025-07-10 NOTE — PROGRESS NOTES
Daily Note     Today's date: 7/10/2025  Patient name: Jamie Mujica Jr.  : 1975  MRN: [id number]  Referring provider: Thom Vargas*  Dx:   Encounter Diagnosis     ICD-10-CM    1. Hamstring tightness of both lower extremities  M62.9       2. Ambulatory dysfunction  R26.2       3. Balance problem  R26.89           Start Time: 1130  Stop Time: 1215  Total time in clinic (min): 45 minutes    Subjective: Pt stated that he ordered a compression sock for his L foot and ankle and has been walking more smoothly and with less pain, however he continues to have a lot of swelling and foot angled in still.       Objective: See treatment diary below      Assessment: Pt tolerated warm up on bike well at beginning of session without increase in discomfort during or after activity. Pt tolerated manual L ankle PROM and stretching with reported decrease in stiffness post manual treatment. Pt demonstrated improvement in L ankle mobility and ROM with progression of ankle 4 way activity with decreased fatigue post performance. Pt also showed good improvement in weight bearing ability on LLE during rockerboard balance activity and would benefit from continued increase in duration held. Pt did not tolerate trial of heel raises without UE support well at end of visit, modified to using hands for assist. Pt would benefit from continued skilled PT to improve L ankle strength, mobility, ROM, balance, weight bearing ability, gait, and functional ability.    Plan: Continue per plan of care.  Progress treatment as tolerated.       Precautions: Rheumatoid Arthritis.    Date 6/10 6/16 6/24 7/10         Visit # 1 2 3 4         FOTO IE             Re-eval IE              Manuals 6/10 6/16 6/24 7/10         L ankle PROM   DK DK DK         HS str                                       Neuro Re-Ed 6/10 6/16 6/24 7/10         Ankle pumps 10x 30x 30x          Ankle circles 10x ea  30x c,cc L 30c c,cc L 30x c,cc L         Ankle alphabet   "1x 1x 1x L         Tandem stance             SLS             rockerboard   20x f/b, s/s 20x f/b, s/s         Rockerboard balance   10x~5\" 10x10\" s/s                      Ther Ex 6/10 6/16 6/24 7/10         bike trial np 5' pre 5'          Ankle 4 way   20x ea OTB L 20x ea OTB L 20x ea PTB L         Heel raises  20x w UE sup 20x w UE sup 20x UE sup         HS str 2x30\" sup strap            Marching              Leg press   2x15 145# BLE 2x15 155# BLE                                   Ther Activity 6/10 6/16 6/24 7/10         squats  2x10 UE sup           Step ups             Gait Training 6/10 6/16           Gait with SPC                          Modalities 6/10 6/16           ice                                   "

## 2025-07-10 NOTE — PROGRESS NOTES
Name: Jamie Mujica Jr.      : 1975      MRN: [id number]  Encounter Provider: Thom Vargas MD  Encounter Date: 7/10/2025   Encounter department: [facility]  :  Assessment & Plan  Type 2 diabetes mellitus with complication, without long-term current use of insulin (HCC)    Lab Results   Component Value Date    HGBA1C 6.9 (A) 07/10/2025   Well controlled  Recommend low carbohydrate diet  Recommend Jardiance, Januvia, and metformin      Orders:    POCT hemoglobin A1c    Albumin / creatinine urine ratio     Pain in both feet    Orders:    Diclofenac Sodium (VOLTAREN) 1 %; Apply 2 g topically 3 (three) times a day as needed (foot pain)    Infected abrasion of left lower extremity, initial encounter  Minor superficial abrasion wound  Topical Bactroban  Dressing changed in the clinic  Orders:    mupirocin (BACTROBAN) 2 % ointment; Apply topically 3 (three) times a day for 7 days    Rheumatoid arthritis, involving unspecified site, unspecified whether rheumatoid factor present ([facility])  Stable  Continue Xeljanz, meloxicam, and leflunomide  Continue follow-up with rheumatology outpatient          Hypertriglyceridemia  Continue statin  Monitor lipid panel              History of Present Illness   49-year-old male with a history of rheumatoid arthritis and type 2 diabetes who presents today for follow-up.  He is doing well overall.  He is taking his medications regularly.  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  He has been changing the wound dressing every day.  The wound is getting better.      Review of Systems   Constitutional:  Negative for chills, diaphoresis, fatigue and fever.   HENT:  Negative for congestion.    Eyes:  Negative for visual disturbance.   Respiratory:  Negative for cough, shortness of breath and wheezing.    Cardiovascular:  Negative for chest pain, palpitations and leg swelling.   Gastrointestinal:   Negative for abdominal pain, nausea and vomiting.   Endocrine: Negative for polydipsia, polyphagia and polyuria.   Genitourinary:  Negative for dysuria, frequency, hematuria and urgency.   Musculoskeletal:  Positive for arthralgias.   Skin:  Positive for wound.   Neurological:  Negative for seizures, weakness, numbness and headaches.   Psychiatric/Behavioral:  Negative for dysphoric mood.        Objective   /72 (BP Location: Right arm, Patient Position: Sitting, Cuff Size: Standard)   Pulse 92   Temp 97.6 °F (36.4 °C) (Temporal)   Resp 18   Ht 6' (1.829 m)   Wt 78 kg (172 lb)   SpO2 96%   BMI 23.33 kg/m²      Physical Exam  Vitals reviewed.   Constitutional:       General: He is not in acute distress.     Appearance: Normal appearance. He is well-developed. He is not ill-appearing, toxic-appearing or diaphoretic.   HENT:      Head: Normocephalic and atraumatic.      Right Ear: External ear normal. There is no impacted cerumen.      Left Ear: External ear normal. There is no impacted cerumen.      Nose: No congestion.      Mouth/Throat:      Mouth: Mucous membranes are moist.     Eyes:      General: No scleral icterus.        Right eye: No discharge.         Left eye: No discharge.      Extraocular Movements: Extraocular movements intact.      Conjunctiva/sclera: Conjunctivae normal.      Pupils: Pupils are equal, round, and reactive to light.       Cardiovascular:      Rate and Rhythm: Normal rate and regular rhythm.      Heart sounds: Normal heart sounds. No murmur heard.     No friction rub. No gallop.   Pulmonary:      Effort: Pulmonary effort is normal. No respiratory distress.      Breath sounds: Normal breath sounds. No stridor. No wheezing or rhonchi.   Abdominal:      General: Bowel sounds are normal. There is no distension.      Palpations: Abdomen is soft.      Tenderness: There is no abdominal tenderness. There is no guarding.     Musculoskeletal:         General: Deformity present. Normal  range of motion.      Cervical back: Normal range of motion.     Skin:     General: Skin is warm.      Capillary Refill: Capillary refill takes less than 2 seconds.           Comments: Abrasion wound.  Mildly erythematous and tender to palpation.  No surrounding erythema or drainages     Neurological:      General: No focal deficit present.      Mental Status: He is alert and oriented to person, place, and time.      Gait: Gait abnormal.     Psychiatric:         Mood and Affect: Mood normal.         Behavior: Behavior normal.

## 2025-07-10 NOTE — ASSESSMENT & PLAN NOTE
Stable  Continue Xeljanz, meloxicam, and leflunomide  Continue follow-up with rheumatology outpatient

## 2025-07-14 ENCOUNTER — APPOINTMENT (OUTPATIENT)
Dept: PHYSICAL THERAPY | Facility: CLINIC | Age: 50
End: 2025-07-14
Attending: FAMILY MEDICINE
Payer: MEDICARE

## 2025-07-16 ENCOUNTER — OFFICE VISIT (OUTPATIENT)
Dept: PHYSICAL THERAPY | Facility: CLINIC | Age: 50
End: 2025-07-16
Attending: FAMILY MEDICINE
Payer: MEDICARE

## 2025-07-16 DIAGNOSIS — R26.2 AMBULATORY DYSFUNCTION: ICD-10-CM

## 2025-07-16 DIAGNOSIS — M62.9 HAMSTRING TIGHTNESS OF BOTH LOWER EXTREMITIES: Primary | ICD-10-CM

## 2025-07-16 DIAGNOSIS — R26.89 BALANCE PROBLEM: ICD-10-CM

## 2025-07-16 PROCEDURE — 97112 NEUROMUSCULAR REEDUCATION: CPT

## 2025-07-16 PROCEDURE — 97140 MANUAL THERAPY 1/> REGIONS: CPT

## 2025-07-16 PROCEDURE — 97110 THERAPEUTIC EXERCISES: CPT

## 2025-07-16 NOTE — PROGRESS NOTES
Daily Note     Today's date: 2025  Patient name: Jamie Mujica Jr.  : 1975  MRN: [id number]  Referring provider: Thom Vargas*  Dx:   Encounter Diagnosis     ICD-10-CM    1. Hamstring tightness of both lower extremities  M62.9       2. Ambulatory dysfunction  R26.2       3. Balance problem  R26.89           Start Time: 0900  Stop Time: 0955  Total time in clinic (min): 55 minutes    Subjective: Pt stated that his ankle sleeve continues to give him relief and help with his swelling, but his ankle continues to be turned inwards and has pain and difficulty with walking still.       Objective: See treatment diary below      Assessment: Discussed with pt that due to the chronicity of his dysfunction and that his ankle positioning is more or less fixed now due to rheumatoid complications, it is acting as a structural deformity and it is hard to conclude how much the pt will improve with conservative therapy, however pt has shown slight improvements in flexibility and ROM since IE. Pt tolerated manual R ankle PROM and stretching with reported decrease in stiffness post manual treatment. Pt continues to have some difficulty with performance of heel raises even with UE support as well as rockerboard mobility activities. Pt would benefit from continued skilled PT to improve R ankle strength, mobility, flexibility, ROM, gait, balance, and functional ability.     Plan: Continue per plan of care.  Progress treatment as tolerated.       Precautions: Rheumatoid Arthritis.    Date 6/10 6/16 6/24 7/10 7/16        Visit # 1 2 3 4 2        FOTO IE     done        Re-eval IE              Manuals 6/10 6/16 6/24 7/10 7/16        L ankle PROM   DK DK DK DK        HS str                                       Neuro Re-Ed 6/10 6/16 6/24 7/10 7/16        Ankle pumps 10x 30x 30x          Ankle circles 10x ea  30x c,cc L 30c c,cc L 30x c,cc L 30x c,cc L        Ankle alphabet  1x 1x 1x L 1x L        Tandem stance           "   SLS             rockerboard   20x f/b, s/s 20x f/b, s/s 20x f/b, s/s        Rockerboard balance   10x~5\" 10x10\" s/s                      Ther Ex 6/10 6/16 6/24 7/10 7/16        bike trial np 5' pre 5'  6'        Ankle 4 way   20x ea OTB L 20x ea OTB L 20x ea PTB L 20x ea PTB L        Heel raises  20x w UE sup 20x w UE sup 20x UE sup 20x UE sup        HS str 2x30\" sup strap            Marching              Leg press   2x15 145# BLE 2x15 155# BLE 2x15 155# BLE        Long sit calf str     5x30\" R                     Ther Activity 6/10 6/16 6/24 7/10         squats  2x10 UE sup           Step ups             Gait Training 6/10 6/16           Gait with SPC                          Modalities 6/10 6/16           ice                                     "

## 2025-07-21 ENCOUNTER — OFFICE VISIT (OUTPATIENT)
Dept: PHYSICAL THERAPY | Facility: CLINIC | Age: 50
End: 2025-07-21
Attending: FAMILY MEDICINE
Payer: MEDICARE

## 2025-07-21 DIAGNOSIS — M62.9 HAMSTRING TIGHTNESS OF BOTH LOWER EXTREMITIES: Primary | ICD-10-CM

## 2025-07-21 DIAGNOSIS — R26.89 BALANCE PROBLEM: ICD-10-CM

## 2025-07-21 DIAGNOSIS — R26.2 AMBULATORY DYSFUNCTION: ICD-10-CM

## 2025-07-21 PROCEDURE — 97112 NEUROMUSCULAR REEDUCATION: CPT

## 2025-07-21 PROCEDURE — 97110 THERAPEUTIC EXERCISES: CPT

## 2025-07-21 PROCEDURE — 97140 MANUAL THERAPY 1/> REGIONS: CPT

## 2025-07-21 NOTE — PROGRESS NOTES
"Daily Note     Today's date: 2025  Patient name: Jamie Mujica Jr.  : 1975  MRN: [id number]  Referring provider: Thom Vargas  Dx:   Encounter Diagnosis     ICD-10-CM    1. Hamstring tightness of both lower extremities  M62.9       2. Ambulatory dysfunction  R26.2       3. Balance problem  R26.89                      Subjective: Pt presents to PT reporting he notes improvement with moving the L ankle.  Pt arrived 10 mins late; able to accommodate.      Objective: See treatment diary below      Assessment: Pt demonstrates fair tolerance to TE noting difficulty remains with L ankle ER ROM and strength. Pt continues to work toward PT goals.   Patient demonstrated fatigue post treatment, exhibited good technique with therapeutic exercises, and would benefit from continued PT to increase flexibility, strength and function.      Plan: Continue per plan of care.      Precautions: Rheumatoid Arthritis.    Date 6/10 6/16 6/24 7/10 7/16 7/21       Visit # 1 2 3 4 2 6       FOTO IE     done        Re-eval IE              Manuals 6/10 6/16 6/24 7/10 7/16 7/21       L ankle PROM   DK DK DK DK PK       HS str                                       Neuro Re-Ed 6/10 6/16 6/24 7/10 7/16 7/21       Ankle pumps 10x 30x 30x          Ankle circles 10x ea  30x c,cc L 30c c,cc L 30x c,cc L 30x c,cc L 30x c,cc L       Ankle alphabet  1x 1x 1x L 1x L 1x L       Tandem stance             SLS             rockerboard   20x f/b, s/s 20x f/b, s/s 20x f/b, s/s        Rockerboard balance   10x~5\" 10x10\" s/s                      Ther Ex 6/10 6/16 6/24 7/10 7/16 7/21       bike trial np 5' pre 5'  6' 6'       Ankle 4 way   20x ea OTB L 20x ea OTB L 20x ea PTB L 20x ea PTB L        Heel raises  20x w UE sup 20x w UE sup 20x UE sup 20x UE sup 20x UE sup       HS str 2x30\" sup strap            Marching              Leg press   2x15 145# BLE 2x15 155# BLE 2x15 155# BLE 2x15 155# BLE       Long sit calf str     5x30\" R 5x30\" R     "                Ther Activity 6/10 6/16 6/24 7/10  7/21       squats  2x10 UE sup           Step ups             Gait Training 6/10 6/16           Gait with SPC                          Modalities 6/10 6/16           ice

## 2025-07-29 ENCOUNTER — APPOINTMENT (OUTPATIENT)
Dept: PHYSICAL THERAPY | Facility: CLINIC | Age: 50
End: 2025-07-29
Attending: FAMILY MEDICINE
Payer: MEDICARE

## 2025-07-31 ENCOUNTER — OFFICE VISIT (OUTPATIENT)
Dept: PHYSICAL THERAPY | Facility: CLINIC | Age: 50
End: 2025-07-31
Attending: FAMILY MEDICINE
Payer: MEDICARE

## 2025-07-31 DIAGNOSIS — R26.89 BALANCE PROBLEM: ICD-10-CM

## 2025-07-31 DIAGNOSIS — M62.9 HAMSTRING TIGHTNESS OF BOTH LOWER EXTREMITIES: Primary | ICD-10-CM

## 2025-07-31 DIAGNOSIS — R26.2 AMBULATORY DYSFUNCTION: ICD-10-CM

## 2025-07-31 PROCEDURE — 97110 THERAPEUTIC EXERCISES: CPT

## 2025-07-31 PROCEDURE — 97112 NEUROMUSCULAR REEDUCATION: CPT

## 2025-07-31 PROCEDURE — 97140 MANUAL THERAPY 1/> REGIONS: CPT

## 2025-08-13 ENCOUNTER — OFFICE VISIT (OUTPATIENT)
Dept: FAMILY MEDICINE CLINIC | Facility: CLINIC | Age: 50
End: 2025-08-13

## 2025-08-13 PROBLEM — E11.621 DIABETIC ULCER OF RIGHT MIDFOOT ASSOCIATED WITH TYPE 2 DIABETES MELLITUS, WITH FAT LAYER EXPOSED (HCC): Status: RESOLVED | Noted: 2025-05-21 | Resolved: 2025-08-13

## 2025-08-13 PROBLEM — L97.412 DIABETIC ULCER OF RIGHT MIDFOOT ASSOCIATED WITH TYPE 2 DIABETES MELLITUS, WITH FAT LAYER EXPOSED (HCC): Status: RESOLVED | Noted: 2025-05-21 | Resolved: 2025-08-13

## 2025-08-18 ENCOUNTER — OFFICE VISIT (OUTPATIENT)
Dept: FAMILY MEDICINE CLINIC | Facility: CLINIC | Age: 50
End: 2025-08-18

## 2025-08-18 ENCOUNTER — EVALUATION (OUTPATIENT)
Dept: PHYSICAL THERAPY | Facility: CLINIC | Age: 50
End: 2025-08-18
Attending: FAMILY MEDICINE
Payer: MEDICARE

## 2025-08-18 VITALS
BODY MASS INDEX: 23.83 KG/M2 | OXYGEN SATURATION: 98 % | TEMPERATURE: 98.2 F | RESPIRATION RATE: 18 BRPM | DIASTOLIC BLOOD PRESSURE: 86 MMHG | WEIGHT: 175.9 LBS | HEART RATE: 84 BPM | HEIGHT: 72 IN | SYSTOLIC BLOOD PRESSURE: 124 MMHG

## 2025-08-18 DIAGNOSIS — R26.2 AMBULATORY DYSFUNCTION: ICD-10-CM

## 2025-08-18 DIAGNOSIS — Z23 NEED FOR COVID-19 VACCINE: Primary | ICD-10-CM

## 2025-08-18 DIAGNOSIS — E55.9 VITAMIN D DEFICIENCY: ICD-10-CM

## 2025-08-18 DIAGNOSIS — R26.89 BALANCE PROBLEM: ICD-10-CM

## 2025-08-18 DIAGNOSIS — Z20.822 EXPOSURE TO COVID-19 VIRUS: ICD-10-CM

## 2025-08-18 DIAGNOSIS — R53.83 OTHER FATIGUE: ICD-10-CM

## 2025-08-18 DIAGNOSIS — M62.9 HAMSTRING TIGHTNESS OF BOTH LOWER EXTREMITIES: Primary | ICD-10-CM

## 2025-08-18 PROCEDURE — 90480 ADMN SARSCOV2 VAC 1/ONLY CMP: CPT | Performed by: FAMILY MEDICINE

## 2025-08-18 PROCEDURE — 91320 SARSCV2 VAC 30MCG TRS-SUC IM: CPT | Performed by: FAMILY MEDICINE

## 2025-08-18 PROCEDURE — 99213 OFFICE O/P EST LOW 20 MIN: CPT | Performed by: FAMILY MEDICINE

## 2025-08-18 PROCEDURE — 97164 PT RE-EVAL EST PLAN CARE: CPT

## 2025-08-18 RX ORDER — CHOLECALCIFEROL (VITAMIN D3) 25 MCG
2000 TABLET ORAL DAILY
Qty: 60 TABLET | Refills: 1 | Status: SHIPPED | OUTPATIENT
Start: 2025-08-18